# Patient Record
Sex: MALE | Race: BLACK OR AFRICAN AMERICAN | Employment: PART TIME | URBAN - METROPOLITAN AREA
[De-identification: names, ages, dates, MRNs, and addresses within clinical notes are randomized per-mention and may not be internally consistent; named-entity substitution may affect disease eponyms.]

---

## 2017-01-06 ENCOUNTER — HOSPITAL ENCOUNTER (OUTPATIENT)
Dept: LAB | Age: 47
Discharge: HOME | End: 2017-01-06
Attending: INTERNAL MEDICINE
Payer: MEDICARE

## 2017-01-06 ENCOUNTER — HOSPITAL ENCOUNTER (OUTPATIENT)
Dept: ULTRASOUND IMAGING | Age: 47
Discharge: HOME | End: 2017-01-06
Attending: INTERNAL MEDICINE
Payer: MEDICARE

## 2017-01-06 DIAGNOSIS — R18.8 ASCITES: ICD-10-CM

## 2017-01-06 PROCEDURE — 49083 ABD PARACENTESIS W/IMAGING: CPT

## 2017-01-06 NOTE — PROCEDURES
INTERVENTIONAL RADIOLOGY POST PROCEDURE NOTE              Paracentesis procedure note    January 6, 2017       9:45 AM     Preoperative Diagnosis:   Ascites. Postoperative Diagnosis:  Same. Attending Physician: Dr Laila Nolasco    : Rosina Hunter PA-C    Assistant:  None. Type of Anesthesia:  1% Lidocaine, local    Procedure:  Paracentesis    Description:   Using ultrasound guidance the largest pocket of peritoneal fluid was localized and marked at the Left lower quadrant. The patient was prepped and draped in the usual fashion. 1% Lidocaine was infiltrated locally. A 5 Czech over the needle catheter was advanced into the peritoneal cavity and clear colored fluid was aspirated. Once fluid was easily aspirated the needle was removed leaving the catheter in place. The catheter was connected to vacuum containers and 6 liters of ascitic fluid was removed. Findings:   6  Liters of ascites removed. Estimated blood Loss:  Minimal     Transfusions: None. Implants: None. Specimen Removed:   No    Drains: None.     Complications: None    Condition: Stable    Disposition: Return [address] [address], PA-C

## 2017-01-18 DIAGNOSIS — I10 HYPERTENSION, UNCONTROLLED: Chronic | ICD-10-CM

## 2017-01-18 NOTE — TELEPHONE ENCOUNTER
Requested Prescriptions     Pending Prescriptions Disp Refills    lisinopril (PRINIVIL, ZESTRIL) 40 mg tablet 30 Tab 1     Sig: Take 1 Tab by mouth daily.

## 2017-01-19 RX ORDER — ISOSORBIDE MONONITRATE 60 MG/1
TABLET, EXTENDED RELEASE ORAL
Qty: 90 TAB | Refills: 1 | Status: SHIPPED | OUTPATIENT
Start: 2017-01-19 | End: 2017-02-10

## 2017-01-19 RX ORDER — LISINOPRIL 40 MG/1
40 TABLET ORAL DAILY
Qty: 30 TAB | Refills: 1 | Status: SHIPPED | OUTPATIENT
Start: 2017-01-19 | End: 2017-01-19 | Stop reason: SDUPTHER

## 2017-01-19 RX ORDER — LISINOPRIL 40 MG/1
TABLET ORAL
Qty: 90 TAB | Refills: 1 | Status: SHIPPED | OUTPATIENT
Start: 2017-01-19 | End: 2017-04-19 | Stop reason: SDUPTHER

## 2017-01-27 ENCOUNTER — HOSPITAL ENCOUNTER (OUTPATIENT)
Dept: ULTRASOUND IMAGING | Age: 47
Discharge: HOME | End: 2017-01-27
Attending: INTERNAL MEDICINE
Payer: MEDICARE

## 2017-01-27 ENCOUNTER — HOSPITAL ENCOUNTER (OUTPATIENT)
Dept: LAB | Age: 47
Discharge: HOME | End: 2017-01-27
Attending: INTERNAL MEDICINE
Payer: MEDICARE

## 2017-01-27 DIAGNOSIS — R18.8 ASCITIC FLUID: ICD-10-CM

## 2017-01-27 DIAGNOSIS — R18.8 ASCITES: ICD-10-CM

## 2017-01-27 LAB
ANION GAP BLD CALC-SCNC: 18 MMOL/L (ref 3–18)
APTT PPP: 31.9 SEC (ref 23–36.4)
BUN SERPL-MCNC: 66 MG/DL (ref 7–18)
BUN/CREAT SERPL: 9 (ref 12–20)
CALCIUM SERPL-MCNC: 8.6 MG/DL (ref 8.5–10.1)
CHLORIDE SERPL-SCNC: 98 MMOL/L (ref 100–108)
CO2 SERPL-SCNC: 27 MMOL/L (ref 21–32)
CREAT SERPL-MCNC: 7.71 MG/DL (ref 0.6–1.3)
ERYTHROCYTE [DISTWIDTH] IN BLOOD BY AUTOMATED COUNT: 17.9 % (ref 11.6–14.5)
GLUCOSE SERPL-MCNC: 107 MG/DL (ref 74–99)
HCT VFR BLD AUTO: 43.3 % (ref 36–48)
HGB BLD-MCNC: 13.7 G/DL (ref 13–16)
INR PPP: 1.5 (ref 0.8–1.2)
MCH RBC QN AUTO: 28.8 PG (ref 24–34)
MCHC RBC AUTO-ENTMCNC: 31.6 G/DL (ref 31–37)
MCV RBC AUTO: 91.2 FL (ref 74–97)
PLATELET # BLD AUTO: 274 K/UL (ref 135–420)
PMV BLD AUTO: 11.3 FL (ref 9.2–11.8)
POTASSIUM SERPL-SCNC: 5.5 MMOL/L (ref 3.5–5.5)
PROTHROMBIN TIME: 17.6 SEC (ref 11.5–15.2)
RBC # BLD AUTO: 4.75 M/UL (ref 4.7–5.5)
SODIUM SERPL-SCNC: 143 MMOL/L (ref 136–145)
WBC # BLD AUTO: 11.1 K/UL (ref 4.6–13.2)

## 2017-01-27 PROCEDURE — 36415 COLL VENOUS BLD VENIPUNCTURE: CPT | Performed by: INTERNAL MEDICINE

## 2017-01-27 PROCEDURE — 85730 THROMBOPLASTIN TIME PARTIAL: CPT | Performed by: INTERNAL MEDICINE

## 2017-01-27 PROCEDURE — 49083 ABD PARACENTESIS W/IMAGING: CPT

## 2017-01-27 PROCEDURE — 80048 BASIC METABOLIC PNL TOTAL CA: CPT | Performed by: INTERNAL MEDICINE

## 2017-01-27 PROCEDURE — 85027 COMPLETE CBC AUTOMATED: CPT | Performed by: INTERNAL MEDICINE

## 2017-01-27 PROCEDURE — 85610 PROTHROMBIN TIME: CPT | Performed by: INTERNAL MEDICINE

## 2017-01-27 NOTE — PROCEDURES
INTERVENTIONAL RADIOLOGY POST PROCEDURE NOTE     January 27, 2017       2:06 PM     History: Cari Hanks is a 51 y.o. male with history of ascites. Procedure: Paracentesis    Pre-operative diagnosis: Ascites    Post-operative diagnosis: Same    Procedure details: Informed consent obtained. Patient prepared and draped in usual sterile fashion. 1% Lidocaine infiltrated locally. Sheathed needle advanced into peritoneal space under imaging guidance. Sheath advanced over stationary needle. Needle removed. Paracentesis performed. Sheath removed. Sterile dressing applied.     Access: Right lower abdominal quadrant (percutaneous)    Findings: Successful paracentesis; 6000 mL fluid removed    : Sherrie Knowles PA-C    Assistant(s): None    Attending: Dr Martir Mckeon    Medication(s): Local anesthesia    Contrast: None    Specimen: NO    Estimated blood loss: Minimal    Blood administered: None    Drain(s): None    Implant(s): None    Complication(s): None          Condition: Stable    Disposition: Continue previous    Sherrie Knowles PA-C

## 2017-02-01 RX ORDER — ISOSORBIDE MONONITRATE 60 MG/1
60 TABLET, EXTENDED RELEASE ORAL
Qty: 30 TAB | Refills: 1 | Status: CANCELLED | OUTPATIENT
Start: 2017-02-01

## 2017-02-01 NOTE — TELEPHONE ENCOUNTER
Call made to Pt using two identifiers. Pt made aware that the refill request on the Imdur  that he was asking for was done on 1/19/17 for a 90 day supply and 1 refill and that he can call his pharmacy for  and he verbalized understanding.

## 2017-02-01 NOTE — TELEPHONE ENCOUNTER
Requested Prescriptions     Pending Prescriptions Disp Refills    isosorbide mononitrate ER (IMDUR) 60 mg CR tablet 30 Tab 1     Sig: Take 1 Tab by mouth every morning.

## 2017-02-08 ENCOUNTER — HOSPITAL ENCOUNTER (OUTPATIENT)
Dept: ULTRASOUND IMAGING | Age: 47
Discharge: HOME | End: 2017-02-08
Attending: INTERNAL MEDICINE
Payer: MEDICARE

## 2017-02-08 DIAGNOSIS — R18.8 ASCITES: ICD-10-CM

## 2017-02-08 PROCEDURE — 49083 ABD PARACENTESIS W/IMAGING: CPT

## 2017-02-10 ENCOUNTER — APPOINTMENT (OUTPATIENT)
Dept: GENERAL RADIOLOGY | Age: 47
End: 2017-02-10
Attending: INTERNAL MEDICINE
Payer: MEDICARE

## 2017-02-10 ENCOUNTER — APPOINTMENT (OUTPATIENT)
Dept: GENERAL RADIOLOGY | Age: 47
End: 2017-02-10
Attending: EMERGENCY MEDICINE
Payer: MEDICARE

## 2017-02-10 ENCOUNTER — HOSPITAL ENCOUNTER (OUTPATIENT)
Age: 47
Setting detail: OBSERVATION
Discharge: HOME | End: 2017-02-11
Attending: EMERGENCY MEDICINE | Admitting: INTERNAL MEDICINE
Payer: MEDICARE

## 2017-02-10 DIAGNOSIS — E87.5 ACUTE HYPERKALEMIA: Primary | ICD-10-CM

## 2017-02-10 DIAGNOSIS — Z99.2 ESRD NEEDING DIALYSIS (HCC): ICD-10-CM

## 2017-02-10 DIAGNOSIS — N18.6 ESRD NEEDING DIALYSIS (HCC): ICD-10-CM

## 2017-02-10 LAB
ANION GAP BLD CALC-SCNC: 19 MMOL/L (ref 10–20)
ANION GAP BLD CALC-SCNC: 19 MMOL/L (ref 3–18)
ANION GAP BLD CALC-SCNC: 21 MMOL/L (ref 10–20)
ANION GAP BLD CALC-SCNC: 21 MMOL/L (ref 3–18)
ATRIAL RATE: 69 BPM
BASOPHILS # BLD AUTO: 0 K/UL (ref 0–0.06)
BASOPHILS # BLD: 0 % (ref 0–3)
BUN BLD-MCNC: 115 MG/DL (ref 7–18)
BUN BLD-MCNC: 116 MG/DL (ref 7–18)
BUN SERPL-MCNC: 117 MG/DL (ref 7–18)
BUN SERPL-MCNC: 66 MG/DL (ref 7–18)
BUN/CREAT SERPL: 11 (ref 12–20)
BUN/CREAT SERPL: 9 (ref 12–20)
CA-I BLD-MCNC: 0.87 MMOL/L (ref 1.12–1.32)
CA-I BLD-MCNC: 1.15 MMOL/L (ref 1.12–1.32)
CALCIUM SERPL-MCNC: 7.8 MG/DL (ref 8.5–10.1)
CALCIUM SERPL-MCNC: 9.1 MG/DL (ref 8.5–10.1)
CALCULATED P AXIS, ECG09: 30 DEGREES
CALCULATED R AXIS, ECG10: -28 DEGREES
CALCULATED T AXIS, ECG11: 35 DEGREES
CHLORIDE BLD-SCNC: 107 MMOL/L (ref 100–108)
CHLORIDE BLD-SCNC: 108 MMOL/L (ref 100–108)
CHLORIDE SERPL-SCNC: 101 MMOL/L (ref 100–108)
CHLORIDE SERPL-SCNC: 102 MMOL/L (ref 100–108)
CO2 BLD-SCNC: 17 MMOL/L (ref 19–24)
CO2 BLD-SCNC: 21 MMOL/L (ref 19–24)
CO2 SERPL-SCNC: 17 MMOL/L (ref 21–32)
CO2 SERPL-SCNC: 23 MMOL/L (ref 21–32)
CREAT SERPL-MCNC: 11 MG/DL (ref 0.6–1.3)
CREAT SERPL-MCNC: 7.02 MG/DL (ref 0.6–1.3)
CREAT UR-MCNC: 10.1 MG/DL (ref 0.6–1.3)
CREAT UR-MCNC: 10.6 MG/DL (ref 0.6–1.3)
DIAGNOSIS, 93000: NORMAL
DIFFERENTIAL METHOD BLD: ABNORMAL
EOSINOPHIL # BLD: 0 K/UL (ref 0–0.4)
EOSINOPHIL NFR BLD: 0 % (ref 0–5)
ERYTHROCYTE [DISTWIDTH] IN BLOOD BY AUTOMATED COUNT: 17.9 % (ref 11.6–14.5)
GLUCOSE BLD STRIP.AUTO-MCNC: 108 MG/DL (ref 74–106)
GLUCOSE BLD STRIP.AUTO-MCNC: 56 MG/DL (ref 70–110)
GLUCOSE BLD STRIP.AUTO-MCNC: 90 MG/DL (ref 70–110)
GLUCOSE BLD STRIP.AUTO-MCNC: 94 MG/DL (ref 74–106)
GLUCOSE SERPL-MCNC: 107 MG/DL (ref 74–99)
GLUCOSE SERPL-MCNC: 228 MG/DL (ref 74–99)
HCT VFR BLD AUTO: 44.4 % (ref 36–48)
HCT VFR BLD CALC: 39 % (ref 36–49)
HCT VFR BLD CALC: 46 % (ref 36–49)
HGB BLD-MCNC: 13.3 G/DL (ref 12–16)
HGB BLD-MCNC: 14.4 G/DL (ref 13–16)
HGB BLD-MCNC: 15.6 G/DL (ref 12–16)
LYMPHOCYTES # BLD AUTO: 10 % (ref 20–51)
LYMPHOCYTES # BLD: 1.1 K/UL (ref 0.8–3.5)
MAGNESIUM SERPL-MCNC: 2.4 MG/DL (ref 1.8–2.4)
MCH RBC QN AUTO: 28.3 PG (ref 24–34)
MCHC RBC AUTO-ENTMCNC: 32.4 G/DL (ref 31–37)
MCV RBC AUTO: 87.4 FL (ref 74–97)
MONOCYTES # BLD: 0.9 K/UL (ref 0–1)
MONOCYTES NFR BLD AUTO: 8 % (ref 2–9)
NEUTS SEG # BLD: 8.7 K/UL (ref 1.8–8)
NEUTS SEG NFR BLD AUTO: 82 % (ref 42–75)
P-R INTERVAL, ECG05: 192 MS
PHOSPHATE SERPL-MCNC: 15.9 MG/DL (ref 2.5–4.9)
PLATELET # BLD AUTO: 370 K/UL (ref 135–420)
PLATELET COMMENTS,PCOM: ABNORMAL
PMV BLD AUTO: 12.7 FL (ref 9.2–11.8)
POTASSIUM BLD-SCNC: 7.2 MMOL/L (ref 3.5–5.5)
POTASSIUM BLD-SCNC: 7.8 MMOL/L (ref 3.5–5.5)
POTASSIUM SERPL-SCNC: 4.5 MMOL/L (ref 3.5–5.5)
POTASSIUM SERPL-SCNC: 7.6 MMOL/L (ref 3.5–5.5)
Q-T INTERVAL, ECG07: 454 MS
QRS DURATION, ECG06: 106 MS
QTC CALCULATION (BEZET), ECG08: 486 MS
RBC # BLD AUTO: 5.08 M/UL (ref 4.7–5.5)
RBC MORPH BLD: ABNORMAL
SODIUM BLD-SCNC: 137 MMOL/L (ref 136–145)
SODIUM BLD-SCNC: 138 MMOL/L (ref 136–145)
SODIUM SERPL-SCNC: 140 MMOL/L (ref 136–145)
SODIUM SERPL-SCNC: 143 MMOL/L (ref 136–145)
URATE SERPL-MCNC: 8.9 MG/DL (ref 2.6–7.2)
VENTRICULAR RATE, ECG03: 69 BPM
WBC # BLD AUTO: 10.7 K/UL (ref 4.6–13.2)

## 2017-02-10 PROCEDURE — 80047 BASIC METABLC PNL IONIZED CA: CPT

## 2017-02-10 PROCEDURE — 71010 XR CHEST PORT: CPT

## 2017-02-10 PROCEDURE — 74011250637 HC RX REV CODE- 250/637: Performed by: INTERNAL MEDICINE

## 2017-02-10 PROCEDURE — 80048 BASIC METABOLIC PNL TOTAL CA: CPT | Performed by: EMERGENCY MEDICINE

## 2017-02-10 PROCEDURE — 99285 EMERGENCY DEPT VISIT HI MDM: CPT

## 2017-02-10 PROCEDURE — 96374 THER/PROPH/DIAG INJ IV PUSH: CPT

## 2017-02-10 PROCEDURE — 82962 GLUCOSE BLOOD TEST: CPT

## 2017-02-10 PROCEDURE — 74011000250 HC RX REV CODE- 250

## 2017-02-10 PROCEDURE — 80048 BASIC METABOLIC PNL TOTAL CA: CPT | Performed by: INTERNAL MEDICINE

## 2017-02-10 PROCEDURE — 77030013140 HC MSK NEB VYRM -A

## 2017-02-10 PROCEDURE — A9270 NON-COVERED ITEM OR SERVICE: HCPCS | Performed by: EMERGENCY MEDICINE

## 2017-02-10 PROCEDURE — 74011636637 HC RX REV CODE- 636/637: Performed by: EMERGENCY MEDICINE

## 2017-02-10 PROCEDURE — 84550 ASSAY OF BLOOD/URIC ACID: CPT | Performed by: INTERNAL MEDICINE

## 2017-02-10 PROCEDURE — 74011250636 HC RX REV CODE- 250/636

## 2017-02-10 PROCEDURE — 74011250637 HC RX REV CODE- 250/637: Performed by: EMERGENCY MEDICINE

## 2017-02-10 PROCEDURE — 83735 ASSAY OF MAGNESIUM: CPT | Performed by: INTERNAL MEDICINE

## 2017-02-10 PROCEDURE — 84100 ASSAY OF PHOSPHORUS: CPT | Performed by: INTERNAL MEDICINE

## 2017-02-10 PROCEDURE — 73620 X-RAY EXAM OF FOOT: CPT

## 2017-02-10 PROCEDURE — 96375 TX/PRO/DX INJ NEW DRUG ADDON: CPT

## 2017-02-10 PROCEDURE — 93005 ELECTROCARDIOGRAM TRACING: CPT

## 2017-02-10 PROCEDURE — 74011250636 HC RX REV CODE- 250/636: Performed by: EMERGENCY MEDICINE

## 2017-02-10 PROCEDURE — 96376 TX/PRO/DX INJ SAME DRUG ADON: CPT

## 2017-02-10 PROCEDURE — 90935 HEMODIALYSIS ONE EVALUATION: CPT

## 2017-02-10 PROCEDURE — 74011000258 HC RX REV CODE- 258: Performed by: EMERGENCY MEDICINE

## 2017-02-10 PROCEDURE — 85025 COMPLETE CBC W/AUTO DIFF WBC: CPT | Performed by: EMERGENCY MEDICINE

## 2017-02-10 PROCEDURE — 94640 AIRWAY INHALATION TREATMENT: CPT

## 2017-02-10 PROCEDURE — 99218 HC RM OBSERVATION: CPT

## 2017-02-10 PROCEDURE — 74011000250 HC RX REV CODE- 250: Performed by: EMERGENCY MEDICINE

## 2017-02-10 PROCEDURE — 74011250636 HC RX REV CODE- 250/636: Performed by: INTERNAL MEDICINE

## 2017-02-10 RX ORDER — ALBUTEROL SULFATE 0.83 MG/ML
5 SOLUTION RESPIRATORY (INHALATION)
Status: COMPLETED | OUTPATIENT
Start: 2017-02-10 | End: 2017-02-10

## 2017-02-10 RX ORDER — CLONIDINE HYDROCHLORIDE 0.1 MG/1
0.3 TABLET ORAL 3 TIMES DAILY
Status: DISCONTINUED | OUTPATIENT
Start: 2017-02-10 | End: 2017-02-11 | Stop reason: HOSPADM

## 2017-02-10 RX ORDER — MELATONIN
2000 2 TIMES DAILY
Status: DISCONTINUED | OUTPATIENT
Start: 2017-02-10 | End: 2017-02-11 | Stop reason: HOSPADM

## 2017-02-10 RX ORDER — DOXERCALCIFEROL 4 UG/2ML
5 INJECTION INTRAVENOUS
Status: DISCONTINUED | OUTPATIENT
Start: 2017-02-10 | End: 2017-02-11 | Stop reason: HOSPADM

## 2017-02-10 RX ORDER — HYDRALAZINE HYDROCHLORIDE 50 MG/1
100 TABLET, FILM COATED ORAL EVERY 8 HOURS
Status: DISCONTINUED | OUTPATIENT
Start: 2017-02-10 | End: 2017-02-11 | Stop reason: HOSPADM

## 2017-02-10 RX ORDER — DEXTROSE 50 % IN WATER (D50W) INTRAVENOUS SYRINGE
Status: COMPLETED
Start: 2017-02-10 | End: 2017-02-10

## 2017-02-10 RX ORDER — ISOSORBIDE MONONITRATE 30 MG/1
60 TABLET, EXTENDED RELEASE ORAL
Status: DISCONTINUED | OUTPATIENT
Start: 2017-02-11 | End: 2017-02-11 | Stop reason: HOSPADM

## 2017-02-10 RX ORDER — LEVETIRACETAM 500 MG/1
500 TABLET ORAL DAILY
Status: DISCONTINUED | OUTPATIENT
Start: 2017-02-11 | End: 2017-02-11 | Stop reason: HOSPADM

## 2017-02-10 RX ORDER — ACETAMINOPHEN 500 MG
500 TABLET ORAL
Status: DISCONTINUED | OUTPATIENT
Start: 2017-02-10 | End: 2017-02-11 | Stop reason: HOSPADM

## 2017-02-10 RX ORDER — METOPROLOL TARTRATE 50 MG/1
200 TABLET ORAL EVERY 12 HOURS
Status: DISCONTINUED | OUTPATIENT
Start: 2017-02-10 | End: 2017-02-11 | Stop reason: HOSPADM

## 2017-02-10 RX ORDER — ASPIRIN 81 MG/1
81 TABLET ORAL DAILY
Status: DISCONTINUED | OUTPATIENT
Start: 2017-02-11 | End: 2017-02-11 | Stop reason: HOSPADM

## 2017-02-10 RX ORDER — CLONIDINE HYDROCHLORIDE 0.1 MG/1
0.3 TABLET ORAL
Status: COMPLETED | OUTPATIENT
Start: 2017-02-10 | End: 2017-02-10

## 2017-02-10 RX ORDER — SODIUM CHLORIDE 9 MG/ML
100 INJECTION, SOLUTION INTRAVENOUS
Status: DISCONTINUED | OUTPATIENT
Start: 2017-02-10 | End: 2017-02-11 | Stop reason: HOSPADM

## 2017-02-10 RX ORDER — LISINOPRIL 20 MG/1
40 TABLET ORAL DAILY
Status: DISCONTINUED | OUTPATIENT
Start: 2017-02-11 | End: 2017-02-11 | Stop reason: HOSPADM

## 2017-02-10 RX ORDER — PANTOPRAZOLE SODIUM 40 MG/1
40 TABLET, DELAYED RELEASE ORAL
Status: DISCONTINUED | OUTPATIENT
Start: 2017-02-11 | End: 2017-02-11 | Stop reason: HOSPADM

## 2017-02-10 RX ORDER — SODIUM BICARBONATE 1 MEQ/ML
50 SYRINGE (ML) INTRAVENOUS
Status: COMPLETED | OUTPATIENT
Start: 2017-02-10 | End: 2017-02-10

## 2017-02-10 RX ORDER — HEPARIN SODIUM 1000 [USP'U]/ML
1000 INJECTION, SOLUTION INTRAVENOUS; SUBCUTANEOUS
Status: DISCONTINUED | OUTPATIENT
Start: 2017-02-10 | End: 2017-02-11 | Stop reason: HOSPADM

## 2017-02-10 RX ORDER — AMOXICILLIN 250 MG
1 CAPSULE ORAL DAILY
Status: DISCONTINUED | OUTPATIENT
Start: 2017-02-11 | End: 2017-02-11 | Stop reason: HOSPADM

## 2017-02-10 RX ORDER — DEXTROSE 50 % IN WATER (D50W) INTRAVENOUS SYRINGE
25
Status: COMPLETED | OUTPATIENT
Start: 2017-02-10 | End: 2017-02-10

## 2017-02-10 RX ORDER — CALCIUM ACETATE 667 MG/1
1 CAPSULE ORAL
Status: DISCONTINUED | OUTPATIENT
Start: 2017-02-10 | End: 2017-02-11

## 2017-02-10 RX ORDER — LEVETIRACETAM 250 MG/1
250 TABLET ORAL
Status: DISCONTINUED | OUTPATIENT
Start: 2017-02-10 | End: 2017-02-11 | Stop reason: HOSPADM

## 2017-02-10 RX ORDER — OXYCODONE AND ACETAMINOPHEN 5; 325 MG/1; MG/1
1 TABLET ORAL
Status: DISCONTINUED | OUTPATIENT
Start: 2017-02-10 | End: 2017-02-11 | Stop reason: HOSPADM

## 2017-02-10 RX ADMIN — HYDRALAZINE HYDROCHLORIDE 100 MG: 50 TABLET, FILM COATED ORAL at 23:15

## 2017-02-10 RX ADMIN — CLONIDINE HYDROCHLORIDE 0.3 MG: 0.1 TABLET ORAL at 23:15

## 2017-02-10 RX ADMIN — CALCIUM ACETATE 667 MG: 667 CAPSULE ORAL at 16:23

## 2017-02-10 RX ADMIN — LEVETIRACETAM 250 MG: 250 TABLET, FILM COATED ORAL at 16:23

## 2017-02-10 RX ADMIN — VITAMIN D, TAB 1000IU (100/BT) 2000 UNITS: 25 TAB at 23:16

## 2017-02-10 RX ADMIN — DEXTROSE MONOHYDRATE 25 G: 25 INJECTION, SOLUTION INTRAVENOUS at 19:04

## 2017-02-10 RX ADMIN — OXYCODONE HYDROCHLORIDE AND ACETAMINOPHEN 1 TABLET: 5; 325 TABLET ORAL at 16:23

## 2017-02-10 RX ADMIN — HYDRALAZINE HYDROCHLORIDE 100 MG: 50 TABLET, FILM COATED ORAL at 16:23

## 2017-02-10 RX ADMIN — SODIUM BICARBONATE 50 MEQ: 84 INJECTION INTRAVENOUS at 14:35

## 2017-02-10 RX ADMIN — INSULIN HUMAN 10 UNITS: 100 INJECTION, SOLUTION PARENTERAL at 14:35

## 2017-02-10 RX ADMIN — DEXTROSE MONOHYDRATE 25 G: 25 INJECTION, SOLUTION INTRAVENOUS at 14:36

## 2017-02-10 RX ADMIN — ALBUTEROL SULFATE 5 MG: 2.5 SOLUTION RESPIRATORY (INHALATION) at 14:41

## 2017-02-10 RX ADMIN — CLONIDINE HYDROCHLORIDE 0.3 MG: 0.1 TABLET ORAL at 14:35

## 2017-02-10 RX ADMIN — DOXERCALCIFEROL 5 MCG: 2 INJECTION, SOLUTION INTRAVENOUS at 19:22

## 2017-02-10 RX ADMIN — OXYCODONE HYDROCHLORIDE AND ACETAMINOPHEN 1 TABLET: 5; 325 TABLET ORAL at 23:35

## 2017-02-10 RX ADMIN — CALCIUM GLUCONATE 1 G: 94 INJECTION, SOLUTION INTRAVENOUS at 15:03

## 2017-02-10 NOTE — CONSULTS
Urology Consult Note    Patient: Magalis Gutiérrez               Sex: male          DOA: 2/10/2017         YOB: 1970      Age:  51 y.o.        LOS:  LOS: 0 days              Referring physician: Keely Lee  Reason for consult: Penile lesion      Assessment   This is a 51 y.o. 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 Excisional biopsy as an outpatient. I will arrange. HPI:     Magalis Gutiérrez is a 51 y.o. male who has been is currently admitted with  Hyperkalemia. H/o ESRD (13 yrs). Currently on HD via catheter. Previously PD. ESRD 2/2 HTN. Presented after syncopal episode after missing episode of dialysis. 3 month history of ulcer on the penis. Stable. No STIs. Tender. No fevers, chills. Nothing makes it better. Has nodular lesions and ulcerated lesion on joints of his hand and toes. Told it was gout.      Past Medical History   Diagnosis Date    Anemia in chronic kidney disease(285.21) 4/24/2013    Cardiomyopathy due to hypertension ([address] 4/24/2013     EF 20%    Edema of lower extremity 4/24/2013    End stage renal disease ([address] 04/19/2013     HD since 9/2003    Hyperphosphatemia 4/24/2013    Hypertension     Hypertension, uncontrolled 4/24/2013    Personal history of atrial flutter 4/24/2013    Pulmonary hypertension ([address] 4/24/2013    Recommendation refused by patient 4/24/2013     Patient refuses dialysis (hemodialysis or peritoneal dialysis)    Secondary hyperparathyroidism of renal origin ([address] 4/24/2013    Stroke ([address]      x2 1/2015    Vitamin D insufficiency 4/25/2013     Vitamin D 25-Hydroxy 20.7        Past Surgical History   Procedure Laterality Date    Hx heart catheterization  01/2015    Hx csf shunt  09/2016       Family History   Problem Relation Age of Onset    Hypertension Mother     Hypertension Father     Heart Attack Neg Hx     Stroke Neg Hx        Social History Social History    Marital status: SINGLE     Spouse name: N/A    Number of children: N/A    Years of education: N/A     Social History Main Topics    Smoking status: Never Smoker    Smokeless tobacco: Never Used    Alcohol use No    Drug use: No    Sexual activity: No     Other Topics Concern    Not on file     Social History Narrative       Prior to Admission medications    Medication Sig Start Date End Date Taking? Authorizing Provider   b complex-vitamin c-folic acid (NEPHROCAPS) 1 mg capsule Take 1 Cap by mouth daily. [Request refills from PCP (Dr. Diane Priest)] 9/22/16  Yes Nisha Jacinto MD   cloNIDine HCl (CATAPRES) 0.3 mg tablet Take 1 Tab by mouth three (3) times daily. 9/22/16  Yes Nisha Jacinto MD   metoprolol tartrate (LOPRESSOR) 100 mg IR tablet Take 2 Tabs by mouth every twelve (12) hours. 9/22/16  Yes Nisha Jacinto MD   aspirin delayed-release 81 mg tablet Take 1 tablet by mouth daily. 1/22/15  Yes Emily Monge MD   lisinopril (PRINIVIL, ZESTRIL) 40 mg tablet TAKE 1 TABLET BY MOUTH DAILY 1/19/17   Romeo Belle MD   levETIRAcetam (KEPPRA) 250 mg tablet TAKE 1 TABLET BY MOUTH EVERY Monday, Wednesday, Friday AFTER EACH DIALYSIS 12/8/16   Fran Hector MD   levETIRAcetam (KEPPRA) 500 mg tablet TAKE 1 TABLET BY MOUTH DAILY 12/8/16   Fran Hector MD   acetaminophen (TYLENOL) 500 mg tablet Take  by mouth every six (6) hours as needed for Pain. Historical Provider   simethicone (PHAZYME) 180 mg cap Take  by mouth. Historical Provider   hydrALAZINE (APRESOLINE) 100 mg tablet TAKE 1 TABLET BY MOUTH EVERY 8 HOURS 11/11/16   Frandy Poon MD   calcium acetate (PHOSLO) 667 mg cap Take 1 Cap by mouth three (3) times daily (with meals).  9/22/16   Nisha Jacinto MD   isosorbide mononitrate ER (IMDUR) 60 mg CR tablet Take 1 Tab by mouth every morning. 9/22/16   Nisha Jacinto MD   oxyCODONE-acetaminophen (PERCOCET) 5-325 mg per tablet Take 1 Tab by mouth every eight (8) hours as needed. Max Daily Amount: 3 Tabs. 9/22/16   Richelle Clarke MD   senna-docusate (PERICOLACE) 8.6-50 mg per tablet Take 1 Tab by mouth daily. 3/1/16   Chiquita Nicolas MD   pantoprazole (PROTONIX) 40 mg tablet Take 1 Tab by mouth Daily (before breakfast). [Request refills from PCP (Dr. Marlyn Priest)] 5/8/13   Juliette Valdez MD   cholecalciferol (VITAMIN D3) 1,000 unit tablet Take 2 Tabs by mouth two (2) times a day. [Request refills from PCP (Dr. Marlyn Priest)] 5/8/13   Juliette Valdez MD       Allergies   Allergen Reactions    Amlodipine Swelling    Nuts Patricia  Nut] Swelling    Pcn [Penicillins] Unknown (comments)    Phenothiazines Other (comments)     Per father, \"He has a parkinsonian reaction to this medication. \"       Review of Systems  Pertinent items are noted in the History of Present Illness. Physical Exam:      Visit Vitals    BP (!) 161/112    Pulse 63    Temp 98.7 °F (37.1 °C)    Resp 20    SpO2 100%       Physical Exam:   GENERAL: alert, cooperative, no distress, appears stated age  LUNG: unlabored breathing  ABDOMEN: soft, non-tender. Distended. Bowel sounds normal. No masses,  no organomegaly  EXTREMITIES:  Multiple nodules on joints of hands. One on left hand is ulcerated. SKIN: no jaundice  : no CVA tenderness. Circumcised phallus. Ulcerated 2 cm firm lesion at coronol sulcus at 3 oclock position. Freely mobile. No inguinal adenopathy.        Lab/Data Review:  BMP: Lab Results   Component Value Date/Time     02/10/2017 02:07 PM    K 7.6 (HH) 02/10/2017 02:07 PM     02/10/2017 02:07 PM    CO2 17 (L) 02/10/2017 02:07 PM    AGAP 21 (H) 02/10/2017 02:07 PM     (H) 02/10/2017 02:07 PM     (H) 02/10/2017 02:07 PM    CREA 11.00 (H) 02/10/2017 02:07 PM    GFRAA 6 (L) 02/10/2017 02:07 PM    GFRNA 5 (L) 02/10/2017 02:07 PM     CBC: Lab Results   Component Value Date/Time    WBC 10.7 02/10/2017 02:07 PM    HGB 14.4 02/10/2017 02:07 PM    HCT 44.4 02/10/2017 02:07 PM     02/10/2017 02:07 PM     COAGS: No results found for: APTT, PTP, INR       CT Results:    Results from Hospital Encounter encounter on 12/05/16   CT HEAD WO CONT   Narrative CT scan of brain, without intravenous contrast enhancement:    INDICATION:    Seizure. History of end-stage renal disease, cardiomyopathy, hypertension, and anemia. TECHNIQUE:    Contiguous 5 mm thick axial sections of brain are obtained without intravenous  contrast. Coronal and sagittal images are reformatted. All CT scans at this facility are performed using dose optimization technique as  appropriate to a  performed  examination, to include automated exposer control,  adjustment mA and / or  KV according to patient size (including appropriate  matching  for site specific examination), or use  of iterative  reconstruction  technique. COMPARISON STUDY: CT scan of brain dated 04/19/2013. FINDINGS:        No evidence of intracranial hemorrhage. There are findings of mild to moderate cortical atrophy changes in the frontal  lobes and upper parietal lobes bilaterally. Cerebral ventricles are of normal  size, without midline shift. In periventricular and central white matter there  are findings of moderate chronic microvascular ischemic changes, which are more  pronounced as compared to previous study. There is no definable focal hypodensity or focal infarction in cerebral cortex  in either side. There is no definable focal abnormality in basal area structures of both sides,  or in cerebellum. No definable intracranial mass lesion or mass effect. No diagnostic finding in calvarium, base of skull, visualized upper portions of  both orbits or in visualized upper portions of paranasal sinuses. Impression IMPRESSION:    No evidence of intracranial hemorrhages or any other definable acute  intracranial process.           US Results:    Results from [place] encounter on 02/08/17   US GUIDE PARACENTESIS   Narrative Ultrasound-Guided Paracentesis     INDICATION: Ascites. OPERATORS: Dr. Yoselyn Chandler M.D. PROCEDURE: Ultrasound guided paracentesis. SITE: Right lower quadrant. COMPLICATIONS: None. EBL: Minimal.    TECHNIQUE:    Risks, benefits, and alternatives were discussed. Written  and verbal consent  was obtained. Limited ultrasound scanning was performed for localization of the  largest fluid collection. The right lower quadrant was then prepped and draped  in usual sterile fashion. Maximum sterile barrier technique was used. 1%  lidocaine was used to anesthetize the subcutaneous tissues down to the  peritoneum. A 19-gauge Yueh needle was then advanced into the fluid collection  and 5800 mL of serous fluid was obtained  and sent for the requested labs. Patient tolerated the procedure well without immediate complication. Impression IMPRESSION:    1. Status post successful ultrasound-guided paracentesis with removal of 5800mL.         Jerry Roa MD    Pager: [phone]  Office: [phone]

## 2017-02-10 NOTE — ED PROVIDER NOTES
HPI Comments: Patient with a history of end-stage renal disease on hemodialysis Monday Wednesday Friday, last Dialysis on Monday reportedly had a full run, missed Wednesday because he was getting paracentesis presents today with shortness breath, reports has been going on for a few months. He was on his way to dialysis when he felt his legs give out. Denies any pain at this time. Denies any fever or chills. Nephro: Emely Sullivan         Past Medical History:   Diagnosis Date    Anemia in chronic kidney disease(285.21) 4/24/2013    Cardiomyopathy due to hypertension ([address] 4/24/2013     EF 20%    Edema of lower extremity 4/24/2013    End stage renal disease ([address] 04/19/2013     HD since 9/2003    Hyperphosphatemia 4/24/2013    Hypertension     Hypertension, uncontrolled 4/24/2013    Personal history of atrial flutter 4/24/2013    Pulmonary hypertension ([address] 4/24/2013    Recommendation refused by patient 4/24/2013     Patient refuses dialysis (hemodialysis or peritoneal dialysis)    Secondary hyperparathyroidism of renal origin ([address] 4/24/2013    St[facility]      x2 1/2015    Vitamin D insufficiency 4/25/2013     Vitamin D 25-Hydroxy 20.7        Past Surgical History:   Procedure Laterality Date    Hx heart catheterization  01/2015    Hx csf shunt  09/2016         Family History:   Problem Relation Age of Onset    Hypertension Mother     Hypertension Father     Heart Attack Neg Hx     Stroke Neg Hx        Social History     Social History    Marital status: SINGLE     Spouse name: N/A    Number of children: N/A    Years of education: N/A     Occupational History    Not on file. Social History Main Topics    Smoking status: Never Smoker    Smokeless tobacco: Never Used    Alcohol use No    Drug use: No    Sexual activity: No     Other Topics Concern    Not on file     Social History Narrative         ALLERGIES: Amlodipine; Nuts [tree nut];  Pcn [penicillins]; and Phenothiazines    Review of Systems   Constitutional: Negative for fever. HENT: Negative for nosebleeds. Eyes: Negative for visual disturbance. Respiratory: Positive for shortness of breath. Cardiovascular: Negative for chest pain. Gastrointestinal: Negative for abdominal pain. Genitourinary: Negative for dysuria. Musculoskeletal: Negative for myalgias. Skin: Negative for rash. Neurological: Positive for weakness. All other systems reviewed and are negative. Vitals:    02/10/17 1255 02/10/17 1300   BP: (!) 238/174 (!) 216/163            Physical Exam   Constitutional:   General:  Well-developed, well-nourished, no apparent distress. Head:  Normocephalic atraumatic. Eyes:  Pupils midrange extraocular movements intact. No pallor or conjunctival injection. Nose:  No rhinorrhea, inspection grossly normal.    Ears:  Grossly normal to inspection, no discharge. Mouth:  Mucous membranes moist, no appreciable intraoral lesion. Neck:  Trachea midline, no asymmetry. Positive JVD  Chest:  Grossly normal inspection, symmetric chest rise. Pulmonary:  Clear to auscultation bilaterally no wheezes rhonchi or rales. Diminished bibasilar  Cardiovascular:  S1-S2 no murmurs rubs or gallops. Abdomen: Distended, fluid wave, no tenderness on palpation, RIGHT lower paracentesis site dressing clean dry and intact. Extremities:  Grossly normal to inspection, peripheral pulses intact. 1+ symmetric pitting edema bilateral  Neurologic:  Alert and oriented no appreciable focal neurologic deficit. Psychiatric:  Grossly normal mood and affect. Nursing note reviewed, vital signs reviewed. MDM  Number of Diagnoses or Management Options  Diagnosis management comments: ED course:  Patient presents with shortness breath, missed dialysis, will dilate for hyperkalemia. Will require dialysis here. EKG done at 1310 hrs.: Normal sinus rhythm heart rate 69.   There are signs of hyperkalemia with T waves V2 through V5, widened QRS duration 106. Prior EKG done on December 2016 has a QRS duration of 84 and no peaked T waves. 1400 Consult:  Discussed care with Dr. Claritza Barahona. Standard discussion; including history of patients chief complaint, available diagnostic results, and treatment course. Plan for dialysis here      POC chem 8 significant for a potassium of 7.8. Given his EKG changes we'll treat for hyperkalemia with calcium gluconate, albuterol/insulin/dextra/bicarbonate      Patient's presentation, history, physical exam and laboratory evaluations were reviewed. I felt the patient would benefit from inpatient management and treatment. Consult:  Discussed care with Dr. Andrew Payne. Standard discussion; including history of patients chief complaint, available diagnostic results, and treatment course. Patient was accepted to their service. Disposition:    Admitted to medicine service      Portions of this chart were created with Dragon medical speech to text program.   Unrecognized errors may be present.     ED Course       Procedures

## 2017-02-10 NOTE — H&P
History and Physical        Patient: Jonathon Sherman               Sex: male          DOA: 2/10/2017         YOB: 1970      Age:  51 y.o.        LOS:  LOS: 0 days        HPI:     Jonathon Sherman is a 51 y.o. male who has a history of end-stage renal disease on hemodialysis Monday, Wednesday, Friday, last Dialysis on Monday reportedly had a full run, missed Wednesday because he was getting paracentesis presents today with shortness breath, reports has been going on for a few months. He was on his way to dialysis when he felt his legs give out. Denies any pain at this time. Denies any fever or chills. States he only took his clonidine this morning. Also has a penile lesion that has been present for 3 months and a lesion on hand and foot that he has been told are tophi in the past.  No other complaints or concerns.     Past Medical History   Diagnosis Date    Anemia in chronic kidney disease(285.21) 4/24/2013    Cardiomyopathy due to hypertension ([address] 4/24/2013     EF 20%    Edema of lower extremity 4/24/2013    End stage renal disease ([address] 04/19/2013     HD since 9/2003    Hyperphosphatemia 4/24/2013    Hypertension     Hypertension, uncontrolled 4/24/2013    Personal history of atrial flutter 4/24/2013    Pulmonary hypertension ([address] 4/24/2013    Recommendation refused by patient 4/24/2013     Patient refuses dialysis (hemodialysis or peritoneal dialysis)    Secondary hyperparathyroidism of renal origin ([address] 4/24/2013    Stroke [address] [address]      x2 1/2015    Vitamin D insufficiency 4/25/2013     Vitamin D 25-Hydroxy 20.7        Past Surgical History   Procedure Laterality Date    Hx heart catheterization  01/2015    Hx csf shunt  09/2016       Family History   Problem Relation Age of Onset    Hypertension Mother     Hypertension Father     Heart Attack Neg Hx     Stroke Neg Hx        Social History     Social History    Marital status: SINGLE     Spouse name: N/A    Number of children: N/A    Years of education: N/A     Social History Main Topics    Smoking status: Never Smoker    Smokeless tobacco: Never Used    Alcohol use No    Drug use: No    Sexual activity: No     Other Topics Concern    Not on file     Social History Narrative       Prior to Admission medications    Medication Sig Start Date End Date Taking? Authorizing Provider   b complex-vitamin c-folic acid (NEPHROCAPS) 1 mg capsule Take 1 Cap by mouth daily. [Request refills from PCP (Dr. Mary Jo Priest)] 9/22/16  Yes Noy Uribe MD   cloNIDine HCl (CATAPRES) 0.3 mg tablet Take 1 Tab by mouth three (3) times daily. 9/22/16  Yes Noy Uribe MD   metoprolol tartrate (LOPRESSOR) 100 mg IR tablet Take 2 Tabs by mouth every twelve (12) hours. 9/22/16  Yes Noy Uribe MD   aspirin delayed-release 81 mg tablet Take 1 tablet by mouth daily. 1/22/15  Yes Ruth Huang MD   lisinopril (PRINIVIL, ZESTRIL) 40 mg tablet TAKE 1 TABLET BY MOUTH DAILY 1/19/17   Madison Islas MD   levETIRAcetam (KEPPRA) 250 mg tablet TAKE 1 TABLET BY MOUTH EVERY Monday, Wednesday, Friday AFTER EACH DIALYSIS 12/8/16   Lala Martinez MD   levETIRAcetam (KEPPRA) 500 mg tablet TAKE 1 TABLET BY MOUTH DAILY 12/8/16   Lala Martinez MD   acetaminophen (TYLENOL) 500 mg tablet Take  by mouth every six (6) hours as needed for Pain. Historical Provider   simethicone (PHAZYME) 180 mg cap Take  by mouth. Historical Provider   hydrALAZINE (APRESOLINE) 100 mg tablet TAKE 1 TABLET BY MOUTH EVERY 8 HOURS 11/11/16   Adelaida Harper MD   calcium acetate (PHOSLO) 667 mg cap Take 1 Cap by mouth three (3) times daily (with meals). 9/22/16   Noy Uribe MD   isosorbide mononitrate ER (IMDUR) 60 mg CR tablet Take 1 Tab by mouth every morning. 9/22/16   Noy Uribe MD   oxyCODONE-acetaminophen (PERCOCET) 5-325 mg per tablet Take 1 Tab by mouth every eight (8) hours as needed. Max Daily Amount: 3 Tabs.  9/22/16   Noy Uribe MD   senna-docusate (PERICOLACE) 8.6-50 mg per tablet Take 1 Tab by mouth daily. 3/1/16   Bossman Sutherland MD   pantoprazole (PROTONIX) 40 mg tablet Take 1 Tab by mouth Daily (before breakfast). [Request refills from PCP (Dr. No Priest)] 5/8/13   Kirt Mojica MD   cholecalciferol (VITAMIN D3) 1,000 unit tablet Take 2 Tabs by mouth two (2) times a day. [Request refills from PCP (Dr. No Priest)] 5/8/13   Kirt Mojica MD       Allergies   Allergen Reactions    Amlodipine Swelling    Nuts Camryn Ng Nut] Swelling    Pcn [Penicillins] Unknown (comments)    Phenothiazines Other (comments)     Per father, \"He has a parkinsonian reaction to this medication. \"       Review of Systems  CONSTITUTIONAL: No Fever, No chills, No weight loss, No Night sweats  HEENT: No nasal discharge, No PN drainage, No epistaxis, No diff in swallowing  CVS: No chest pain, No palpitations, No syncope, No peripheral edema, No PND, No orthopnea  RS:  No cough, No hemoptysis, No pleuritic chest pain  GI: No abd pain, No vomitting, No diarrhea, No hematemesis, No rectal bleeding, No acid reflux or heartburn  NEURO: No focal weakness, No headaches, No seizures  PSYCH: No anxiety, No depression  MUSCULOSKLETAL: No joint pain or swelling  : No hematuria or dysuria  SKIN: No rash        Physical Exam:      Visit Vitals    BP (!) 216/163     No intake or output data in the 24 hours ending 02/10/17 1556    Physical Exam:  General: comfortable  Neck: No adenopathy or thyroid swelling  CVS: No significant JVD (Jugular Venous Distention) observed, PMI (Point of Maximum Impulse) not displaced, regular rate and rhythm, S1S2 normal,  no murmurs  RS: Symmetrical chest rise, clear to auscultation bilaterally  Abd: No distention observed, bowel sounds normal, palpation is soft, non  tender, no hepatosplenomegaly, no distension or guarding or rigidity  Neuro: non focal, awake, alert  Extrm: no leg edema   Skin: no rash, punctate lesion on L index finger of DIP and on Right Great Toe over PIP. Ulcerative lesion on dorsum of penis. Labs Reviewed:    Chemistry Recent Labs      02/10/17   1407   GLU  107*   NA  140   K  7.6*   CL  102   CO2  17*   BUN  117*   CREA  11.00*   CA  7.8*   AGAP  21*   BUCR  11*        Lactic Acid Lactic acid   Date Value Ref Range Status   06/27/2015 1.6 0.4 - 2.0 MMOL/L Final     No results for input(s): LAC in the last 72 hours. Liver Enzymes Protein, total   Date Value Ref Range Status   12/23/2016 7.3 6.4 - 8.2 g/dL Final     Albumin   Date Value Ref Range Status   12/23/2016 3.2 (L) 3.4 - 5.0 g/dL Final     Globulin   Date Value Ref Range Status   12/23/2016 4.1 (H) 2.0 - 4.0 g/dL Final     A-G Ratio   Date Value Ref Range Status   12/23/2016 0.8 0.8 - 1.7   Final     AST (SGOT)   Date Value Ref Range Status   12/23/2016 17 15 - 37 U/L Final     Alk. phosphatase   Date Value Ref Range Status   12/23/2016 106 45 - 117 U/L Final     No results for input(s): TP, ALB, GLOB, AGRAT, SGOT, GPT, AP, TBIL in the last 72 hours. No lab exists for component: DBIL     CBC w/Diff Recent Labs      02/10/17   1407   WBC  10.7   RBC  5.08   HGB  14.4   HCT  44.4   PLT  370   GRANS  82*   LYMPH  10*   EOS  0        Cardiac Enzymes No results found for: CPK, CKMMB, CKMB, RCK3, CKMBT, CKNDX, CKND1, SANDRA, TROPT, TROIQ, ISIDORO, TROPT, TNIPOC, BNP, BNPP     BNP No results found for: BNP, BNPP, XBNPT     Coagulation No results for input(s): PTP, INR, APTT in the last 72 hours. No lab exists for component: INREXT      Thyroid  Lab Results   Component Value Date/Time    TSH 1.71 12/05/2016 04:48 PM            ABG No results for input(s): PHI, PHI, PCO2I, PO2, PO2I, HCO3, HCO3I, FIO2, FIO2I in the last 72 hours.     No lab exists for component: POC2     Urinalysis Lab Results   Component Value Date/Time    Color YELLOW 01/18/2015 11:15 PM    Appearance CLEAR 01/18/2015 11:15 PM    Specific gravity 1.025 01/18/2015 11:15 PM    pH (UA) 5.5 01/18/2015 11:15 PM Protein 30 01/18/2015 11:15 PM    Glucose NEGATIVE  01/18/2015 11:15 PM    Ketone NEGATIVE  01/18/2015 11:15 PM    Bilirubin NEGATIVE  01/18/2015 11:15 PM    Urobilinogen 0.2 01/18/2015 11:15 PM    Nitrites NEGATIVE  01/18/2015 11:15 PM    Leukocyte Esterase NEGATIVE  01/18/2015 11:15 PM    Epithelial cells FEW 01/18/2015 11:15 PM    Bacteria NEGATIVE  01/18/2015 11:15 PM    WBC 4 to 10 01/18/2015 11:15 PM    RBC 0 to 3 01/18/2015 11:15 PM        Micro  No results for input(s): SDES, CULT in the last 72 hours. No results for input(s): CULT in the last 72 hours. XR (Most Recent). CXR reviewed by me and compared with previous CXR   Results from Hospital Encounter encounter on 02/10/17   XR CHEST PORT   Narrative CHEST PORTABLE    CPT CODE: 72968    COMPARISON: December 23, 2016    INDICATIONS: Bilateral lower extremity swelling. Shortness of breath. Recent  collapse. FINDINGS: There is a left-sided dialysis catheter with its tip in the right  atrium. The heart is enlarged. Lung volumes are low. There is increased density  in the lung bases bilaterally left side greater than right likely related to  atelectasis and pleural fluid. There is an infiltrative process in the left  midlung field. Mild pulmonary vascular congestion may be present. Impression Impression:    Dialysis catheter in good position. Cardiomegaly. Increased density lung bases  left side greater than right likely related atelectasis and pleural fluid. There  is a more focal infiltrate in the left midlung field which could represent  pneumonia or atypical pulmonary edema. There is evidence of mild pulmonary  vascular congestion. CT (Most Recent)   Results from Hospital Encounter encounter on 12/05/16   CT HEAD WO CONT   Narrative CT scan of brain, without intravenous contrast enhancement:    INDICATION:    Seizure. History of end-stage renal disease, cardiomyopathy, hypertension, and anemia.         TECHNIQUE:    Contiguous 5 mm thick axial sections of brain are obtained without intravenous  contrast. Coronal and sagittal images are reformatted. All CT scans at this facility are performed using dose optimization technique as  appropriate to a  performed  examination, to include automated exposer control,  adjustment mA and / or  KV according to patient size (including appropriate  matching  for site specific examination), or use  of iterative  reconstruction  technique. COMPARISON STUDY: CT scan of brain dated 04/19/2013. FINDINGS:        No evidence of intracranial hemorrhage. There are findings of mild to moderate cortical atrophy changes in the frontal  lobes and upper parietal lobes bilaterally. Cerebral ventricles are of normal  size, without midline shift. In periventricular and central white matter there  are findings of moderate chronic microvascular ischemic changes, which are more  pronounced as compared to previous study. There is no definable focal hypodensity or focal infarction in cerebral cortex  in either side. There is no definable focal abnormality in basal area structures of both sides,  or in cerebellum. No definable intracranial mass lesion or mass effect. No diagnostic finding in calvarium, base of skull, visualized upper portions of  both orbits or in visualized upper portions of paranasal sinuses. Impression IMPRESSION:    No evidence of intracranial hemorrhages or any other definable acute  intracranial process. EKG No results found for this or any previous visit. ECHO No results found for this or any previous visit. PFT No flowsheet data found. Assessment/Plan     1)  Hyperkalemia: treated by ED, repeat K lab ordered. Dr. Jose Sanders, nephrology, will have patient receive HD in ED. Continue home medications and renal diet. Admit to stepdown on 23hour obs. Continue telemetry. 2) ESRD on HD: as above.     3) noncompliance with medications, HTN, elevated BP: patient encouraged to take medications as prescribed. Home BP meds restarted. 4) Penile lesion: discussed with Dr. Liane Rojas, urology who will either see patient today or have him follow up in his clinic early next week. 5) ?tophi lesions: appear to be tophi to me. Will check xray of foot as patient states that has never been x-rayed. Uric acid ordered. Treatment to be determined by nephrology    6) Code status:  Full code per patient. 7) DVT prophylaxis: SCD's as patient is 23 hour obs only and ambulatory.

## 2017-02-10 NOTE — ED NOTES
Patient to ER by EMS with c/o BLE swelling and collapse. Patient is a MWF dialysis patient, due for dialysis today, but was brought to ER d/t collapse. Patient VSS. C/o SOB, placed on 4L NC which improved SOB.

## 2017-02-10 NOTE — CONSULTS
Consult Note  Consult requested by: Dr Chanel Apodaca is a 51 y.o. male [address] who is being seen on consult for ESRD/ Hyperkalemia/HTN uncontrolled  No chief complaint on file. Admission diagnosis: <principal problem not specified>     HPI: 52 yo AA male admitted for weakness SOB and missing HD. Initial evaluation showing EKG changes suggestive of Hyperkalemia and serum K at 7.6. Patient currently denies any CP. Breathing ok. Father at bedside. Claims he had HD on Monday with some problems using his HD cath (TPA done). AVF not working well. Had paracentesis done Wed (6 liters removed) so he missed HD. Denies missing his meds but BP quite high,  Denies any cp, fever,sob. Had some diarrhea yesterday, no vomiting. No SZ  Past Medical History   Diagnosis Date    Anemia in chronic kidney disease(285.21) 4/24/2013    Cardiomyopathy due to hypertension ([address] 4/24/2013     EF 20%    Edema of lower extremity 4/24/2013    End stage renal disease ([address] 04/19/2013     HD since 9/2003    Hyperphosphatemia 4/24/2013    Hypertension     Hypertension, uncontrolled 4/24/2013    Personal history of atrial flutter 4/24/2013    Pulmonary hypertension ([address] 4/24/2013    Recommendation refused by patient 4/24/2013     Patient refuses dialysis (hemodialysis or peritoneal dialysis)    Secondary hyperparathyroidism of renal origin ([address] 4/24/2013    Stroke ([facility] [address]      x2 1/2015    Vitamin D insufficiency 4/25/2013     Vitamin D 25-Hydroxy 20.7       Past Surgical History   Procedure Laterality Date    Hx heart catheterization  01/2015    Hx csf shunt  09/2016       Social History     Social History    Marital status: SINGLE     Spouse name: N/A    Number of children: N/A    Years of education: N/A     Occupational History    Not on file.      Social History Main Topics    Smoking status: Never Smoker    Smokeless tobacco: Never Used    Alcohol use No    Drug use: No    Sexual activity: No     Other Topics Concern    Not on file     Social History Narrative       Family History   Problem Relation Age of Onset    Hypertension Mother     Hypertension Father     Heart Attack Neg Hx     Stroke Neg Hx      Allergies   Allergen Reactions    Amlodipine Swelling    Nuts [Tree Nut] Swelling    Pcn [Penicillins] Unknown (comments)    Phenothiazines Other (comments)     Per father, \"He has a parkinsonian reaction to this medication. \"        Home Medications:     Prior to Admission Medications   Prescriptions Last Dose Informant Patient Reported? Taking?   acetaminophen (TYLENOL) 500 mg tablet Unknown at Unknown time  Yes No   Sig: Take  by mouth every six (6) hours as needed for Pain. aspirin delayed-release 81 mg tablet 2/10/2017 at Unknown time  No Yes   Sig: Take 1 tablet by mouth daily. b complex-vitamin c-folic acid (NEPHROCAPS) 1 mg capsule 2/10/2017 at Unknown time  No Yes   Sig: Take 1 Cap by mouth daily. [Request refills from PCP (Dr. Don Priest)]   calcium acetate (PHOSLO) 667 mg cap Unknown at Unknown time  No No   Sig: Take 1 Cap by mouth three (3) times daily (with meals). cholecalciferol (VITAMIN D3) 1,000 unit tablet Unknown at Unknown time  No No   Sig: Take 2 Tabs by mouth two (2) times a day. [Request refills from PCP (Dr. Don Priest)]   cloNIDine HCl (CATAPRES) 0.3 mg tablet 2/10/2017 at Unknown time  No Yes   Sig: Take 1 Tab by mouth three (3) times daily. hydrALAZINE (APRESOLINE) 100 mg tablet Unknown at Unknown time  No No   Sig: TAKE 1 TABLET BY MOUTH EVERY 8 HOURS   isosorbide mononitrate ER (IMDUR) 60 mg CR tablet Unknown at Unknown time  No No   Sig: Take 1 Tab by mouth every morning.    levETIRAcetam (KEPPRA) 250 mg tablet Unknown at Unknown time  No No   Sig: TAKE 1 TABLET BY MOUTH EVERY Monday, Wednesday, Friday AFTER EACH DIALYSIS   levETIRAcetam (KEPPRA) 500 mg tablet Unknown at Unknown time  No No   Sig: TAKE 1 TABLET BY MOUTH DAILY lisinopril (PRINIVIL, ZESTRIL) 40 mg tablet Unknown at Unknown time  No No   Sig: TAKE 1 TABLET BY MOUTH DAILY   metoprolol tartrate (LOPRESSOR) 100 mg IR tablet 2/10/2017 at Unknown time  No Yes   Sig: Take 2 Tabs by mouth every twelve (12) hours. oxyCODONE-acetaminophen (PERCOCET) 5-325 mg per tablet Unknown at Unknown time  No No   Sig: Take 1 Tab by mouth every eight (8) hours as needed. Max Daily Amount: 3 Tabs. pantoprazole (PROTONIX) 40 mg tablet Unknown at Unknown time  No No   Sig: Take 1 Tab by mouth Daily (before breakfast). [Request refills from PCP (Dr. Marcelo Priest)]   senna-docusate (PERICOLACE) 8.6-50 mg per tablet Unknown at Unknown time  No No   Sig: Take 1 Tab by mouth daily. simethicone (PHAZYME) 180 mg cap Unknown at Unknown time  Yes No   Sig: Take  by mouth. Facility-Administered Medications: None       Current Facility-Administered Medications   Medication Dose Route Frequency    calcium gluconate 1 g in 0.9% sodium chloride 50 mL  1 g IntraVENous ONCE    0.9% sodium chloride infusion  100 mL/hr IntraVENous DIALYSIS PRN    doxercalciferol (HECTOROL) 4 mcg/2 mL injection 5 mcg  5 mcg IntraVENous Q MON, WED & FRI    alteplase (CATHFLO) 2 mg in sterile water (preservative free) 2 mL injection  2 mg InterCATHeter ONCE PRN    heparin (porcine) 1,000 unit/mL injection 1,000 Units  1,000 Units InterCATHeter DIALYSIS PRN     Current Outpatient Prescriptions   Medication Sig    b complex-vitamin c-folic acid (NEPHROCAPS) 1 mg capsule Take 1 Cap by mouth daily. [Request refills from PCP (Dr. Marcelo Priest)]    cloNIDine HCl (CATAPRES) 0.3 mg tablet Take 1 Tab by mouth three (3) times daily.  metoprolol tartrate (LOPRESSOR) 100 mg IR tablet Take 2 Tabs by mouth every twelve (12) hours.  aspirin delayed-release 81 mg tablet Take 1 tablet by mouth daily.     lisinopril (PRINIVIL, ZESTRIL) 40 mg tablet TAKE 1 TABLET BY MOUTH DAILY    levETIRAcetam (KEPPRA) 250 mg tablet TAKE 1 TABLET BY MOUTH EVERY Monday, Wednesday, Friday AFTER EACH DIALYSIS    levETIRAcetam (KEPPRA) 500 mg tablet TAKE 1 TABLET BY MOUTH DAILY    acetaminophen (TYLENOL) 500 mg tablet Take  by mouth every six (6) hours as needed for Pain.  simethicone (PHAZYME) 180 mg cap Take  by mouth.  hydrALAZINE (APRESOLINE) 100 mg tablet TAKE 1 TABLET BY MOUTH EVERY 8 HOURS    calcium acetate (PHOSLO) 667 mg cap Take 1 Cap by mouth three (3) times daily (with meals).  isosorbide mononitrate ER (IMDUR) 60 mg CR tablet Take 1 Tab by mouth every morning.  oxyCODONE-acetaminophen (PERCOCET) 5-325 mg per tablet Take 1 Tab by mouth every eight (8) hours as needed. Max Daily Amount: 3 Tabs.  senna-docusate (PERICOLACE) 8.6-50 mg per tablet Take 1 Tab by mouth daily.  pantoprazole (PROTONIX) 40 mg tablet Take 1 Tab by mouth Daily (before breakfast). [Request refills from PCP (Dr. Vinita Priest)]    cholecalciferol (VITAMIN D3) 1,000 unit tablet Take 2 Tabs by mouth two (2) times a day. [Request refills from PCP (Dr. Vinita Priest)]       Review of Systems:   Pertinent items are noted in HPI. Data Review:    Labs: Results:       Chemistry Recent Labs      02/10/17   1407   GLU  107*   NA  140   K  7.6*   CL  102   CO2  17*   BUN  117*   CREA  11.00*   CA  7.8*   AGAP  21*   BUCR  11*      CBC w/Diff Recent Labs      02/10/17   1407   WBC  10.7   RBC  5.08   HGB  14.4   HCT  44.4   PLT  370   GRANS  82*   LYMPH  10*   EOS  0      Coagulation No results for input(s): PTP, INR, APTT in the last 72 hours. No lab exists for component: INREXT    Iron/Ferritin No results for input(s): IRON in the last 72 hours. No lab exists for component: TIBCCALC   BNP No results for input(s): BNPP in the last 72 hours. Cardiac Enzymes No results for input(s): CPK, CKND1, SANDRA in the last 72 hours.     No lab exists for component: CKRMB, TROIP   Liver Enzymes No results for input(s): TP, ALB, TBIL, AP, SGOT, GPT in the last 72 hours. No lab exists for component: DBIL   Thyroid Studies Lab Results   Component Value Date/Time    TSH 1.71 12/05/2016 04:48 PM           IMAGES: CXR mild pulmo congestion, bilateral pleural effusion left >right  EKG SR Peaked, T waves, prolonged QT      Physical Assessment:     Visit Vitals    BP (!) 216/163     There were no vitals filed for this visit. No intake or output data in the 24 hours ending 02/10/17 1529    Physial Exam:  General appearance: alert, cooperative, no distress, appears stated age  Skin: dry no rash  HEENT:non icteric, pinkish conj  Neck: Left IJ TDC dressing in place  Lungs: clear to auscultation bilaterally  Heart: regular rate and rhythm, S1, S2 normal, no murmur, click, rub or gallop  Abdomen: soft, non-tender. Bowel sounds normal. No masses,  no organomegaly  Extremities: trace LE edema, left arm  leo AVF + bruit    IMPRESSION AND PLAN:   ESRD with uremia, hyperkalemia, metabolic acidosis from missed HD treatments. Discussed with pt and father. Will schedule emergent HD to correct the elevated K as this is a high risk for cardiac arrhythmia/arrest. Discuss with Dr Jackie Sims, will start with meds ( IV calcium, Insulin/glucose, albuterol, kayexalate) pending start of HD. Will most likely benefit from HD again tomorrow. HTN uncontrolled resume home meds   2nd HPTH, check Phos and cont with binders, hectorol and sensipar       Thank you will follow with you.     David Howard MD  February 10, 2017

## 2017-02-11 VITALS
SYSTOLIC BLOOD PRESSURE: 145 MMHG | OXYGEN SATURATION: 96 % | TEMPERATURE: 96 F | RESPIRATION RATE: 16 BRPM | HEART RATE: 72 BPM | DIASTOLIC BLOOD PRESSURE: 95 MMHG

## 2017-02-11 PROCEDURE — 99218 HC RM OBSERVATION: CPT

## 2017-02-11 PROCEDURE — 90935 HEMODIALYSIS ONE EVALUATION: CPT

## 2017-02-11 PROCEDURE — 74011250636 HC RX REV CODE- 250/636: Performed by: HOSPITALIST

## 2017-02-11 PROCEDURE — 74011250637 HC RX REV CODE- 250/637: Performed by: INTERNAL MEDICINE

## 2017-02-11 RX ORDER — HEPARIN SODIUM 1000 [USP'U]/ML
INJECTION, SOLUTION INTRAVENOUS; SUBCUTANEOUS
Status: DISCONTINUED
Start: 2017-02-11 | End: 2017-02-11 | Stop reason: HOSPADM

## 2017-02-11 RX ORDER — CALCIUM ACETATE 667 MG/1
4 CAPSULE ORAL
Status: DISCONTINUED | OUTPATIENT
Start: 2017-02-11 | End: 2017-02-11 | Stop reason: HOSPADM

## 2017-02-11 RX ORDER — LABETALOL HCL 20 MG/4 ML
20 SYRINGE (ML) INTRAVENOUS ONCE
Status: COMPLETED | OUTPATIENT
Start: 2017-02-11 | End: 2017-02-11

## 2017-02-11 RX ADMIN — ASCORBIC ACID, THIAMINE MONONITRATE,RIBOFLAVIN, NIACINAMIDE, PYRIDOXINE HYDROCHLORIDE, FOLIC ACID, CYANOCOBALAMIN, BIOTIN, CALCIUM PANTOTHENATE, 1 CAPSULE: 100; 1.5; 1.7; 20; 10; 1; 6000; 150000; 5 CAPSULE, LIQUID FILLED ORAL at 09:37

## 2017-02-11 RX ADMIN — VITAMIN D, TAB 1000IU (100/BT) 2000 UNITS: 25 TAB at 09:37

## 2017-02-11 RX ADMIN — STANDARDIZED SENNA CONCENTRATE AND DOCUSATE SODIUM 1 TABLET: 8.6; 5 TABLET, FILM COATED ORAL at 09:36

## 2017-02-11 RX ADMIN — CALCIUM ACETATE 2668 MG: 667 CAPSULE ORAL at 18:22

## 2017-02-11 RX ADMIN — LABETALOL HYDROCHLORIDE 20 MG: 5 INJECTION, SOLUTION INTRAVENOUS at 04:30

## 2017-02-11 RX ADMIN — METOPROLOL TARTRATE 200 MG: 50 TABLET ORAL at 01:14

## 2017-02-11 RX ADMIN — LISINOPRIL 40 MG: 20 TABLET ORAL at 09:37

## 2017-02-11 RX ADMIN — ASPIRIN 81 MG: 81 TABLET, COATED ORAL at 09:37

## 2017-02-11 RX ADMIN — HYDRALAZINE HYDROCHLORIDE 100 MG: 50 TABLET, FILM COATED ORAL at 09:36

## 2017-02-11 RX ADMIN — METOPROLOL TARTRATE 200 MG: 50 TABLET ORAL at 09:37

## 2017-02-11 RX ADMIN — LEVETIRACETAM 500 MG: 500 TABLET ORAL at 09:37

## 2017-02-11 RX ADMIN — CLONIDINE HYDROCHLORIDE 0.3 MG: 0.1 TABLET ORAL at 09:36

## 2017-02-11 RX ADMIN — PANTOPRAZOLE SODIUM 40 MG: 40 TABLET, DELAYED RELEASE ORAL at 09:37

## 2017-02-11 RX ADMIN — OXYCODONE HYDROCHLORIDE AND ACETAMINOPHEN 1 TABLET: 5; 325 TABLET ORAL at 12:37

## 2017-02-11 RX ADMIN — ISOSORBIDE MONONITRATE 60 MG: 30 TABLET, EXTENDED RELEASE ORAL at 09:36

## 2017-02-11 NOTE — ED NOTES
Hourly rounding performed. Pt resting comfortably in bed, NAD, AxOx4. Pt denies need for toileting, call bell in reach.   Pt given jello, per request.

## 2017-02-11 NOTE — PROGRESS NOTES
Hemodialysis Rounding Note      Patient: Nancy Lauren               Sex: male          DOA: 2/10/2017 12:53 PM        YOB: 1970      Age:  51 y.o.        LOS:  LOS: 0 days     Subjective:     Nancy Lauren is a 51 y.o.  who presents with acute hyperkalemia, missed dialysis. The patient is dialyzing utilizing the following method:Intermittent Hemodialysis        Complaints none.      Current Facility-Administered Medications   Medication Dose Route Frequency    0.9% sodium chloride infusion  100 mL/hr IntraVENous DIALYSIS PRN    doxercalciferol (HECTOROL) 4 mcg/2 mL injection 5 mcg  5 mcg IntraVENous Q MON, WED & FRI    alteplase (CATHFLO) 2 mg in sterile water (preservative free) 2 mL injection  2 mg InterCATHeter ONCE PRN    heparin (porcine) 1,000 unit/mL injection 1,000 Units  1,000 Units InterCATHeter DIALYSIS PRN    acetaminophen (TYLENOL) tablet 500 mg  500 mg Oral Q6H PRN    [START ON 2/11/2017] B complex-vitaminC-folic acid (NEPHROCAP) cap  1 Cap Oral DAILY    [START ON 2/11/2017] aspirin delayed-release tablet 81 mg  81 mg Oral DAILY    calcium acetate (PHOSLO) capsule 667 mg  1 Cap Oral TID WITH MEALS    cholecalciferol (VITAMIN D3) tablet 2,000 Units  2,000 Units Oral BID    cloNIDine HCl (CATAPRES) tablet 0.3 mg  0.3 mg Oral TID    hydrALAZINE (APRESOLINE) tablet 100 mg  100 mg Oral Q8H    [START ON 2/11/2017] isosorbide mononitrate ER (IMDUR) tablet 60 mg  60 mg Oral 7am    levETIRAcetam (KEPPRA) tablet 250 mg  250 mg Oral Q MON, WED & FRI    [START ON 2/11/2017] levETIRAcetam (KEPPRA) tablet 500 mg  500 mg Oral DAILY    [START ON 2/11/2017] lisinopril (PRINIVIL, ZESTRIL) tablet 40 mg  40 mg Oral DAILY    metoprolol tartrate (LOPRESSOR) tablet 200 mg  200 mg Oral Q12H    oxyCODONE-acetaminophen (PERCOCET) 5-325 mg per tablet 1 Tab  1 Tab Oral Q4H PRN    [START ON 2/11/2017] pantoprazole (PROTONIX) tablet 40 mg  40 mg Oral ACB    [START ON 2017] senna-docusate (PERICOLACE) 8.6-50 mg per tablet 1 Tab  1 Tab Oral DAILY     Current Outpatient Prescriptions   Medication Sig    b complex-vitamin c-folic acid (NEPHROCAPS) 1 mg capsule Take 1 Cap by mouth daily. [Request refills from PCP (Dr. Jessica Priest)]    cloNIDine HCl (CATAPRES) 0.3 mg tablet Take 1 Tab by mouth three (3) times daily.  metoprolol tartrate (LOPRESSOR) 100 mg IR tablet Take 2 Tabs by mouth every twelve (12) hours.  aspirin delayed-release 81 mg tablet Take 1 tablet by mouth daily.  lisinopril (PRINIVIL, ZESTRIL) 40 mg tablet TAKE 1 TABLET BY MOUTH DAILY    levETIRAcetam (KEPPRA) 250 mg tablet TAKE 1 TABLET BY MOUTH EVERY Monday, Wednesday, Friday AFTER EACH DIALYSIS    levETIRAcetam (KEPPRA) 500 mg tablet TAKE 1 TABLET BY MOUTH DAILY    acetaminophen (TYLENOL) 500 mg tablet Take  by mouth every six (6) hours as needed for Pain.  simethicone (PHAZYME) 180 mg cap Take  by mouth.  hydrALAZINE (APRESOLINE) 100 mg tablet TAKE 1 TABLET BY MOUTH EVERY 8 HOURS    calcium acetate (PHOSLO) 667 mg cap Take 1 Cap by mouth three (3) times daily (with meals).  isosorbide mononitrate ER (IMDUR) 60 mg CR tablet Take 1 Tab by mouth every morning.  oxyCODONE-acetaminophen (PERCOCET) 5-325 mg per tablet Take 1 Tab by mouth every eight (8) hours as needed. Max Daily Amount: 3 Tabs.  senna-docusate (PERICOLACE) 8.6-50 mg per tablet Take 1 Tab by mouth daily.  pantoprazole (PROTONIX) 40 mg tablet Take 1 Tab by mouth Daily (before breakfast). [Request refills from PCP (Dr. Jessica Priest)]    cholecalciferol (VITAMIN D3) 1,000 unit tablet Take 2 Tabs by mouth two (2) times a day. [Request refills from PCP (Dr. Jessica Priest)]                 Objective:     Blood pressure (!) 161/112, pulse 63, temperature 98.7 °F (37.1 °C), resp. rate 20, SpO2 100 %.   Temp (24hrs), Av.7 °F (37.1 °C), Min:98.7 °F (37.1 °C), Max:98.7 °F (37.1 °C)      Blood Pressure: BP: (!) 161/112 (per dr Ena Pemberton pt to go to dialysis on [facility] )  Pulse: Pulse (Heart Rate): 63  Temp:  Temp: 98.7 °F (37.1 °C)    Artificial Kidney     hours     Heparin Bolus    Blood flow rate     Dialysate rate     Arterial Access Pressure    Venous Return Pressure    Ultrafiltration Rate    Fluid Removal    Net Fluid Removal        PHYSICAL EXAM:   HEENT:  Non icteric  NECK:  Right IJ TDC  CHEST AND LUNGS:  Clear ant/lat  CVS:  regular  EXT:  Left arm avf+ bruit    DATA REVIEW:    Labs: Results:       Chemistry Recent Labs      02/10/17   1407   GLU  107*   NA  140   K  7.6*   CL  102   CO2  17*   BUN  117*   CREA  11.00*   CA  7.8*   AGAP  21*   BUCR  11*      CBC w/Diff Recent Labs      02/10/17   1407   WBC  10.7   RBC  5.08   HGB  14.4   HCT  44.4   PLT  370   GRANS  82*   LYMPH  10*   EOS  0      Coagulation No results for input(s): PTP, INR, APTT in the last 72 hours. No lab exists for component: INREXT    Iron/Ferritin No results for input(s): IRON in the last 72 hours. No lab exists for component: TIBCCALC   BNP No results for input(s): BNPP in the last 72 hours. Cardiac Enzymes No results for input(s): CPK, CKND1, SANDRA in the last 72 hours. No lab exists for component: CKRMB, TROIP   Liver Enzymes No results for input(s): TP, ALB, TBIL, AP, SGOT, GPT in the last 72 hours. No lab exists for component: DBIL   Thyroid Studies Lab Results   Component Value Date/Time    TSH 1.71 12/05/2016 04:48 PM          Images:  XR Results (maximum last 3): Results from [place] encounter on 02/10/17   XR FOOT RT AP/LAT   Narrative Procedure:  Right foot series. Indication:  Tophi on the great toe. Comparison:  None. Findings:  Frontal and lateral views of the right foot. No convincing evidence of acute fracture or subluxation is identified.    Extensive quite dense calcific debris or periarticular calcifications are  identified around the 1st interphalangeal joint, with associated soft tissue  prominence. Because of the quite pronounced calcific densities, the underlying  osseous structures are difficult to evaluate confidently. There appears to be a transversely oriented irregular lucency through the 1st  proximal phalangeal head region. Whether this is an artifact from the presence  of extensive periarticular calcifications or this indicates the presence of  proximal phalangeal fracture is unclear. The 1st proximal phalangeal base reveals a focus of cortical erosion along the  tibial aspect. No definite overhanging margin is demonstrated however. Subtle  adjacent calcifications are observed. There are additional scattered  well-circumscribed calcific debris osseous debris around the 1st  metatarsophalangeal joint. The 1st metatarsophalangeal joint demonstrate joint  space narrowing. The ankle joint demonstrate markedly prominent well-circumscribed calcific or  osseous bodies around the joint. These calcific densities are atypical for  tophi. Extensive vascular calcifications are identified. Please note that the  technologist did not remove the nonslip foot cover which contributed to  difficulty in evaluating the calcifications. Impression Impression:    1. Atypical extensive calcifications or calcific bodies around the joints,  particularly the tibiotalar joint and at the 1st interphalangeal joint. Atypical pattern and location for tophi development. These does appear to be a  focal erosion at the 1st proximal phalangeal base. Gouty arthritis cannot be  excluded. 2.  1st proximal phalangeal head fracture vs. artifact from presence of multiple  dense calcific bodies around the 1st interphalangeal joint. Please correlate  with physical exam findings. 3.  Diabetes mellitus. XR CHEST PORT   Narrative CHEST PORTABLE    CPT CODE: 19214    COMPARISON: December 23, 2016    INDICATIONS: Bilateral lower extremity swelling. Shortness of breath. Recent  collapse. FINDINGS: There is a left-sided dialysis catheter with its tip in the right  atrium. The heart is enlarged. Lung volumes are low. There is increased density  in the lung bases bilaterally left side greater than right likely related to  atelectasis and pleural fluid. There is an infiltrative process in the left  midlung field. Mild pulmonary vascular congestion may be present. Impression Impression:    Dialysis catheter in good position. Cardiomegaly. Increased density lung bases  left side greater than right likely related atelectasis and pleural fluid. There  is a more focal infiltrate in the left midlung field which could represent  pneumonia or atypical pulmonary edema. There is evidence of mild pulmonary  vascular congestion. Results from [place] encounter on 12/23/16   XR CHEST PORT   Narrative AP portable chest.    CPT code: 46937. Indications: Chest pain, dyspnea, end-stage renal disease on dialysis. Symptom  onset yesterday. Recently had to skip a dialysis session. Single frontal view at 1713 hours has most recently 12/7/2016 comparison. Findings: Moderate hypoinflation. Cardiomegaly. Vascular engorgement and indistinctness. There is some sulcal blunting. Partial silhouetting right hemidiaphragm more  than left. Left-sided dialysis catheter with tip at right atrium. Impression IMPRESSION[de-identified]    1.  Findings are consistent with fluid overload with vascular engorgement and  likely small pleural effusions. Does look progressed. Some accompanying basilar  atelectasis partly compounded by hypoinflation. CT Results (maximum last 3): Results from [place] encounter on 12/05/16   CT HEAD WO CONT   Narrative CT scan of brain, without intravenous contrast enhancement:    INDICATION:    Seizure. History of end-stage renal disease, cardiomyopathy, hypertension, and anemia.         TECHNIQUE:    Contiguous 5 mm thick axial sections of brain are obtained without intravenous  contrast. Coronal and sagittal images are reformatted. All CT scans at this facility are performed using dose optimization technique as  appropriate to a  performed  examination, to include automated exposer control,  adjustment mA and / or  KV according to patient size (including appropriate  matching  for site specific examination), or use  of iterative  reconstruction  technique. COMPARISON STUDY: CT scan of brain dated 04/19/2013. FINDINGS:        No evidence of intracranial hemorrhage. There are findings of mild to moderate cortical atrophy changes in the frontal  lobes and upper parietal lobes bilaterally. Cerebral ventricles are of normal  size, without midline shift. In periventricular and central white matter there  are findings of moderate chronic microvascular ischemic changes, which are more  pronounced as compared to previous study. There is no definable focal hypodensity or focal infarction in cerebral cortex  in either side. There is no definable focal abnormality in basal area structures of both sides,  or in cerebellum. No definable intracranial mass lesion or mass effect. No diagnostic finding in calvarium, base of skull, visualized upper portions of  both orbits or in visualized upper portions of paranasal sinuses. Impression IMPRESSION:    No evidence of intracranial hemorrhages or any other definable acute  intracranial process. Results from [place] encounter on 09/10/16   CT ABD PELV W WO CONT   Narrative CT ABDOMEN AND PELVIS WITHOUT AND WITH INTRAVENOUS CONTRAST    COMPARISON: September 16. INDICATIONS: Renal mass. TECHNIQUE: Noncontrast CT was performed of the abdomen and pelvis    . Contrast enhanced CT was then performed following the uneventful administration  of 80 cc of Isovue-300.  Volumetric data acquisition was performed of the abdomen  and pelvis on a multislice scanner and reconstructed in axial coronal and  sagittal planes,    CT ABDOMEN WITHOUT:    FINDINGS:    Non Contrast Images Demonstrate:     Pathologic Calcifications: Vascular calcifications are noted. .  Renal Calculi : Not present. Ureteral Calculi: Not present. CT PELVIS WITHOUT:     Ureteral Calculi: Not present. Bladder Calculi: Not present. Following Contrast Administration:    CT ABDOMEN FINDINGS:    Lung Bases: Small pleural effusions are present bilaterally. There is bibasilar  atelectasis. Liver: The liver is homogeneous in density. The biliary tree is not dilated. Denys Pickerel Spleen: There is a small accessory splenule. Otherwise negative. Left Kidney: Atrophic. A 2 cm cyst is seen in the upper pole. There is mild  thickening of the wall at 2 mm. Right Kidney: 2.5 and 1.5 cm simple appearing cysts are seen in the lower pole. Several subcentimeter hypodensities are noted which cannot be characterized. No  convincing solid or enhancing lesion is evident. Pancreas: Unremarkable. Adrenal Glands: Normal.  Stomach, Small Bowel And Colon: The stomach is unremarkable. Small bowel is not  distended. There is a large volume of stool throughout the colon. There is  extensive diverticulosis. The abdominal aorta and IVC are unremarkable. A few mildly prominent lymph nodes are evident . Peritoneal Spaces: There is a large volume of intraperitoneal fluid. There is an  enhancing rind posteriorly most prominent on the right. There are some areas of  intermediate attenuation apparently representing hemorrhage. The deep dependent  increased density evident on the prior CT is much less prominent on the current  study. There is some nodularity in the omentum and in the left upper quadrant. CT PELVIS FINDINGS:     Bladder: Unremarkable. Pelvic Small Bowel And Colon: There is diverticulosis without evidence of  diverticulitis. Lymph Nodes: There is no convincing lymphadenopathy evident. Free Fluid:  There is moderate volume free fluid within the pelvis. There is also  a region of complex fluid or encapsulated fluid in the deep pelvis measuring 7.5  x 3.5 cm with low attenuation surrounded by a 1 cm rind. Osseous Structures Of Abdomen And Pelvis: Erosions are noted in the sacroiliac  joints suggesting hyperparathyroidism. Impression IMPRESSION:     Renal cysts are noted; definite solid or enhancing lesion is not identified. Arch volume ascites is evident. There appears to be at least 6 component of  hemoperitoneum. There is also nodularity of the omentum and peritoneal surface  thickening suspicious for carcinomatosis. .          All CT scans at this facility are performed using dose optimization technique as  appropriate to the performed exam, to include automated exposure control,  adjustment of the mA and/or kV according to patient's size (Including  appropriate matching for site-specific examinations), or use of iterative  reconstruction technique. CT CHEST ABD PELV W CONT   Narrative CT CHEST, ABDOMEN AND PELVIS WITH ENHANCEMENT    INDICATION: Undiagnosed malignancy, lymphoma. Dialysis. TECHNIQUE: Axial images obtained of the chest, abdomen and pelvis following the  uneventful administration of 100 cc Isovue-300      nonionic intravenous  contrast.  Coronal and sagittal reformatted images obtained. All CT scans are  performed using dose optimization techniques as appropriate to the performed  exam including the following: Automated exposure control, adjustment of mA  and/or kV according to patient size, and use of iterative reconstructive  technique. COMPARISON: None. CHEST FINDINGS:  Lung: Small calcified granulomas in the right lung. Mild dependent bilateral  lower lobe atelectasis adjacent to pleural effusions. Subtle increased  groundglass opacity in the perihilar right lung. Pleura: Small bilateral pleural effusions. Heart: No effusion. Vessels: Unremarkable.   Lymph Nodes: Unremarkable. Thyroid: Unremarkable. ABDOMEN FINDINGS:   Liver: Unremarkable. Biliary: Unremarkable. Spleen: Unremarkable. Pancreas: Unremarkable. Adrenal Glands: Unremarkable. Kidneys: Atrophic kidneys with several low-density cysts. Some hypodense lesions  are too small to characterize. 12 mm nodule posterior right lower pole is the  most indeterminate. No hydronephrosis. Peritoneum/ Retroperitoneum: Large volume ascites. Thickening of the peritoneum  with hazy adjacent intermediate density versus enhancing material in the right  paracolic gutter (image 97)  Lymph Nodes: Borderline enlarged 1 cm aortocaval lymph node on image 95. No  other adenopathy. Small inguinal lymph nodes and small axillary lymph nodes. Vessels: Calcific plaque along small vessels. No aortic aneurysm. PELVIS FINDINGS:   Bowel: Diverticulosis. Moderate-large volume stool throughout the right colon,  mild on the left. No small bowel distention. Bladder/ Pelvic Organs: The bladder is collapsed and limited. Bones/Soft tissues: No suspicious bone lesions. Impression IMPRESSION:    1. Large volume ascites. Peritoneal thickening and indistinct material within  the fluid along the right paracolic gutter could be secondary to peritonitis,  malignancy with enhancing tissue, or inflammation with adjacent complex  fluid/clot. 2. Mildly enlarged 1 cm aortocaval lymph node. No other adenopathy. 3. Small bilateral pleural effusions with adjacent atelectasis. Question of  subtle edema in the right lung, versus artifact. 4. Atrophic native kidneys with cysts. At least one small lesion at the right  kidney is indeterminate for solid mass versus complex cyst, not fully evaluated. Renal mass protocol CT or MRI imaging could be utilized. 5. Diverticulosis. 6. Right greater than left stool burden. No definitive contributing colonic mass  but evaluation is limited by ascites and stool. CULTURE:   )No results for input(s): SDES, CULT in the last 72 hours. No results for input(s): CULT in the last 72 hours. Assessment/Plan:     End Stage Renal Disease:  Patient is tolerating dialysis treatment well. .  Additionally the patient has experienced normal dialysis treatment during dialysis. Dry weight   same. Hyperkalemia repeat K in am, HD again tomorrow    At 7:15 PM on 2/10/2017, I saw and examined patient during hemodialysis treatment. The patient was receiving hemodialysis for treatment of end stage renal disease. I have also reviewed vital signs, intake and output, lab results and recent events, and agreed with today's dialysis order. Anemia:  Hold off QUAN     Renal Metabolic Bone Disease:  Cont binders                      Hypertension: uncontrolled - medication changes/orders    Access:  Tunnel cuff catheter and Fistula adequate monitoring/no changes         Franca Rojas MD  2/10/2017

## 2017-02-11 NOTE — DIALYSIS
ACUTE HEMODIALYSIS FLOW SHEET    HEMODIALYSIS ORDERS: Physician: pedro      Dialyzer: jackson         Duration: 3 hr  BFR: 400   DFR: 800   Dialysate:  Temp *37 K+   2    Ca+  3 Na 140 Bicarb 30   Weight:   kg    Bed Scale []     Unable to Obtain [x]      Dry weight/UF Goal: 3000 Access CVL  Needle Gauge     Heparin []  Bolus      Units    [] Hourly       Units    [x]None     Catheter locking solution heparin   Pre BP:   169/121    Pulse:     63     Temperature:   96.1  Respirations: 18  Tx: NS       ml/Bolus  Other        [x] N/A   Labs: Pre        Post:        [x] N/A   Additional Orders(medications, blood products, hypotension management):       [x] N/A     [x] Time Out/Safety Check  [x] [facility] Consent Verified     CATHETER ACCESS: []N/A   []Right   [x]Left   [x]IJ     []Fem   [] First use X-ray verified     [x]Tunnel                [] Non Tunneled   [x]No S/S infection  []Redness  []Drainage []Cultured []Swelling []Pain   [x]Medical Aseptic Prep Utilized   []Dressing Changed  [] Biopatch  Date:       []Clotted   [x]Patent   Flows: [x]Good  []Poor  []Reversed   If access problem,  notified: []Yes    [x]N/A  Date:           GRAFT/FISTULA ACCESS:  []N/A     []Right     [x]Left     [x]UE     []LE   []AVG   [x]AVF        []Buttonhole    []Medical Aseptic Prep Utilized   [x]No S/S infection  []Redness  []Drainage []Cultured []Swelling []Pain    Bruit:   [x] Strong    [] Weak       Thrill :   [x] Strong    [] Weak       Needle Gauge:    Length:     If access problem,  notified: []Yes     [x]N/A  Date:        Please describe access if present and not used:not fully mature       GENERAL ASSESSMENT:    LUNGS:  Rate  SaO2%        [x] N/A    [x] Clear  [] Coarse  [] Crackles  [] Wheezing        [] Diminished     Location : []RLL   []LLL    []RUL  []RADHA   Cough: []Productive  []Dry  [x]N/A   Respirations:  [x]Easy  []Labored   Therapy:  [x]RA  []NC  l/min    Mask: []NRB []Venti       O2% []Ventilator  []Intubated  [] Trach  [] BiPaP   CARDIAC: [x]Regular      [] Irregular   [] Pericardial Rub  [] JVD        []  Monitored  [] Bedside  [] Remotely monitored [] N/A  Rhythm:    EDEMA: [] None  [x]Generalized  [] Pitting [] 1    [] 2    [] 3    [] 4                 [] Facial  [] Pedal  []  UE  [] LE   SKIN:   [x] Warm  [] Hot     [] Cold   [x] Dry     [] Pale   [] Diaphoretic                  [] Flushed  [] Jaundiced  [] Cyanotic  [] Rash  [] Weeping   LOC:    [x] Alert      [x]Oriented:    [x] Person     [x] Place  [x]Time               [] Confused  [] Lethargic  [] Medicated  [] Non-responsive     GI / ABDOMEN:  [] Flat    [] Distended    [x] Soft    [] Firm   []  Obese                             [] Diarrhea  [x] Bowel Sounds  [] Nausea  [] Vomiting       / URINE ASSESSMENT:[] Voiding   [x] Oliguria  [] Anuria   []  Bailey     [] Incontinent    []  Incontinent Brief      []  Bathroom Privileges     PAIN: [x] 0 []1  []2   []3   []4   []5   []6   []7   []8   []9   []10            Scale 0-10  Action/Follow Up:    MOBILITY:  [] Amb    [] Amb/Assist    [x] Bed    [] Wheelchair  [] Stretcher      All Vitals and Treatment Details on Attached [address]: [facility]          Room # 3     [] 1st Time Acute  [] Stat  [x] Routine  [] Urgent     [x] Acute Room  []  Bedside  [] ICU/CCU  [] ER   Isolation Precautions:  [x] Dialysis   [] Airborne   [] Contact    [] Reverse   Special Considerations:         [] Blood Consent Verified [x]N/A     ALLERGIES:   [] NKA          Reaction Severity Reaction Type Noted Valid Until Updated            Allergies   Amlodipine Swelling   3/19/2015  Past Updates. .. Nuts [Tree Nut] Swelling   4/19/2013  Past Updates. .. Pcn [Penicillins] Unknown (comments)   5/29/2013  Past Updates. ..    Phenothiazines              Code Status:  [x] Full Code  [] DNR  [] Other           HBsAg ONLY: Date Drawn 09/11/16         [x]Negative []Positive []Unknown   HBsAb: Date 09/11/16    [] Susceptible   [x] [id number] []Not Drawn  [] Drawn     Current Labs:    Date of Labs: Today [x]        Cut and paste current labs here.                                                                                                                                   DIET:  [x] Renal    [] Other     [] NPO     []  Diabetic      PRIMARY NURSE REPORT: First initial/Last name/Title      Pre Dialysis: PENG Schafer RN   Time: 6106      EDUCATION:    [x] Patient [] Other         Knowledge Basis: []None [x]Minimal [] Substantial   Barriers to learning  [x]N/A   [] Access Care     [] S&S of infection     [] Fluid Management     []K+     [x]Procedural    []Albumin     [] Medications     [] Tx Options     [] Transplant     [] Diet     [] Other   Teaching Tools:  [x] Explain  [] Demo  [] Handouts [] Video  Patient response:   [x] Verbalized understanding  [] Teach back  [] Return demonstration [] Requires follow up   Inappropriate due to            [address] Before each treatment:     Machine Number:                   [address] [facility]                                   [x] Unit Machine # 4 with centralized RO                                  [] Portable Machine #1/RO serial # [id number]                                  [] Portable Machine #2/RO serial # [id number]                                  [] Portable Machine #3/RO serial # [id number]                                                                                                       [facility] [place]                                  [] Portable Machine #11/RO serial # [id number]                                   [] Portable Machine #12/RO serial # [id number]                                  [] Portable Machine #13/RO serial #  [id number]      Alarm Test:  Pass time 5974         Other:         [x] RO/Machine Log Complete      Temp    37            [x]Extracorporeal Circuit Tested for integrity   Dialysate: pH  7.4 Conductivity: Meter   14     HD Machine   14                  TCD: 14  Dialyzer Lot # [id number]         Blood Tubing Lot # [id number]       Saline Lot #  [id number]     CHLORINE TESTING-Before each treatment and every 4 hours    Total Chlorine: [x] less than 0.1 ppm  Time: 1700 4 Hr/2nd Check Time: 2000   (if greater than 0.1 ppm from Primary then every 30 minutes from Secondary)     TREATMENT INITIATION  with Dialysis Precautions:   [x] All Connections Secured                 [x] Saline Line Double Clamped   [x] Venous Parameters Set                  [x] Arterial Parameters Set    [x] Prime Given  250 ml               [x]Air Foam Detector Engaged      Treatment Initiation Note: pt arrived in stable condition via bed CVL accessed and treatment initiated without difficulty. Dr Sridevi Moreno at bedside; VO to place on 3 Ca     Medication Dose Volume Route Initials Dialyzer Cleared: [] Good [x] Fair  [] Poor    Blood processed:  63.2 L  UF Removed  3500 Ml    Post Wt:     kg  POst BP:   232/148       Pulse: 77      Respirations: 18  Temperature: 96.4                                   Post Tx Vascular Access: AVF/AVG: Bleeding stopped Art  min. Gurmeet. Min   N/A                                   Catheter: Locking solution: Heparin 1:1000 Art. 2  Gurmeet. 2                                   Post Assessment:                                    Skin:  [x] Warm  [x] Dry [] Diaphoretic    [] Flushed  [] Pale [] Cyanotic   [facility] Signatures Title Initials  Time Lungs: [x] Clear    [] Course  [] Crackles  [] Wheezing [] Diminished   Lucía Ebbs, RN    Cardiac: [x] Regular   [] Irregular   [] Monitor  [] N/A  Rhythm:           Edema:  [x] None    [] General     [] Facial   [] Pedal    [] UE    [] LE       Pain: [x]0  []1  []2   []3  []4   []5   []6   []7   []8   []9   []10         Post Treatment Note: HD well tolerated. 3.5L UF removed. No acute distress noted during or post HD treatment.      POST TREATMENT PRIMARY NURSE HANDOFF REPORT:     First initial/Last name/Title Post Dialysis: *** Time:  2115     Abbreviations: AVG-arterial venous graft, AVF-arterial venous fistula, IJ-Internal Jugular, Subcl-Subclavian, Fem-Femoral, Tx-treatment, AP/HR-apical heart rate, DFR-dialysate flow rate, BFR-blood flow rate, AP-arterial pressure, -venous pressure, UF-ultrafiltrate, TMP-transmembrane pressure, Gurmeet-Venous, Art-Arterial, RO-Reverse Osmosis

## 2017-02-11 NOTE — ED NOTES
Assumed care of pt from francy clark , pt stable connected to cardiac monitor no s/s of cardiac or respiratory distress, pt stable no complaints at this time, continue to monitor pt

## 2017-02-11 NOTE — DIALYSIS
ACUTE HEMODIALYSIS FLOW SHEET    HEMODIALYSIS ORDERS: Physician:   Dr. Anders Mejiaters:  Virgil         Duration: 3        BFR:   350     DFR:  800       Dialysate:  K+   2.0           Ca+  3.0      Weight: Unable to obtain   kg    Bed Scale []     Unable to Obtain []      UF Goal:   3000        Heparin []  Bolus      Units    [] Hourly       Units    [x] None   Pre BP:  144/105      Pulse:   71               Temperature: 96.0        Tx: NS           ml/Bolus  Other        [] N/A   Labs: Pre        Post:        [x] N/A   Additional Orders:           [x] Time Out/Safety Check  [x] [facility] Consent Verified     CATHETER ACCESS: []N/A   []Right   [x]Left   []IJ     []Fem   [] First use X-ray verified     [x]Tunnel                [] Non Tunneled   [x]No S/S infection  []Redness  []Drainage []Cultured []Swelling []Pain   [x]Medical Aseptic Prep Utilized   []Dressing Changed  [] Biopatch  Date:       []Clotted   []Patent   Flows: [x]Good  []Poor  []Reversed   If access problem,  notified: []Yes    [x]N/A  Date:           GRAFT/FISTULA ACCESS:  [x]N/A     []Right     []Left     []UE     []LE   []AVG   []AVF        []Buttonhole    []Medical Aseptic Prep Utilized   []No S/S infection  []Redness  []Drainage []Cultured []Swelling []Pain  Bruit:   [] Strong    [] Weak       Thrill :   [] Strong    [] Weak     Needle Gauge:           Length:         If access problem,  notified: []Yes     []N/A  Date:             GENERAL ASSESSMENT:   LUNGS:  Sat%           [x] Clear  [] Coarse  [] Crackles  [] Wheezing        [] Diminished     Location : []RRL   []LLL    []RUL  []RADHA   Cough: []Productive  []Dry  [x]N/A   Respirations:  [x]Easy  []Labored   Therapy:  [x]RA  []NC         l/min    Mask: []NRB []Venti       O2%                  []Ventilator  []Intubated  []Trach   CARDIAC: [x]Regular      [] Irregular   [] Pericardial Rub  [] JVD        []  Monitored  [] N/A  Rhythm:         EDEMA: [x] None  []Generalized [] Pitting [] 1    [] 2    [] 3    [] 4                 [] Facial  [] Pedal  []  UE  [] LE   SKIN:   [x] Warm  [] Hot     [] Cold   [x] Dry     [] Pale   [] Diaphoretic                  [] Flushed  [] Jaundiced  [] Cyanotic  [] Rash  [] Weeping   LOC:    [x] Alert      [x]Oriented:    [x] Person     [x] Place  [x]Time               [] Confused  [] Lethargic  [] Medicated  [] Non-responsive     GI / ABDOMEN:  [] Flat    [] Distended    [x] Soft    [] Firm   []  Obese                             [] Diarrhea  [x] Bowel Sounds  [] Nausea  [] Vomiting       / URINE ASSESSMENT:  [x] Voiding   [] Oliguria  [] Anuria   []  Bailey     [] Incontinent    []  Incontinent Brief      []  Bathroom Privileges     PAIN: [x] 0=None []1  []2   []3   []4   []5   []6   []7   []8   []9   []10            Scale 0-10  Action/Follow Up:         MOBILITY:  [] Amb    [x] Amb/Assist    [] Bed    [] Wheelchair  [] Stretcher      All Vitals and Treatment Details on Attached [address]: [facility]             Room #  ER       [] 1st Time Acute  [] Stat  [x] Routine  [] Urgent     [x] Acute Room  []  Bedside  [] ICU/CCU  [] ER   Isolation Precautions:  [] Dialysis   [] Airborne   [] Contact    [] Reverse   Special Considerations:         [] Blood Consent Verified []N/A     ALLERGIES:   [] NKA       Amidodipine, Nuts, PCN Phenothiazines    Code Status:  [] Full Code  [] DNR  [] Other           HBsAg ONLY: Date Drawn       11/9/2016         [x]Negative []Positive []Unknown   HBsAb: Date  11/9/2016          [] Susceptible   [x] [id number] []Not Drawn      Current Labs:    Date of Labs:                 Today [x]     (Cut and Past Labs Here)                                                                                                                              DIET:  [x] Renal    [] Other     [] NPO     []  Diabetic      PRIMARY NURSE REPORT: First initial/Last name/Title      Pre Dialysis:  Butch   Time:           EDUCATION:   [x] Patient [] Other         Knowledge Basis: []None [x]Minimal []Substantial   [] Access Care     [] S&S of infection     [] Fluid Management     []K+     [x]Procedural    []Albumin     [] Medications     [] Tx Options     [] Transplant     [] Diet     [] Other   Teaching Tools:  [x] Explain  [] Demo  [] Handouts [] Video   Inappropriate due to            [address] Before each treatment:     Machine Number: 7 [] Machine/RO #             Alarm Test: [x]Pass           Other:         [x] RO/Machine Log Complete   Dialysate: pH  7.4      Temp    36.5     [x]Extracorporeal Circuit Tested for integrity   Conductivity: Meter   14.0    HD Machine 14.2          CHLORINE TESTING-Before each treatment and every 4 hours    Total Chlorine: [x] less than 0.1 ppm  Time:     4 Hr Check Time:         (if greater than 0.1 ppm from Primary then every 30 minutes from Secondary)     TREATMENT INITIATION  with Dialysis Precautions:   [x] All Connections Secured                 [x] Saline Line Double Clamped   [x] Venous Parameters Set                  [x] Arterial Parameters Set    [x] Prime Given  ml   250              [x]Air Foam Detector Engaged      Treatment Initiation Note:   Tx initiated using right subclavian TDC. Functioning well. No distrses noted. Medication Dose Volume Route Initials Dialyzer Cleared: [x] Good [] Fair  [] Poor    Blood processed:   57.9   L  UF Removed  3000    Ml    Post Wt:    POst BP:    165/124      Pulse:  72      Temperature:                                   Post Tx Vascular Access: AVF/AVG: Bleeding stopped Art      min. Gurmeet. Min                                   Catheter: Locking solution: Heparin 1:1000 Art.   2.0    Gurmeet.    2.0                                    Post Assessment:                                    Skin:    [x] Warm  [x] Dry [] Diaphoretic     [] Flushed  [] Pale [] Cyanotic   [facility] Signatures Title Initials  Time Lungs: [x] Clear    [] Course  [] Crackles  [] Wheezing       Cardiac: [x] Regular   [] Irregular   [] Monitor  [] N/A  Rhythm:           Edema:  [x] None    [] General     [] Facial   [] Pedal    [] UE    [] LE       Pain: [x]0=None  []1  []2   []3  []4   []5   []6   []7   []8   []9   []10     Did Physician make rounds during treatment:    [x] Yes     []  No    Post Treatment Note:   Tolerated HD tx well. NO distress noted post tx. 3000mAL fluid removed net. TDC wrapped in gauze and secuired with tape            POST TREATMENT PRIMARY NURSE HANDOFF REPORT:   First initial/Last name/Title  Post Dialysis:  YARELIS Everett RN        Time:            Abbreviations: AVG-arterial venous graft, AVF-arterial venous fistula, IJ-Internal Jugular, Subcl-Subclavian, Fem-Femoral, Tx-treatment, AP/HR-apical heart rate, DFR-dialysate flow rate, BFR-blood flow rate, AP-arterial pressure, -venous pressure, UF-ultrafiltrate, TMP-transmembrane pressure, Gurmeet-Venous, Art-Arterial, RO-Reverse Osmosis

## 2017-02-11 NOTE — ED NOTES
Bedside and Verbal shift change report given to Mercy Mcintosh (oncoming nurse) by Roxanne Clark RN (offgoing nurse). Report included the following information SBAR, ED Summary, MAR and Recent Results.

## 2017-02-11 NOTE — ED NOTES
Bedside and Verbal report given to Jorge Winkler RN by Davide Chicas RN. Report included the following information SBAR and ED Summary. Pt is AxOx4, NAD, sitting comfortably in bed.

## 2017-02-11 NOTE — PROGRESS NOTES
Hemodialysis Rounding Note      Patient: Lorrayne Harada               Sex: male          DOA: 2/10/2017 12:53 PM        YOB: 1970      Age:  51 y.o.        LOS:  LOS: 0 days     Subjective:     Lorrayne Harada is a 51 y.o.  who presents with acute hyperkalemia, missed dialysis.   The patient is dialyzing utilizing the following method:Intermittent Hemodialysis        Complaints: feeling better    Current Facility-Administered Medications   Medication Dose Route Frequency    calcium acetate (PHOSLO) capsule 2,668 mg  4 Cap Oral TID WITH MEALS    0.9% sodium chloride infusion  100 mL/hr IntraVENous DIALYSIS PRN    doxercalciferol (HECTOROL) 4 mcg/2 mL injection 5 mcg  5 mcg IntraVENous Q MON, WED & FRI    alteplase (CATHFLO) 2 mg in sterile water (preservative free) 2 mL injection  2 mg InterCATHeter ONCE PRN    heparin (porcine) 1,000 unit/mL injection 1,000 Units  1,000 Units InterCATHeter DIALYSIS PRN    acetaminophen (TYLENOL) tablet 500 mg  500 mg Oral Q6H PRN    B complex-vitaminC-folic acid (NEPHROCAP) cap  1 Cap Oral DAILY    aspirin delayed-release tablet 81 mg  81 mg Oral DAILY    cholecalciferol (VITAMIN D3) tablet 2,000 Units  2,000 Units Oral BID    cloNIDine HCl (CATAPRES) tablet 0.3 mg  0.3 mg Oral TID    hydrALAZINE (APRESOLINE) tablet 100 mg  100 mg Oral Q8H    isosorbide mononitrate ER (IMDUR) tablet 60 mg  60 mg Oral 7am    levETIRAcetam (KEPPRA) tablet 250 mg  250 mg Oral Q MON, WED & FRI    levETIRAcetam (KEPPRA) tablet 500 mg  500 mg Oral DAILY    lisinopril (PRINIVIL, ZESTRIL) tablet 40 mg  40 mg Oral DAILY    metoprolol tartrate (LOPRESSOR) tablet 200 mg  200 mg Oral Q12H    oxyCODONE-acetaminophen (PERCOCET) 5-325 mg per tablet 1 Tab  1 Tab Oral Q4H PRN    pantoprazole (PROTONIX) tablet 40 mg  40 mg Oral ACB    senna-docusate (PERICOLACE) 8.6-50 mg per tablet 1 Tab  1 Tab Oral DAILY     Current Outpatient Prescriptions   Medication Sig    b complex-vitamin c-folic acid (NEPHROCAPS) 1 mg capsule Take 1 Cap by mouth daily. [Request refills from PCP (Dr. Lizzie Priest)]    cloNIDine HCl (CATAPRES) 0.3 mg tablet Take 1 Tab by mouth three (3) times daily.  metoprolol tartrate (LOPRESSOR) 100 mg IR tablet Take 2 Tabs by mouth every twelve (12) hours.  aspirin delayed-release 81 mg tablet Take 1 tablet by mouth daily.  lisinopril (PRINIVIL, ZESTRIL) 40 mg tablet TAKE 1 TABLET BY MOUTH DAILY    levETIRAcetam (KEPPRA) 250 mg tablet TAKE 1 TABLET BY MOUTH EVERY Monday, Wednesday, Friday AFTER EACH DIALYSIS    levETIRAcetam (KEPPRA) 500 mg tablet TAKE 1 TABLET BY MOUTH DAILY    acetaminophen (TYLENOL) 500 mg tablet Take  by mouth every six (6) hours as needed for Pain.  simethicone (PHAZYME) 180 mg cap Take  by mouth.  hydrALAZINE (APRESOLINE) 100 mg tablet TAKE 1 TABLET BY MOUTH EVERY 8 HOURS    calcium acetate (PHOSLO) 667 mg cap Take 1 Cap by mouth three (3) times daily (with meals).  isosorbide mononitrate ER (IMDUR) 60 mg CR tablet Take 1 Tab by mouth every morning.  oxyCODONE-acetaminophen (PERCOCET) 5-325 mg per tablet Take 1 Tab by mouth every eight (8) hours as needed. Max Daily Amount: 3 Tabs.  senna-docusate (PERICOLACE) 8.6-50 mg per tablet Take 1 Tab by mouth daily.  pantoprazole (PROTONIX) 40 mg tablet Take 1 Tab by mouth Daily (before breakfast). [Request refills from PCP (Dr. Lizzie Priest)]    cholecalciferol (VITAMIN D3) 1,000 unit tablet Take 2 Tabs by mouth two (2) times a day. [Request refills from PCP (Dr. Lizzie Priest)]           1901 -  0700  In: -   Out: 3500       Objective:     Blood pressure (!) 149/105, pulse 73, temperature 96.4 °F (35.8 °C), resp. rate 21, SpO2 96 %.   Temp (24hrs), Av.9 °F (36.1 °C), Min:96.1 °F (35.6 °C), Max:98.7 °F (37.1 °C)      Blood Pressure: BP: (!) 149/105  Pulse: Pulse (Heart Rate): 73  Temp:  Temp: 96.4 °F (35.8 °C)    Artificial Kidney Dialyzer/Set Up Inspection: Revaclear   hours Duration of Treatment (hours): 3 hours   Heparin Bolus    Blood flow rate Blood Flow Rate (ml/min): 200 ml/min   Dialysate rate     Arterial Access Pressure Arterial Access Pressure (mmHg): -100  Venous Return Pressure Venous Return Pressure (mmHg): 100  Ultrafiltration Rate Goal/Amount of Fluid to Remove (mL): 3000 mL  Fluid Removal Fluid Removed (mL): 4000  Net Fluid Removal NET Fluid Removed (mL): 3500 ml      PHYSICAL EXAM: alert. Oriented x 3  HEENT:  Non icteric  NECK:  Right IJ TDC  CHEST AND LUNGS:  Clear ant/lat  CVS:  regular  EXT:  Left arm avf+ bruit    DATA REVIEW:    Labs: Results:       Chemistry Recent Labs      02/10/17   2255  02/10/17   1407   GLU  228*  107*   NA  143  140   K  4.5  7.6*   CL  101  102   CO2  23  17*   BUN  66*  117*   CREA  7.02*  11.00*   CA  9.1  7.8*   AGAP  19*  21*   BUCR  9*  11*      CBC w/Diff Recent Labs      02/10/17   1407   WBC  10.7   RBC  5.08   HGB  14.4   HCT  44.4   PLT  370   GRANS  82*   LYMPH  10*   EOS  0      Coagulation No results for input(s): PTP, INR, APTT in the last 72 hours. No lab exists for component: INREXT, INREXT    Iron/Ferritin No results for input(s): IRON in the last 72 hours. No lab exists for component: TIBCCALC   BNP No results for input(s): BNPP in the last 72 hours. Cardiac Enzymes No results for input(s): CPK, CKND1, SANDRA in the last 72 hours. No lab exists for component: CKRMB, TROIP   Liver Enzymes No results for input(s): TP, ALB, TBIL, AP, SGOT, GPT in the last 72 hours. No lab exists for component: DBIL   Thyroid Studies Lab Results   Component Value Date/Time    TSH 1.71 12/05/2016 04:48 PM          Images:  XR Results (maximum last 3): Results from [place] encounter on 02/10/17   XR FOOT RT AP/LAT   Narrative Procedure:  Right foot series. Indication:  Tophi on the great toe. Comparison:  None.     Findings:  Frontal and lateral views of the right foot.    No convincing evidence of acute fracture or subluxation is identified. Extensive quite dense calcific debris or periarticular calcifications are  identified around the 1st interphalangeal joint, with associated soft tissue  prominence. Because of the quite pronounced calcific densities, the underlying  osseous structures are difficult to evaluate confidently. There appears to be a transversely oriented irregular lucency through the 1st  proximal phalangeal head region. Whether this is an artifact from the presence  of extensive periarticular calcifications or this indicates the presence of  proximal phalangeal fracture is unclear. The 1st proximal phalangeal base reveals a focus of cortical erosion along the  tibial aspect. No definite overhanging margin is demonstrated however. Subtle  adjacent calcifications are observed. There are additional scattered  well-circumscribed calcific debris osseous debris around the 1st  metatarsophalangeal joint. The 1st metatarsophalangeal joint demonstrate joint  space narrowing. The ankle joint demonstrate markedly prominent well-circumscribed calcific or  osseous bodies around the joint. These calcific densities are atypical for  tophi. Extensive vascular calcifications are identified. Please note that the  technologist did not remove the nonslip foot cover which contributed to  difficulty in evaluating the calcifications. Impression Impression:    1. Atypical extensive calcifications or calcific bodies around the joints,  particularly the tibiotalar joint and at the 1st interphalangeal joint. Atypical pattern and location for tophi development. These does appear to be a  focal erosion at the 1st proximal phalangeal base. Gouty arthritis cannot be  excluded. 2.  1st proximal phalangeal head fracture vs. artifact from presence of multiple  dense calcific bodies around the 1st interphalangeal joint.   Please correlate  with physical exam findings. 3.  Diabetes mellitus. XR CHEST PORT   Narrative CHEST PORTABLE    CPT CODE: 86003    COMPARISON: December 23, 2016    INDICATIONS: Bilateral lower extremity swelling. Shortness of breath. Recent  collapse. FINDINGS: There is a left-sided dialysis catheter with its tip in the right  atrium. The heart is enlarged. Lung volumes are low. There is increased density  in the lung bases bilaterally left side greater than right likely related to  atelectasis and pleural fluid. There is an infiltrative process in the left  midlung field. Mild pulmonary vascular congestion may be present. Impression Impression:    Dialysis catheter in good position. Cardiomegaly. Increased density lung bases  left side greater than right likely related atelectasis and pleural fluid. There  is a more focal infiltrate in the left midlung field which could represent  pneumonia or atypical pulmonary edema. There is evidence of mild pulmonary  vascular congestion. Results from [place] encounter on 12/23/16   XR CHEST PORT   Narrative AP portable chest.    CPT code: 64058. Indications: Chest pain, dyspnea, end-stage renal disease on dialysis. Symptom  onset yesterday. Recently had to skip a dialysis session. Single frontal view at 1713 hours has most recently 12/7/2016 comparison. Findings: Moderate hypoinflation. Cardiomegaly. Vascular engorgement and indistinctness. There is some sulcal blunting. Partial silhouetting right hemidiaphragm more  than left. Left-sided dialysis catheter with tip at right atrium. Impression IMPRESSION[de-identified]    1.  Findings are consistent with fluid overload with vascular engorgement and  likely small pleural effusions. Does look progressed. Some accompanying basilar  atelectasis partly compounded by hypoinflation. CT Results (maximum last 3):     Results from [place] encounter on 12/05/16   CT HEAD WO CONT   Narrative CT scan of brain, without intravenous contrast enhancement:    INDICATION:    Seizure. History of end-stage renal disease, cardiomyopathy, hypertension, and anemia. TECHNIQUE:    Contiguous 5 mm thick axial sections of brain are obtained without intravenous  contrast. Coronal and sagittal images are reformatted. All CT scans at this facility are performed using dose optimization technique as  appropriate to a  performed  examination, to include automated exposer control,  adjustment mA and / or  KV according to patient size (including appropriate  matching  for site specific examination), or use  of iterative  reconstruction  technique. COMPARISON STUDY: CT scan of brain dated 04/19/2013. FINDINGS:        No evidence of intracranial hemorrhage. There are findings of mild to moderate cortical atrophy changes in the frontal  lobes and upper parietal lobes bilaterally. Cerebral ventricles are of normal  size, without midline shift. In periventricular and central white matter there  are findings of moderate chronic microvascular ischemic changes, which are more  pronounced as compared to previous study. There is no definable focal hypodensity or focal infarction in cerebral cortex  in either side. There is no definable focal abnormality in basal area structures of both sides,  or in cerebellum. No definable intracranial mass lesion or mass effect. No diagnostic finding in calvarium, base of skull, visualized upper portions of  both orbits or in visualized upper portions of paranasal sinuses. Impression IMPRESSION:    No evidence of intracranial hemorrhages or any other definable acute  intracranial process. Results from [place] encounter on 09/10/16   CT ABD PELV W WO CONT   Narrative CT ABDOMEN AND PELVIS WITHOUT AND WITH INTRAVENOUS CONTRAST    COMPARISON: September 16. INDICATIONS: Renal mass.     TECHNIQUE: Noncontrast CT was performed of the abdomen and pelvis .     Contrast enhanced CT was then performed following the uneventful administration  of 80 cc of Isovue-300. Volumetric data acquisition was performed of the abdomen  and pelvis on a multislice scanner and reconstructed in axial coronal and  sagittal planes,    CT ABDOMEN WITHOUT:    FINDINGS:    Non Contrast Images Demonstrate:     Pathologic Calcifications: Vascular calcifications are noted. .  Renal Calculi : Not present. Ureteral Calculi: Not present. CT PELVIS WITHOUT:     Ureteral Calculi: Not present. Bladder Calculi: Not present. Following Contrast Administration:    CT ABDOMEN FINDINGS:    Lung Bases: Small pleural effusions are present bilaterally. There is bibasilar  atelectasis. Liver: The liver is homogeneous in density. The biliary tree is not dilated. Sudie Mar Spleen: There is a small accessory splenule. Otherwise negative. Left Kidney: Atrophic. A 2 cm cyst is seen in the upper pole. There is mild  thickening of the wall at 2 mm. Right Kidney: 2.5 and 1.5 cm simple appearing cysts are seen in the lower pole. Several subcentimeter hypodensities are noted which cannot be characterized. No  convincing solid or enhancing lesion is evident. Pancreas: Unremarkable. Adrenal Glands: Normal.  Stomach, Small Bowel And Colon: The stomach is unremarkable. Small bowel is not  distended. There is a large volume of stool throughout the colon. There is  extensive diverticulosis. The abdominal aorta and IVC are unremarkable. A few mildly prominent lymph nodes are evident . Peritoneal Spaces: There is a large volume of intraperitoneal fluid. There is an  enhancing rind posteriorly most prominent on the right. There are some areas of  intermediate attenuation apparently representing hemorrhage. The deep dependent  increased density evident on the prior CT is much less prominent on the current  study. There is some nodularity in the omentum and in the left upper quadrant.         CT PELVIS FINDINGS: Bladder: Unremarkable. Pelvic Small Bowel And Colon: There is diverticulosis without evidence of  diverticulitis. Lymph Nodes: There is no convincing lymphadenopathy evident. Free Fluid: There is moderate volume free fluid within the pelvis. There is also  a region of complex fluid or encapsulated fluid in the deep pelvis measuring 7.5  x 3.5 cm with low attenuation surrounded by a 1 cm rind. Osseous Structures Of Abdomen And Pelvis: Erosions are noted in the sacroiliac  joints suggesting hyperparathyroidism. Impression IMPRESSION:     Renal cysts are noted; definite solid or enhancing lesion is not identified. Arch volume ascites is evident. There appears to be at least 6 component of  hemoperitoneum. There is also nodularity of the omentum and peritoneal surface  thickening suspicious for carcinomatosis. .          All CT scans at this facility are performed using dose optimization technique as  appropriate to the performed exam, to include automated exposure control,  adjustment of the mA and/or kV according to patient's size (Including  appropriate matching for site-specific examinations), or use of iterative  reconstruction technique. CT CHEST ABD PELV W CONT   Narrative CT CHEST, ABDOMEN AND PELVIS WITH ENHANCEMENT    INDICATION: Undiagnosed malignancy, lymphoma. Dialysis. TECHNIQUE: Axial images obtained of the chest, abdomen and pelvis following the  uneventful administration of 100 cc Isovue-300      nonionic intravenous  contrast.  Coronal and sagittal reformatted images obtained. All CT scans are  performed using dose optimization techniques as appropriate to the performed  exam including the following: Automated exposure control, adjustment of mA  and/or kV according to patient size, and use of iterative reconstructive  technique. COMPARISON: None. CHEST FINDINGS:  Lung: Small calcified granulomas in the right lung.  Mild dependent bilateral  lower lobe atelectasis adjacent to pleural effusions. Subtle increased  groundglass opacity in the perihilar right lung. Pleura: Small bilateral pleural effusions. Heart: No effusion. Vessels: Unremarkable. Lymph Nodes: Unremarkable. Thyroid: Unremarkable. ABDOMEN FINDINGS:   Liver: Unremarkable. Biliary: Unremarkable. Spleen: Unremarkable. Pancreas: Unremarkable. Adrenal Glands: Unremarkable. Kidneys: Atrophic kidneys with several low-density cysts. Some hypodense lesions  are too small to characterize. 12 mm nodule posterior right lower pole is the  most indeterminate. No hydronephrosis. Peritoneum/ Retroperitoneum: Large volume ascites. Thickening of the peritoneum  with hazy adjacent intermediate density versus enhancing material in the right  paracolic gutter (image 97)  Lymph Nodes: Borderline enlarged 1 cm aortocaval lymph node on image 95. No  other adenopathy. Small inguinal lymph nodes and small axillary lymph nodes. Vessels: Calcific plaque along small vessels. No aortic aneurysm. PELVIS FINDINGS:   Bowel: Diverticulosis. Moderate-large volume stool throughout the right colon,  mild on the left. No small bowel distention. Bladder/ Pelvic Organs: The bladder is collapsed and limited. Bones/Soft tissues: No suspicious bone lesions. Impression IMPRESSION:    1. Large volume ascites. Peritoneal thickening and indistinct material within  the fluid along the right paracolic gutter could be secondary to peritonitis,  malignancy with enhancing tissue, or inflammation with adjacent complex  fluid/clot. 2. Mildly enlarged 1 cm aortocaval lymph node. No other adenopathy. 3. Small bilateral pleural effusions with adjacent atelectasis. Question of  subtle edema in the right lung, versus artifact. 4. Atrophic native kidneys with cysts. At least one small lesion at the right  kidney is indeterminate for solid mass versus complex cyst, not fully evaluated.   Renal mass protocol CT or MRI imaging could be utilized. 5. Diverticulosis. 6. Right greater than left stool burden. No definitive contributing colonic mass  but evaluation is limited by ascites and stool. CULTURE:   )No results for input(s): SDES, CULT in the last 72 hours. No results for input(s): CULT in the last 72 hours. Assessment/Plan:     End Stage Renal Disease:  Patient is tolerating dialysis treatment well. .  Additionally the patient has experienced normal dialysis treatment during dialysis. Dry weight   same. Ok to D/C from renal standpoint      At  12:55 PM on 2/11/2017, I saw and examined patient during hemodialysis treatment. The patient was receiving hemodialysis for treatment of end stage renal disease. I have also reviewed vital signs, intake and output, lab results and recent events, and agreed with today's dialysis order. Anemia:  Hold off QUAN     Renal Metabolic Bone Disease:  Cont binders inc dose                      Hypertension: better    Access:  Tunnel cuff catheter and Fistula adequate monitoring/no changes advance AVF use as tolerated         Jet Smith MD  2/11/2017

## 2017-02-12 NOTE — ED NOTES
Discharge medications reviewed with patient and appropriate educational materials and side effects teaching were provided. Discharge medications reviewed with patient and appropriate educational materials and side effects teaching were provided. Patient armband removed and shredded

## 2017-02-14 ENCOUNTER — OFFICE VISIT (OUTPATIENT)
Dept: FAMILY MEDICINE CLINIC | Age: 47
End: 2017-02-14

## 2017-02-14 VITALS
SYSTOLIC BLOOD PRESSURE: 236 MMHG | OXYGEN SATURATION: 100 % | RESPIRATION RATE: 18 BRPM | BODY MASS INDEX: 19.88 KG/M2 | HEIGHT: 71 IN | TEMPERATURE: 96.7 F | WEIGHT: 142 LBS | DIASTOLIC BLOOD PRESSURE: 114 MMHG | HEART RATE: 74 BPM

## 2017-02-14 DIAGNOSIS — N18.6 END STAGE RENAL DISEASE (HCC): Primary | Chronic | ICD-10-CM

## 2017-02-14 DIAGNOSIS — I10 UNCONTROLLED HYPERTENSION: ICD-10-CM

## 2017-02-14 DIAGNOSIS — Z91.14 NON COMPLIANCE W MEDICATION REGIMEN: ICD-10-CM

## 2017-02-14 DIAGNOSIS — M79.605 PAIN IN BOTH LOWER EXTREMITIES: ICD-10-CM

## 2017-02-14 DIAGNOSIS — M79.604 PAIN IN BOTH LOWER EXTREMITIES: ICD-10-CM

## 2017-02-14 RX ORDER — OXYCODONE AND ACETAMINOPHEN 5; 325 MG/1; MG/1
1 TABLET ORAL
Qty: 20 TAB | Refills: 0 | Status: SHIPPED | OUTPATIENT
Start: 2017-02-14 | End: 2017-02-19

## 2017-02-14 NOTE — MR AVS SNAPSHOT
Visit Information Date & Time Provider Department Dept. Phone Encounter #  
 2/14/2017  9:30 AM Yaz Tolentino 80 [phone] [id number] Your Appointments 3/24/2017  9:30 AM  
Office Visit with MD Yaz Tolentino 80 [facility]) Appt Note: [address] [place] [id number]  
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 \" Current Immunizations  Reviewed on 6/28/2015 Name Date Influenza Vaccine (Quad) PF 9/20/2016  6:50 PM  
  
 Not reviewed this visit You Were Diagnosed With   
  
 Codes Comments End stage renal disease (RUSTca 75.)    -  Primary ICD-10-CM: N18.6 ICD-9-CM: 585.6 Uncontrolled hypertension     ICD-10-CM: I10 
ICD-9-CM: 401.9 Pain in both lower extremities     ICD-10-CM: M79.604, M79.605 ICD-9-CM: 729.5 Vitals BP Pulse Temp Resp Height(growth percentile) Weight(growth percentile) (!) 236/114 74 96.7 °F (35.9 °C) (Oral) 18 5' 11\" (1.803 m) 142 lb (64.4 kg) SpO2 BMI Smoking Status 100% 19.8 kg/m2 Never Smoker Vitals History BMI and BSA Data Body Mass Index Body Surface Area  
 19.8 kg/m 2 1.8 m 2 Preferred Pharmacy Pharmacy Name Phone [facility] [address] [facility] [phone] Your Updated Medication List  
  
   
This list is accurate as of: 2/14/17 10:18 AM.  Always use your most recent med list.  
  
  
  
  
 acetaminophen 500 mg tablet Commonly known as:  TYLENOL Take  by mouth every six (6) hours as needed for Pain. aspirin delayed-release 81 mg tablet Take 1 tablet by mouth daily. b complex-vitamin c-folic acid 1 mg capsule Commonly known as:  Audrey Flavors Take 1 Cap by mouth daily. [Request refills from PCP (Dr. Massimo Priest)]  
  
 calcium acetate 667 mg Cap Commonly known as:  PHOSLO Take 1 Cap by mouth three (3) times daily (with meals). cholecalciferol 1,000 unit tablet Commonly known as:  VITAMIN D3 Take 2 Tabs by mouth two (2) times a day. [Request refills from PCP (Dr. Massimo Priest)]  
  
 cloNIDine HCl 0.3 mg tablet Commonly known as:  CATAPRES Take 1 Tab by mouth three (3) times daily. hydrALAZINE 100 mg tablet Commonly known as:  APRESOLINE  
TAKE 1 TABLET BY MOUTH EVERY 8 HOURS  
  
 isosorbide mononitrate ER 60 mg CR tablet Commonly known as:  IMDUR Take 1 Tab by mouth every morning. * levETIRAcetam 250 mg tablet Commonly known as:  KEPPRA TAKE 1 TABLET BY MOUTH EVERY Monday, Wednesday, Friday AFTER EACH DIALYSIS  
  
 * levETIRAcetam 500 mg tablet Commonly known as:  KEPPRA TAKE 1 TABLET BY MOUTH DAILY  
  
 lisinopril 40 mg tablet Commonly known as:  PRINIVIL, ZESTRIL  
TAKE 1 TABLET BY MOUTH DAILY  
  
 metoprolol tartrate 100 mg IR tablet Commonly known as:  LOPRESSOR Take 2 Tabs by mouth every twelve (12) hours. * oxyCODONE-acetaminophen 5-325 mg per tablet Commonly known as:  PERCOCET Take 1 Tab by mouth every eight (8) hours as needed. Max Daily Amount: 3 Tabs. * oxyCODONE-acetaminophen 5-325 mg per tablet Commonly known as:  PERCOCET Take 1 Tab by mouth every four (4) hours as needed for Pain for up to 5 days. Max Daily Amount: 6 Tabs. pantoprazole 40 mg tablet Commonly known as:  PROTONIX Take 1 Tab by mouth Daily (before breakfast). [Request refills from PCP (Dr. Anitra Priest)] PHAZYME 180 mg Cap Generic drug:  simethicone Take  by mouth. senna-docusate 8.6-50 mg per tablet Commonly known as:  Villeda Resides Take 1 Tab by mouth daily. * Notice: This list has 4 medication(s) that are the same as other medications prescribed for you. Read the directions carefully, and ask your doctor or other care provider to review them with you. Prescriptions Printed Refills  
 oxyCODONE-acetaminophen (PERCOCET) 5-325 mg per tablet 0 Sig: Take 1 Tab by mouth every four (4) hours as needed for Pain for up to 5 days. Max Daily Amount: 6 Tabs. Class: Print Route: Oral  
  
 Please provide this summary of care documentation to your next provider. Your primary care clinician is listed as Harrison Haas. If you have any questions after today's visit, please call [phone].

## 2017-02-14 NOTE — PROGRESS NOTES
Chronic Illness Visit    Today's Date:  2017   Patient's Name: Nancy Lauren   Patient's :  1970     History:     Chief Complaint   Patient presents with    Follow Up Chronic Condition     HTN     Leg Pain     pt presents for leg pain in left and right leg pt states \" that it was a struggle for pt to get here today and that pt was pulled really hard yesterday at Dialysis  and need something for pain \"    Toe Pain     Ulcer on great toe right foot       Nancy Lauren is a 51 y.o. male presenting for a follow up of Chronic Illness. Since last visit patient continues to decline. He missed HD due to feeling faint went to ED where he had HD and lots of fluid was removed. He has muscle aches/pains in the legs.        Hypertension/ESRD on HD    BP today is not at goal and less tapk988/90 grossly elevated patient has not taken his BP meds today once again. He reports he does not take his BP meds before doctor appts. Patients risk factors include: ESRD on HD is compliant w/ treatment  Patient is no checking home BP. Patient has not taken any meds today  Daily exercise and diet are as follows: reports healthy diet denies staying active  Weight gain of 6 lbs since the last visit   Takes the below meds regularly with no side effects.   Last LDL: 515 (2015)        Past Medical History   Diagnosis Date    Anemia in chronic kidney disease(285.21) 2013    Cardiomyopathy due to hypertension ([address] 2013     EF 20%    Edema of lower extremity 2013    End stage renal disease ([address] 2013     HD since 2003    Hyperphosphatemia 2013    Hypertension     Hypertension, uncontrolled 2013    Personal history of atrial flutter 2013    Pulmonary hypertension ([address] 2013    Recommendation refused by patient 2013     Patient refuses dialysis (hemodialysis or peritoneal dialysis)    Secondary hyperparathyroidism of renal origin ([address] 2013    Stroke ([address] x2 1/2015    Vitamin D insufficiency 4/25/2013     Vitamin D 25-Hydroxy 20.7      Past Surgical History   Procedure Laterality Date    Hx heart catheterization  01/2015    Hx csf shunt  09/2016     Social History     Social History    Marital status: SINGLE     Spouse name: N/A    Number of children: N/A    Years of education: N/A     Social History Main Topics    Smoking status: Never Smoker    Smokeless tobacco: Never Used    Alcohol use No    Drug use: No    Sexual activity: No     Other Topics Concern    None     Social History Narrative     Family History   Problem Relation Age of Onset    Hypertension Mother     Hypertension Father     Heart Attack Neg Hx     Stroke Neg Hx      Allergies   Allergen Reactions    Amlodipine Swelling    Nuts [Tree Nut] Swelling    Pcn [Penicillins] Unknown (comments)    Phenothiazines Other (comments)     Per father, \"He has a parkinsonian reaction to this medication. \"       Problem List:      Patient Active Problem List   Diagnosis Code    End stage renal disease ([facility] N18.6    New onset seizure ([facility] R56.9    Hypertension, uncontrolled I10    Impaired mobility and ADLs Z74.09    Personal history of atrial flutter Z86.79    Pulmonary hypertension ([facility] I27.2    Cardiomyopathy due to hypertension (HCC) I11.9, I42.9    Edema of lower extremity R60.0    Anemia in chronic kidney disease N18.9, D63.1    Secondary hyperparathyroidism of renal origin ([facility] N25.81    Vitamin D insufficiency E55.9    Generalized weakness R53.1    ARF (acute renal failure) ([facility]) A78.2    Metabolic acidosis M73.0    Uncontrolled hypertension I10    Accelerated hypertension I10    Hypertensive emergency I16.1    Advance care planning Z71.89    Renal failure N19    Anemia D64.9    Acute renal failure (HCC) N17.9    Syncope R55    Seizure (HCC) R56.9    Non compliance w medication regimen Z91.14       Medications:     Current Outpatient Prescriptions Medication Sig    oxyCODONE-acetaminophen (PERCOCET) 5-325 mg per tablet Take 1 Tab by mouth every four (4) hours as needed for Pain for up to 5 days. Max Daily Amount: 6 Tabs.  lisinopril (PRINIVIL, ZESTRIL) 40 mg tablet TAKE 1 TABLET BY MOUTH DAILY    levETIRAcetam (KEPPRA) 250 mg tablet TAKE 1 TABLET BY MOUTH EVERY Monday, Wednesday, Friday AFTER EACH DIALYSIS    levETIRAcetam (KEPPRA) 500 mg tablet TAKE 1 TABLET BY MOUTH DAILY    acetaminophen (TYLENOL) 500 mg tablet Take  by mouth every six (6) hours as needed for Pain.  simethicone (PHAZYME) 180 mg cap Take  by mouth.  hydrALAZINE (APRESOLINE) 100 mg tablet TAKE 1 TABLET BY MOUTH EVERY 8 HOURS    b complex-vitamin c-folic acid (NEPHROCAPS) 1 mg capsule Take 1 Cap by mouth daily. [Request refills from PCP (Dr. Javi Priest)]    calcium acetate (PHOSLO) 667 mg cap Take 1 Cap by mouth three (3) times daily (with meals).  cloNIDine HCl (CATAPRES) 0.3 mg tablet Take 1 Tab by mouth three (3) times daily.  isosorbide mononitrate ER (IMDUR) 60 mg CR tablet Take 1 Tab by mouth every morning.  metoprolol tartrate (LOPRESSOR) 100 mg IR tablet Take 2 Tabs by mouth every twelve (12) hours.  senna-docusate (PERICOLACE) 8.6-50 mg per tablet Take 1 Tab by mouth daily.  aspirin delayed-release 81 mg tablet Take 1 tablet by mouth daily.  pantoprazole (PROTONIX) 40 mg tablet Take 1 Tab by mouth Daily (before breakfast). [Request refills from PCP (Dr. Javi Priest)]    cholecalciferol (VITAMIN D3) 1,000 unit tablet Take 2 Tabs by mouth two (2) times a day. [Request refills from PCP (Dr. Javi Priest)]    oxyCODONE-acetaminophen (PERCOCET) 5-325 mg per tablet Take 1 Tab by mouth every eight (8) hours as needed. Max Daily Amount: 3 Tabs. No current facility-administered medications for this visit.           Constitutional: negative for fevers, chills, sweats and fatigue  Respiratory: negative for cough, sputum or wheezing  Cardiovascular: negative for chest pain, chest pressure/discomfort, dyspnea  Gastrointestinal: positive for nausea, vomiting, diarrhea negative for constipation and abdominal pain  Musculoskeletal: positive for myalgias and arthralgias  Neurological: negative for headaches and dizziness  Behavioral/Psych: negative for anxiety and depression    Physical Assessment:   VS:    Vitals:    02/14/17 0950   BP: (!) 236/114   Pulse: 74   Resp: 18   Temp: 96.7 °F (35.9 °C)   TempSrc: Oral   SpO2: 100%   Weight: 142 lb (64.4 kg)   Height: 5' 11\" (1.803 m)           Lab Results   Component Value Date/Time    Sodium 143 02/10/2017 10:55 PM    Potassium 4.5 02/10/2017 10:55 PM    Chloride 101 02/10/2017 10:55 PM    CO2 23 02/10/2017 10:55 PM    Anion gap 19 02/10/2017 10:55 PM    Glucose 228 02/10/2017 10:55 PM    BUN 66 02/10/2017 10:55 PM    Creatinine 7.02 02/10/2017 10:55 PM    BUN/Creatinine ratio 9 02/10/2017 10:55 PM    GFR est AA 10 02/10/2017 10:55 PM    GFR est non-AA 8 02/10/2017 10:55 PM    Calcium 9.1 02/10/2017 10:55 PM        General:   Well-groomed, well-nourished, in no distress, pleasant, alert, appropriate and conversant. Mouth:  Good dentition, oropharynx WNL without membranes, exudates, petechiae or ulcers  Cardiovasc:   RRR, no MRG. Pulses 2+ and symmetric at distal extremities. Pulmonary:   Lungs clear bilaterally. Normal respiratory effort. Abdomen:   Abdomen distended, soft, NT,NAB. Protruding belly button. Extremities:   No edema, no TTP bilateral calves. LEs warm and well-perfused. Neuro:   Alert and oriented, no focal deficits. No facial asymmetry noted. Skin:    No rashes or jaundice  MSK:   Normal ROM, 5/5 muscle strength  Psych:  No pressured speech or abnormal thought content        Assessment/Plan & Orders:       1. End stage renal disease ([facility]    2. Uncontrolled hypertension    3. Pain in both lower extremities    4.  Non compliance w medication regimen        Orders Placed This Encounter    oxyCODONE-acetaminophen (PERCOCET) 5-325 mg per tablet       Hypertension/ESRD BP is grossly uncontrolled. Will continue w/ current meds. BP grossly elevated patient has not taken meds today. Recommend he take meds and recheck BP if BP still elevated recommend he go to ED patient agreed w/ the plan.    Myalgia in the legs will give percocet 20 tabs for limited course    Follow up in 2-3 months    Mukesh Samaniego MD  Family Medicine  2/14/2017  10:04 AM

## 2017-02-23 ENCOUNTER — OFFICE VISIT (OUTPATIENT)
Dept: VASCULAR SURGERY | Age: 47
End: 2017-02-23

## 2017-02-23 VITALS
BODY MASS INDEX: 19.88 KG/M2 | DIASTOLIC BLOOD PRESSURE: 84 MMHG | WEIGHT: 142 LBS | SYSTOLIC BLOOD PRESSURE: 130 MMHG | HEIGHT: 71 IN | HEART RATE: 72 BPM | RESPIRATION RATE: 22 BRPM

## 2017-02-23 DIAGNOSIS — Z99.2 ESRD (END STAGE RENAL DISEASE) ON DIALYSIS (HCC): Primary | ICD-10-CM

## 2017-02-23 DIAGNOSIS — N18.6 ESRD (END STAGE RENAL DISEASE) ON DIALYSIS (HCC): Primary | ICD-10-CM

## 2017-02-23 NOTE — MR AVS SNAPSHOT
Visit Information Date & Time Provider Department Dept. Phone Encounter #  
 2/23/2017  1:15 PM Edmund Lidia, [address] [facility] [phone] [id number] Your Appointments 3/9/2017 10:00 AM  
PROCEDURE with [facility]  
[facility] (ROZINA SCHEDULING) Appt Note: LUE dialysis access. 2wks. T.K.  
 [address] [address]  
[phone] [address] [facility] [address]  
  
    
 3/14/2017  9:45 AM  
FOLLOW UP EXAM with Shaneka Devine MD  
[address]) Appt Note: 1 month f/u  
 [address] [facility] [address]  
[facility] [address]  
  
    
 3/24/2017  9:30 AM  
Office Visit with Shaneka Devine MD  
[address]) Appt Note: [address] [facility] [id number]  
[facility] [address]  
  
    
  
 3/9/2017  3:30 PM  
Any with Sarah Mendoza MD  
[facility] ([address] [place] [address]) Appt Note:  hospital FU penile lesion; LMOM advising pt of appt [address] [facility] [address]  
[address] [facility] [address] [facility] [address] [facility] [address] Upcoming Health Maintenance Date Due Pneumococcal 19-64 Highest Risk (1 of 3 - PCV13) 10/1/1988 DTaP/Tdap/Td series (1 - Tdap) 10/1/1990 Allergies as of 2/23/2017  Review Complete On: 2/23/2017 By: Raffi Diaz LPN Severity Noted Reaction Type Reactions Amlodipine  03/19/2015    Swelling Nuts [Tree Nut]  04/19/2013    Swelling Pcn [Penicillins]  05/29/2013    Unknown (comments) Phenothiazines  04/19/2013    Other (comments) Per father, Gustavo Dey has a parkinsonian reaction to this medication. \" Current Immunizations  Reviewed on 6/28/2015 Name Date  Influenza Vaccine (Quad) PF 9/20/2016  6:50 PM  
  
 Not reviewed this visit You Were Diagnosed With   
  
 Codes Comments ESRD (end stage renal disease) on dialysis ([facility]    -  Primary ICD-10-CM: N18.6, Z99.2 ICD-9-CM: 585.6, V45.11 Vitals BP  
  
  
  
  
  
 130/84 (BP 1 Location: Right arm, BP Patient Position: Sitting) Vitals History BMI and BSA Data Body Mass Index Body Surface Area  
 19.8 kg/m 2 1.8 m 2 Preferred Pharmacy Pharmacy Name Phone [facility] Samra Stanford [address] [place] [address] [phone] Your Updated Medication List  
  
   
This list is accurate as of: 2/23/17  2:25 PM.  Always use your most recent med list.  
  
  
  
  
 acetaminophen 500 mg tablet Commonly known as:  TYLENOL Take  by mouth every six (6) hours as needed for Pain. aspirin delayed-release 81 mg tablet Take 1 tablet by mouth daily. b complex-vitamin c-folic acid 1 mg capsule Commonly known as:  Elfrieda Theodore Take 1 Cap by mouth daily. [Request refills from PCP (Dr. Yuliana Priest)]  
  
 calcium acetate 667 mg Cap Commonly known as:  PHOSLO Take 1 Cap by mouth three (3) times daily (with meals). cholecalciferol 1,000 unit tablet Commonly known as:  VITAMIN D3 Take 2 Tabs by mouth two (2) times a day. [Request refills from PCP (Dr. Yuliana Priest)]  
  
 cloNIDine HCl 0.3 mg tablet Commonly known as:  CATAPRES Take 1 Tab by mouth three (3) times daily. hydrALAZINE 100 mg tablet Commonly known as:  APRESOLINE  
TAKE 1 TABLET BY MOUTH EVERY 8 HOURS  
  
 isosorbide mononitrate ER 60 mg CR tablet Commonly known as:  IMDUR Take 1 Tab by mouth every morning. * levETIRAcetam 250 mg tablet Commonly known as:  KEPPRA TAKE 1 TABLET BY MOUTH EVERY Monday, Wednesday, Friday AFTER EACH DIALYSIS  
  
 * levETIRAcetam 500 mg tablet Commonly known as:  KEPPRA TAKE 1 TABLET BY MOUTH DAILY  
  
 lisinopril 40 mg tablet Commonly known as:  PRINIVIL, ZESTRIL  
TAKE 1 TABLET BY MOUTH DAILY  
  
 metoprolol tartrate 100 mg IR tablet Commonly known as:  LOPRESSOR Take 2 Tabs by mouth every twelve (12) hours. oxyCODONE-acetaminophen 5-325 mg per tablet Commonly known as:  PERCOCET Take 1 Tab by mouth every eight (8) hours as needed. Max Daily Amount: 3 Tabs. pantoprazole 40 mg tablet Commonly known as:  PROTONIX Take 1 Tab by mouth Daily (before breakfast). [Request refills from PCP (Dr. Haroldo Priest)] PHAZYME 180 mg Cap Generic drug:  simethicone Take  by mouth. senna-docusate 8.6-50 mg per tablet Commonly known as:  Isaiah Mariana Take 1 Tab by mouth daily. * Notice: This list has 2 medication(s) that are the same as other medications prescribed for you. Read the directions carefully, and ask your doctor or other care provider to review them with you. To-Do List   
 03/09/2017 Imaging:  DUPLEX HEMODIALYSIS ACCESS LEFT AMB Please provide this summary of care documentation to your next provider. Your primary care clinician is listed as Lali Lee. If you have any questions after today's visit, please call [phone].

## 2017-02-23 NOTE — PROGRESS NOTES
Pt is here for what was scheduled to be a catheter removal   However, he had a recent infiltration of his new left arm avf and has had to resort back to using his catheter  Though there is ecchymosis still present, he said it had been more severe along with swelling, also with some resolution  Fistula has an excellent thrill close to the arterial anastamosis, but diminished where the bruising remains  Encouraged warm compresses, and get a repeat duplex in another 1-2 weeks after this settles down before reconsideration for use  I will call with those results    I did inquire about PD with his nephrologist at his request to reconsider PD  Unfortunately, due to the fact that he requires frequent pericentesis for ascites, he cannot do PD for risk of peritonitis  He will have to remain on hemo

## 2017-03-05 ENCOUNTER — HOSPITAL ENCOUNTER (EMERGENCY)
Age: 47
Discharge: HOME | End: 2017-03-05
Attending: EMERGENCY MEDICINE
Payer: MEDICARE

## 2017-03-05 VITALS
WEIGHT: 145 LBS | RESPIRATION RATE: 27 BRPM | SYSTOLIC BLOOD PRESSURE: 169 MMHG | DIASTOLIC BLOOD PRESSURE: 106 MMHG | BODY MASS INDEX: 20.22 KG/M2 | HEART RATE: 82 BPM | OXYGEN SATURATION: 96 % | TEMPERATURE: 97.4 F

## 2017-03-05 DIAGNOSIS — Z99.2 ESRD ON HEMODIALYSIS (HCC): ICD-10-CM

## 2017-03-05 DIAGNOSIS — R04.0 EPISTAXIS: Primary | ICD-10-CM

## 2017-03-05 DIAGNOSIS — N18.6 ESRD ON HEMODIALYSIS (HCC): ICD-10-CM

## 2017-03-05 DIAGNOSIS — I10 ESSENTIAL HYPERTENSION: ICD-10-CM

## 2017-03-05 LAB
ALBUMIN SERPL BCP-MCNC: 2.5 G/DL (ref 3.4–5)
ALBUMIN/GLOB SERPL: 0.6 {RATIO} (ref 0.8–1.7)
ALP SERPL-CCNC: 122 U/L (ref 45–117)
ALT SERPL-CCNC: 12 U/L (ref 16–61)
ANION GAP BLD CALC-SCNC: 11 MMOL/L (ref 3–18)
APTT PPP: 27.6 SEC (ref 23–36.4)
AST SERPL W P-5'-P-CCNC: 11 U/L (ref 15–37)
BASOPHILS # BLD AUTO: 0.1 K/UL (ref 0–0.06)
BASOPHILS # BLD: 1 % (ref 0–3)
BILIRUB SERPL-MCNC: 0.4 MG/DL (ref 0.2–1)
BUN SERPL-MCNC: 46 MG/DL (ref 7–18)
BUN/CREAT SERPL: 8 (ref 12–20)
CALCIUM SERPL-MCNC: 9.7 MG/DL (ref 8.5–10.1)
CHLORIDE SERPL-SCNC: 101 MMOL/L (ref 100–108)
CO2 SERPL-SCNC: 27 MMOL/L (ref 21–32)
CREAT SERPL-MCNC: 5.86 MG/DL (ref 0.6–1.3)
DIFFERENTIAL METHOD BLD: ABNORMAL
EOSINOPHIL # BLD: 0.6 K/UL (ref 0–0.4)
EOSINOPHIL NFR BLD: 6 % (ref 0–5)
ERYTHROCYTE [DISTWIDTH] IN BLOOD BY AUTOMATED COUNT: 20.2 % (ref 11.6–14.5)
GLOBULIN SER CALC-MCNC: 4.5 G/DL (ref 2–4)
GLUCOSE SERPL-MCNC: 83 MG/DL (ref 74–99)
HCT VFR BLD AUTO: 31.9 % (ref 36–48)
HGB BLD-MCNC: 9.8 G/DL (ref 13–16)
INR PPP: 1.3 (ref 0.8–1.2)
LYMPHOCYTES # BLD AUTO: 10 % (ref 20–51)
LYMPHOCYTES # BLD: 1 K/UL (ref 0.8–3.5)
MAGNESIUM SERPL-MCNC: 2.4 MG/DL (ref 1.8–2.4)
MCH RBC QN AUTO: 28.2 PG (ref 24–34)
MCHC RBC AUTO-ENTMCNC: 30.7 G/DL (ref 31–37)
MCV RBC AUTO: 91.9 FL (ref 74–97)
MONOCYTES # BLD: 1.9 K/UL (ref 0–1)
MONOCYTES NFR BLD AUTO: 20 % (ref 2–9)
NEUTS SEG # BLD: 6 K/UL (ref 1.8–8)
NEUTS SEG NFR BLD AUTO: 63 % (ref 42–75)
PLATELET # BLD AUTO: 182 K/UL (ref 135–420)
PLATELET COMMENTS,PCOM: ABNORMAL
POTASSIUM SERPL-SCNC: 4.2 MMOL/L (ref 3.5–5.5)
PROT SERPL-MCNC: 7 G/DL (ref 6.4–8.2)
PROTHROMBIN TIME: 15.7 SEC (ref 11.5–15.2)
RBC # BLD AUTO: 3.47 M/UL (ref 4.7–5.5)
RBC MORPH BLD: ABNORMAL
SODIUM SERPL-SCNC: 139 MMOL/L (ref 136–145)
WBC # BLD AUTO: 9.6 K/UL (ref 4.6–13.2)

## 2017-03-05 PROCEDURE — 99285 EMERGENCY DEPT VISIT HI MDM: CPT

## 2017-03-05 PROCEDURE — 85730 THROMBOPLASTIN TIME PARTIAL: CPT | Performed by: EMERGENCY MEDICINE

## 2017-03-05 PROCEDURE — 80053 COMPREHEN METABOLIC PANEL: CPT | Performed by: EMERGENCY MEDICINE

## 2017-03-05 PROCEDURE — 83735 ASSAY OF MAGNESIUM: CPT | Performed by: EMERGENCY MEDICINE

## 2017-03-05 PROCEDURE — 85610 PROTHROMBIN TIME: CPT | Performed by: EMERGENCY MEDICINE

## 2017-03-05 PROCEDURE — 74011250636 HC RX REV CODE- 250/636: Performed by: EMERGENCY MEDICINE

## 2017-03-05 PROCEDURE — 85025 COMPLETE CBC W/AUTO DIFF WBC: CPT | Performed by: EMERGENCY MEDICINE

## 2017-03-05 PROCEDURE — 96374 THER/PROPH/DIAG INJ IV PUSH: CPT

## 2017-03-05 PROCEDURE — 74011250637 HC RX REV CODE- 250/637: Performed by: EMERGENCY MEDICINE

## 2017-03-05 RX ORDER — CLONIDINE HYDROCHLORIDE 0.1 MG/1
0.3 TABLET ORAL
Status: COMPLETED | OUTPATIENT
Start: 2017-03-05 | End: 2017-03-05

## 2017-03-05 RX ORDER — HYDRALAZINE HYDROCHLORIDE 20 MG/ML
20 INJECTION INTRAMUSCULAR; INTRAVENOUS ONCE
Status: COMPLETED | OUTPATIENT
Start: 2017-03-05 | End: 2017-03-05

## 2017-03-05 RX ADMIN — CLONIDINE HYDROCHLORIDE 0.3 MG: 0.1 TABLET ORAL at 05:03

## 2017-03-05 RX ADMIN — HYDRALAZINE HYDROCHLORIDE 20 MG: 20 INJECTION INTRAMUSCULAR; INTRAVENOUS at 05:03

## 2017-03-05 NOTE — ED NOTES
Pt resting on stretcher with eyes open at this time NAD noted, no new complaints voiced. Will continue to monitor.

## 2017-03-05 NOTE — ED PROVIDER NOTES
HPI Comments: 2:42 AM Aden Bustillos is a 51 y.o. male who presents to the ED c/o L sided epistaxis that onset at 7 PM yesterday. Patient is a MWF dialysis patient, and notes that he had a full run on Friday, 2 days ago, with no complications. Reports that he does receive Heparin while at dialysis and has been admitted at [facility] for fluid overload until yesterday where he was receiving subQ heparin for DVT PPX. Denies trauma to the nose, lightheadedness or syncope. Denies chest pain, SOB, and N/V. Relatives state that the patient is currently in subacute rehab since he was discharged from [facility]. Admits to the use of 81 mg ASA, no other oral anticoagulants. No further complaints at this time. The history is provided by the patient and a relative. Past Medical History:   Diagnosis Date    Anemia in chronic kidney disease(285.21) 4/24/2013    Cardiomyopathy due to hypertension ([address] 4/24/2013    EF 20%    Edema of lower extremity 4/24/2013    End stage renal disease ([address] 04/19/2013    HD since 9/2003    Hyperphosphatemia 4/24/2013    Hypertension     Hypertension, uncontrolled 4/24/2013    Personal history of atrial flutter 4/24/2013    Pulmonary hypertension ([address] 4/24/2013    Recommendation refused by patient 4/24/2013    Patient refuses dialysis (hemodialysis or peritoneal dialysis)    Secondary hyperparathyroidism of renal origin ([address] 4/24/2013    Stroke ([address]     x2 1/2015    Vitamin D insufficiency 4/25/2013    Vitamin D 25-Hydroxy 20.7        Past Surgical History:   Procedure Laterality Date    HX CSF SHUNT  09/2016    HX HEART CATHETERIZATION  01/2015         Family History:   Problem Relation Age of Onset    Hypertension Mother     Hypertension Father     Heart Attack Neg Hx     Stroke Neg Hx        Social History     Social History    Marital status: SINGLE     Spouse name: N/A    Number of children: N/A    Years of education: N/A     Occupational History    Not on file. Social History Main Topics    Smoking status: Never Smoker    Smokeless tobacco: Never Used    Alcohol use No    Drug use: No    Sexual activity: No     Other Topics Concern    Not on file     Social History Narrative         ALLERGIES: Amlodipine; Nuts [tree nut]; Pcn [penicillins]; and Phenothiazines    Review of Systems   Constitutional: Negative. Negative for chills and fever. HENT: Positive for nosebleeds. Negative for congestion. Eyes: Negative. Negative for visual disturbance. Respiratory: Negative. Negative for chest tightness and shortness of breath. Cardiovascular: Negative. Negative for chest pain and leg swelling. Gastrointestinal: Negative. Negative for abdominal pain, diarrhea, nausea and vomiting. Genitourinary: Negative. Negative for difficulty urinating and dysuria. Musculoskeletal: Negative. Negative for back pain and myalgias. Skin: Negative. Negative for rash and wound. Neurological: Negative. Negative for dizziness, speech difficulty, weakness and light-headedness. Psychiatric/Behavioral: Negative. Negative for self-injury. All other systems reviewed and are negative. Vitals:    03/05/17 0216 03/05/17 0400   BP: (!) 222/120 (!) 185/101   Pulse: 70 80   Resp: 17 22   Temp: 97.4 °F (36.3 °C)    SpO2: 96%    Weight: 65.8 kg (145 lb)             Physical Exam   Constitutional: He is oriented to person, place, and time. He appears well-developed and well-nourished. No distress. HENT:   Head: Normocephalic and atraumatic. Mouth/Throat: Oropharynx is clear and moist.   Dark red blood coming from the L nare. No pulsatile bleeding   Eyes: Conjunctivae and EOM are normal. Pupils are equal, round, and reactive to light. No scleral icterus. Neck: Normal range of motion. Neck supple. No JVD present. No thyromegaly present. Cardiovascular: Normal rate, regular rhythm, S1 normal and S2 normal.  Exam reveals no gallop and no friction rub.     No murmur heard.  Pulmonary/Chest: Effort normal and breath sounds normal. No accessory muscle usage. No respiratory distress. Tunnel catheter in the L upper chest    Abdominal: Soft. Normal appearance. He exhibits distension (mild to moderate). There is no rigidity, no rebound and no guarding. No TTP   Musculoskeletal: Normal range of motion. He exhibits no edema or tenderness. AV fistula in the L upper extremity  1+ pitting edema in the lower extremities bilaterally        Neurological: He is alert and oriented to person, place, and time. He has normal strength. No cranial nerve deficit or sensory deficit. Coordination normal.   Skin: Skin is warm and intact. No rash noted. Psychiatric: He has a normal mood and affect. His speech is normal and behavior is normal.   Vitals reviewed. MDM  Number of Diagnoses or Management Options  Epistaxis:   ESRD on hemodialysis [facility]):   Essential hypertension:   Diagnosis management comments: Aden Bustillos is a 51 y.o. Male coming in with a nosebleed from the left nare. BP elevated, will given nighttime BP meds. Epistaxis resolved with direct pressure and INR 1.3 with a normal PTT. Hbg stable. No hemodynamic signs of significant blood loss. Patient counseled on not blowing the nose or disrupting clot formation and will be discharged back to subacute rehab to follow up as an outpatient. ED Course       Procedures    Vitals:  Patient Vitals for the past 12 hrs:   Temp Pulse Resp BP SpO2   03/05/17 0400 - 80 22 (!) 185/101 -   03/05/17 0216 97.4 °F (36.3 °C) 70 17 (!) 222/120 96 %     96% on RA, indicating adequate oxygenation.     Medications ordered:   Medications   hydrALAZINE (APRESOLINE) 20 mg/mL injection 20 mg (20 mg IntraVENous Given 3/5/17 0503)   cloNIDine HCl (CATAPRES) tablet 0.3 mg (0.3 mg Oral Given 3/5/17 0503)         Lab findings:  Recent Results (from the past 12 hour(s))   PROTHROMBIN TIME + INR    Collection Time: 03/05/17  3:49 AM   Result Value Ref Range    Prothrombin time 15.7 (H) 11.5 - 15.2 sec    INR 1.3 (H) 0.8 - 1.2     PTT    Collection Time: 03/05/17  3:49 AM   Result Value Ref Range    aPTT 27.6 23.0 - 36.4 SEC   CBC WITH AUTOMATED DIFF    Collection Time: 03/05/17  4:11 AM   Result Value Ref Range    WBC 9.6 4.6 - 13.2 K/uL    RBC 3.47 (L) 4.70 - 5.50 M/uL    HGB 9.8 (L) 13.0 - 16.0 g/dL    HCT 31.9 (L) 36.0 - 48.0 %    MCV 91.9 74.0 - 97.0 FL    MCH 28.2 24.0 - 34.0 PG    MCHC 30.7 (L) 31.0 - 37.0 g/dL    RDW 20.2 (H) 11.6 - 14.5 %    PLATELET 661 905 - 838 K/uL    NEUTROPHILS PENDING %    LYMPHOCYTES PENDING %    MONOCYTES PENDING %    EOSINOPHILS PENDING %    BASOPHILS PENDING %    ABS. NEUTROPHILS PENDING K/UL    ABS. LYMPHOCYTES PENDING K/UL    ABS. MONOCYTES PENDING K/UL    ABS. EOSINOPHILS PENDING K/UL    ABS. BASOPHILS PENDING K/UL    DF PENDING    METABOLIC PANEL, COMPREHENSIVE    Collection Time: 03/05/17  4:11 AM   Result Value Ref Range    Sodium 139 136 - 145 mmol/L    Potassium 4.2 3.5 - 5.5 mmol/L    Chloride 101 100 - 108 mmol/L    CO2 27 21 - 32 mmol/L    Anion gap 11 3.0 - 18 mmol/L    Glucose 83 74 - 99 mg/dL    BUN 46 (H) 7.0 - 18 MG/DL    Creatinine 5.86 (H) 0.6 - 1.3 MG/DL    BUN/Creatinine ratio 8 (L) 12 - 20      GFR est AA 13 (L) >60 ml/min/1.73m2    GFR est non-AA 10 (L) >60 ml/min/1.73m2    Calcium 9.7 8.5 - 10.1 MG/DL    Bilirubin, total 0.4 0.2 - 1.0 MG/DL    ALT (SGPT) 12 (L) 16 - 61 U/L    AST (SGOT) 11 (L) 15 - 37 U/L    Alk. phosphatase 122 (H) 45 - 117 U/L    Protein, total 7.0 6.4 - 8.2 g/dL    Albumin 2.5 (L) 3.4 - 5.0 g/dL    Globulin 4.5 (H) 2.0 - 4.0 g/dL    A-G Ratio 0.6 (L) 0.8 - 1.7     MAGNESIUM    Collection Time: 03/05/17  4:11 AM   Result Value Ref Range    Magnesium 2.4 1.8 - 2.4 mg/dL     Reevaluation of patient:   4:59 AM  I have reassessed the patient. Patient was discharged in stable condition. Patient is to return to emergency department if any new or worsening condition. Disposition:  Diagnosis:   1. Epistaxis    2. Essential hypertension    3. ESRD on hemodialysis [facility])        Disposition: Discharge    Follow-up Information     Follow up With Details Karri Mora MD Call in 1 day For ED visit follow up, For further evaluation [address] [facility] [place] [address] [id number]  [phone]      [facility] EMERGENCY DEPT  As needed, If symptoms worsen [address] [place] [address]  [phone]           Patient's Medications   Start Taking    No medications on file   Continue Taking    ACETAMINOPHEN (TYLENOL) 500 MG TABLET    Take  by mouth every six (6) hours as needed for Pain. ASPIRIN DELAYED-RELEASE 81 MG TABLET    Take 1 tablet by mouth daily. B COMPLEX-VITAMIN C-FOLIC ACID (NEPHROCAPS) 1 MG CAPSULE    Take 1 Cap by mouth daily. [Request refills from PCP (Dr. Dipika Priest)]    CALCIUM ACETATE (PHOSLO) 667 MG CAP    Take 1 Cap by mouth three (3) times daily (with meals). CHOLECALCIFEROL (VITAMIN D3) 1,000 UNIT TABLET    Take 2 Tabs by mouth two (2) times a day. [Request refills from PCP (Dr. Dipika Priest)]    CLONIDINE HCL (CATAPRES) 0.3 MG TABLET    Take 1 Tab by mouth three (3) times daily. HYDRALAZINE (APRESOLINE) 100 MG TABLET    TAKE 1 TABLET BY MOUTH EVERY 8 HOURS    ISOSORBIDE MONONITRATE ER (IMDUR) 60 MG CR TABLET    Take 1 Tab by mouth every morning. LEVETIRACETAM (KEPPRA) 250 MG TABLET    TAKE 1 TABLET BY MOUTH EVERY Monday, Wednesday, Friday AFTER EACH DIALYSIS    LEVETIRACETAM (KEPPRA) 500 MG TABLET    TAKE 1 TABLET BY MOUTH DAILY    LISINOPRIL (PRINIVIL, ZESTRIL) 40 MG TABLET    TAKE 1 TABLET BY MOUTH DAILY    METOPROLOL TARTRATE (LOPRESSOR) 100 MG IR TABLET    Take 2 Tabs by mouth every twelve (12) hours. OXYCODONE-ACETAMINOPHEN (PERCOCET) 5-325 MG PER TABLET    Take 1 Tab by mouth every eight (8) hours as needed. Max Daily Amount: 3 Tabs.     PANTOPRAZOLE (PROTONIX) 40 MG TABLET Take 1 Tab by mouth Daily (before breakfast). [Request refills from PCP (Dr. Mary Priest)]    SENNA-DOCUSATE (PERICOLACE) 8.6-50 MG PER TABLET    Take 1 Tab by mouth daily. SIMETHICONE (PHAZYME) 180 MG CAP    Take  by mouth. These Medications have changed    No medications on file   Stop Taking    No medications on file       SCRIBE ATTESTATION STATEMENT  Documented by: Lena aikenibing for, and in the presence of, Jayce Sargent MD 2:49 AM     Signed by: Wayne Gomez, 3/5/2017, 2:49 AM    PROVIDER ATTESTATION STATEMENT  I personally performed the services described in the documentation, reviewed the documentation, as recorded by the scribe in my presence, and it accurately and completely records my words and actions.   Jayce Sargent MD

## 2017-03-05 NOTE — ED TRIAGE NOTES
Pt brought in via medic from [address] [place] [address]. Pt has had a nosebleed all day. No bleeding at this time. Pt instructed not to sneeze sniffle snort or wipe his nose. Pt voiced understanding.

## 2017-03-05 NOTE — DISCHARGE INSTRUCTIONS
DASH Diet: Care Instructions  Your Care Instructions  The DASH diet is an eating plan that can help lower your blood pressure. DASH stands for Dietary Approaches to Stop Hypertension. Hypertension is high blood pressure. The DASH diet focuses on eating foods that are high in calcium, potassium, and magnesium. These nutrients can lower blood pressure. The foods that are highest in these nutrients are fruits, vegetables, low-fat dairy products, nuts, seeds, and legumes. But taking calcium, potassium, and magnesium supplements instead of eating foods that are high in those nutrients does not have the same effect. The DASH diet also includes whole grains, fish, and poultry. The DASH diet is one of several lifestyle changes your doctor may recommend to lower your high blood pressure. Your doctor may also want you to decrease the amount of sodium in your diet. Lowering sodium while following the DASH diet can lower blood pressure even further than just the DASH diet alone. Follow-up care is a key part of your treatment and safety. Be sure to make and go to all appointments, and call your doctor if you are having problems. It's also a good idea to know your test results and keep a list of the medicines you take. How can you care for yourself at home? Following the DASH diet  · Eat 4 to 5 servings of fruit each day. A serving is 1 medium-sized piece of fruit, ½ cup chopped or canned fruit, 1/4 cup dried fruit, or 4 ounces (½ cup) of fruit juice. Choose fruit more often than fruit juice. · Eat 4 to 5 servings of vegetables each day. A serving is 1 cup of lettuce or raw leafy vegetables, ½ cup of chopped or cooked vegetables, or 4 ounces (½ cup) of vegetable juice. Choose vegetables more often than vegetable juice. · Get 2 to 3 servings of low-fat and fat-free dairy each day. A serving is 8 ounces of milk, 1 cup of yogurt, or 1 ½ ounces of cheese. · Eat 6 to 8 servings of grains each day.  A serving is 1 slice of bread, 1 ounce of dry cereal, or ½ cup of cooked rice, pasta, or cooked cereal. Try to choose whole-grain products as much as possible. · Limit lean meat, poultry, and fish to 2 servings each day. A serving is 3 ounces, about the size of a deck of cards. · Eat 4 to 5 servings of nuts, seeds, and legumes (cooked dried beans, lentils, and split peas) each week. A serving is 1/3 cup of nuts, 2 tablespoons of seeds, or ½ cup of cooked beans or peas. · Limit fats and oils to 2 to 3 servings each day. A serving is 1 teaspoon of vegetable oil or 2 tablespoons of salad dressing. · Limit sweets and added sugars to 5 servings or less a week. A serving is 1 tablespoon jelly or jam, ½ cup sorbet, or 1 cup of lemonade. · Eat less than 2,300 milligrams (mg) of sodium a day. If you limit your sodium to 1,500 mg a day, you can lower your blood pressure even more. Tips for success  · Start small. Do not try to make dramatic changes to your diet all at once. You might feel that you are missing out on your favorite foods and then be more likely to not follow the plan. Make small changes, and stick with them. Once those changes become habit, add a few more changes. · Try some of the following:  ¨ Make it a goal to eat a fruit or vegetable at every meal and at snacks. This will make it easy to get the recommended amount of fruits and vegetables each day. ¨ Try yogurt topped with fruit and nuts for a snack or healthy dessert. ¨ Add lettuce, tomato, cucumber, and onion to sandwiches. ¨ Combine a ready-made pizza crust with low-fat mozzarella cheese and lots of vegetable toppings. Try using tomatoes, squash, spinach, broccoli, carrots, cauliflower, and onions. ¨ Have a variety of cut-up vegetables with a low-fat dip as an appetizer instead of chips and dip. ¨ Sprinkle sunflower seeds or chopped almonds over salads. Or try adding chopped walnuts or almonds to cooked vegetables. ¨ Try some vegetarian meals using beans and peas. Add garbanzo or kidney beans to salads. Make burritos and tacos with mashed sales beans or black beans. Where can you learn more? Go to http://randa-pavel.info/. Enter G209 in the search box to learn more about \"DASH Diet: Care Instructions. \"  Current as of: March 23, 2016  Content Version: 11.1  © 1328-6176 [employer]. Care instructions adapted under license by [employer] (which disclaims liability or warranty for this information). If you have questions about a medical condition or this instruction, always ask your healthcare professional. [place] [address] any warranty or liability for your use of this information. High Blood Pressure: Care Instructions  Your Care Instructions  If your blood pressure is usually above 140/90, you have high blood pressure, or hypertension. That means the top number is 140 or higher or the bottom number is 90 or higher, or both. Despite what a lot of people think, high blood pressure usually doesn't cause headaches or make you feel dizzy or lightheaded. It usually has no symptoms. But it does increase your risk for heart attack, stroke, and kidney or eye damage. The higher your blood pressure, the more your risk increases. Your doctor will give you a goal for your blood pressure. Your goal will be based on your health and your age. An example of a goal is to keep your blood pressure below 140/90. Lifestyle changes, such as eating healthy and being active, are always important to help lower blood pressure. You might also take medicine to reach your blood pressure goal.  Follow-up care is a key part of your treatment and safety. Be sure to make and go to all appointments, and call your doctor if you are having problems. It's also a good idea to know your test results and keep a list of the medicines you take. How can you care for yourself at home?   Medical treatment  · If you stop taking your medicine, your blood pressure will go back up. You may take one or more types of medicine to lower your blood pressure. Be safe with medicines. Take your medicine exactly as prescribed. Call your doctor if you think you are having a problem with your medicine. · Talk to your doctor before you start taking aspirin every day. Aspirin can help certain people lower their risk of a heart attack or stroke. But taking aspirin isn't right for everyone, because it can cause serious bleeding. · See your doctor regularly. You may need to see the doctor more often at first or until your blood pressure comes down. · If you are taking blood pressure medicine, talk to your doctor before you take decongestants or anti-inflammatory medicine, such as ibuprofen. Some of these medicines can raise blood pressure. · Learn how to check your blood pressure at home. Lifestyle changes  · Stay at a healthy weight. This is especially important if you put on weight around the waist. Losing even 10 pounds can help you lower your blood pressure. · If your doctor recommends it, get more exercise. Walking is a good choice. Bit by bit, increase the amount you walk every day. Try for at least 30 minutes on most days of the week. You also may want to swim, bike, or do other activities. · Avoid or limit alcohol. Talk to your doctor about whether you can drink any alcohol. · Try to limit how much sodium you eat to less than 2,300 milligrams (mg) a day. Your doctor may ask you to try to eat less than 1,500 mg a day. · Eat plenty of fruits (such as bananas and oranges), vegetables, legumes, whole grains, and low-fat dairy products. · Lower the amount of saturated fat in your diet. Saturated fat is found in animal products such as milk, cheese, and meat. Limiting these foods may help you lose weight and also lower your risk for heart disease. · Do not smoke. Smoking increases your risk for heart attack and stroke.  If you need help quitting, talk to your doctor about stop-smoking programs and medicines. These can increase your chances of quitting for good. When should you call for help? Call 911 anytime you think you may need emergency care. This may mean having symptoms that suggest that your blood pressure is causing a serious heart or blood vessel problem. Your blood pressure may be over 180/110. For example, call 911 if:  · You have symptoms of a heart attack. These may include:  ¨ Chest pain or pressure, or a strange feeling in the chest.  ¨ Sweating. ¨ Shortness of breath. ¨ Nausea or vomiting. ¨ Pain, pressure, or a strange feeling in the back, neck, jaw, or upper belly or in one or both shoulders or arms. ¨ Lightheadedness or sudden weakness. ¨ A fast or irregular heartbeat. · You have symptoms of a stroke. These may include:  ¨ Sudden numbness, tingling, weakness, or loss of movement in your face, arm, or leg, especially on only one side of your body. ¨ Sudden vision changes. ¨ Sudden trouble speaking. ¨ Sudden confusion or trouble understanding simple statements. ¨ Sudden problems with walking or balance. ¨ A sudden, severe headache that is different from past headaches. · You have severe back or belly pain. Do not wait until your blood pressure comes down on its own. Get help right away. Call your doctor now or seek immediate care if:  · Your blood pressure is much higher than normal (such as 180/110 or higher), but you don't have symptoms. · You think high blood pressure is causing symptoms, such as:  ¨ Severe headache. ¨ Blurry vision. Watch closely for changes in your health, and be sure to contact your doctor if:  · Your blood pressure measures 140/90 or higher at least 2 times. That means the top number is 140 or higher or the bottom number is 90 or higher, or both. · You think you may be having side effects from your blood pressure medicine. · Your blood pressure is usually normal, but it goes above normal at least 2 times.   Where can you learn more? Go to http://randa-pavel.info/. Enter D731 in the search box to learn more about \"High Blood Pressure: Care Instructions. \"  Current as of: August 8, 2016  Content Version: 11.1  © 5780-3974 [employer]. Care instructions adapted under license by [employer] (which disclaims liability or warranty for this information). If you have questions about a medical condition or this instruction, always ask your healthcare professional. [place] [address] any warranty or liability for your use of this information.

## 2017-03-31 ENCOUNTER — OFFICE VISIT (OUTPATIENT)
Dept: FAMILY MEDICINE CLINIC | Age: 47
End: 2017-03-31

## 2017-03-31 VITALS
HEART RATE: 74 BPM | HEIGHT: 71 IN | WEIGHT: 155 LBS | DIASTOLIC BLOOD PRESSURE: 100 MMHG | TEMPERATURE: 97.8 F | RESPIRATION RATE: 20 BRPM | BODY MASS INDEX: 21.7 KG/M2 | OXYGEN SATURATION: 100 % | SYSTOLIC BLOOD PRESSURE: 170 MMHG

## 2017-03-31 DIAGNOSIS — M1A.9XX0 CHRONIC GOUT, UNSPECIFIED CAUSE, UNSPECIFIED SITE: ICD-10-CM

## 2017-03-31 DIAGNOSIS — I11.0 CARDIOMYOPATHY DUE TO HYPERTENSION, WITH HEART FAILURE (HCC): Primary | Chronic | ICD-10-CM

## 2017-03-31 DIAGNOSIS — I43 CARDIOMYOPATHY DUE TO HYPERTENSION, WITH HEART FAILURE (HCC): Primary | Chronic | ICD-10-CM

## 2017-03-31 DIAGNOSIS — I10 HYPERTENSION, UNCONTROLLED: Chronic | ICD-10-CM

## 2017-03-31 RX ORDER — CARVEDILOL 25 MG/1
25 TABLET ORAL 2 TIMES DAILY WITH MEALS
Qty: 60 TAB | Refills: 3 | Status: SHIPPED | OUTPATIENT
Start: 2017-03-31 | End: 2017-03-31 | Stop reason: SDUPTHER

## 2017-03-31 RX ORDER — CARVEDILOL 25 MG/1
25 TABLET ORAL 2 TIMES DAILY WITH MEALS
COMMUNITY
End: 2017-03-31 | Stop reason: SDUPTHER

## 2017-03-31 RX ORDER — ALLOPURINOL 100 MG/1
TABLET ORAL
Qty: 180 TAB | Refills: 3 | Status: SHIPPED | OUTPATIENT
Start: 2017-03-31 | End: 2018-03-13 | Stop reason: SDUPTHER

## 2017-03-31 RX ORDER — NIFEDIPINE 30 MG/1
TABLET, EXTENDED RELEASE ORAL DAILY
COMMUNITY
End: 2017-03-31 | Stop reason: SDUPTHER

## 2017-03-31 RX ORDER — NIFEDIPINE 30 MG/1
30 TABLET, EXTENDED RELEASE ORAL DAILY
Qty: 30 TAB | Refills: 3 | Status: SHIPPED | OUTPATIENT
Start: 2017-03-31 | End: 2017-03-31 | Stop reason: SDUPTHER

## 2017-03-31 RX ORDER — NIFEDIPINE 30 MG/1
TABLET, EXTENDED RELEASE ORAL
Qty: 90 TAB | Refills: 3 | Status: SHIPPED | OUTPATIENT
Start: 2017-03-31 | End: 2017-04-10 | Stop reason: SDUPTHER

## 2017-03-31 RX ORDER — ALLOPURINOL 100 MG/1
100 TABLET ORAL 2 TIMES DAILY
Qty: 60 TAB | Refills: 3 | Status: SHIPPED | OUTPATIENT
Start: 2017-03-31 | End: 2017-03-31 | Stop reason: SDUPTHER

## 2017-03-31 RX ORDER — SEVELAMER CARBONATE 800 MG/1
TABLET, FILM COATED ORAL 3 TIMES DAILY
COMMUNITY
End: 2019-02-14

## 2017-03-31 RX ORDER — CARVEDILOL 25 MG/1
TABLET ORAL
Qty: 180 TAB | Refills: 3 | Status: SHIPPED | OUTPATIENT
Start: 2017-03-31 | End: 2019-01-01 | Stop reason: SDUPTHER

## 2017-03-31 RX ORDER — ALLOPURINOL 100 MG/1
TABLET ORAL 2 TIMES DAILY
COMMUNITY
End: 2017-03-31 | Stop reason: SDUPTHER

## 2017-03-31 NOTE — MR AVS SNAPSHOT
Visit Information Date & Time Provider Department Dept. Phone Encounter #  
 3/31/2017  9:00 AM Cinthya Feng MD [facility] [phone] [id number] Follow-up Instructions Return in about 1 year (around 3/31/2018). Your Appointments 4/10/2017  9:45 AM  
Office Visit with King Shanique MD  
[facility]) Appt Note: [facility] htn [address] [facility], [address] [facility] [address]  
[address], [address] [facility]  
  
    
 4/28/2017  9:30 AM  
FOLLOW UP EXAM with Cinthya Feng MD  
[facility]) Appt Note: 1 month f/u  
 [address] [facility] [address]  
[facility] [id number] 5/11/2017 11:30 AM  
Office Visit with Cinthya Feng MD  
[facility]) Appt Note: [address] [facility] [id number]  
[facility] [id number] Upcoming Health Maintenance Date Due Pneumococcal 19-64 Highest Risk (1 of 3 - PCV13) 10/1/1988 DTaP/Tdap/Td series (1 - Tdap) 10/1/1990 Allergies as of 3/31/2017  Review Complete On: 3/31/2017 By: Cinthya Feng MD  
  
 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 \" Current Immunizations  Reviewed on 6/28/2015 Name Date Influenza Vaccine (Quad) PF 9/20/2016  6:50 PM  
  
 Not reviewed this visit You Were Diagnosed With   
  
 Codes Comments Cardiomyopathy due to hypertension, with heart failure ([facility] [address]    -  Primary ICD-10-CM: I11.0 ICD-9-CM: 402.91  Hypertension, uncontrolled     ICD-10-CM: I10 
 ICD-9-CM: 401.9 Chronic gout, unspecified cause, unspecified site     ICD-10-CM: M1A. 9XX0 
ICD-9-CM: 274.02 Vitals BP Pulse Temp Resp Height(growth percentile) Weight(growth percentile) (!) 170/100 74 97.8 °F (36.6 °C) (Oral) 20 5' 11\" (1.803 m) 155 lb (70.3 kg) SpO2 BMI Smoking Status 100% 21.62 kg/m2 Never Smoker Vitals History BMI and BSA Data Body Mass Index Body Surface Area  
 21.62 kg/m 2 1.88 m 2 Preferred Pharmacy Pharmacy Name Phone [facility] [address] [place] [address] [phone] Your Updated Medication List  
  
   
This list is accurate as of: 3/31/17 10:42 AM.  Always use your most recent med list.  
  
  
  
  
 acetaminophen 500 mg tablet Commonly known as:  TYLENOL Take  by mouth every six (6) hours as needed for Pain. allopurinol 100 mg tablet Commonly known as:  Catia Gurmeet Take 1 Tab by mouth two (2) times a day. aspirin delayed-release 81 mg tablet Take 1 tablet by mouth daily. b complex-vitamin c-folic acid 1 mg capsule Commonly known as:  Tyson Dumont Take 1 Cap by mouth daily. [Request refills from PCP (Dr. Vidal Priest)] BACTROBAN NASAL 2 % nasal ointment Generic drug:  mupirocin calcium  
by Both Nostrils route two (2) times a day. calcium acetate 667 mg Cap Commonly known as:  PHOSLO Take 1 Cap by mouth three (3) times daily (with meals). carvedilol 25 mg tablet Commonly known as:  Darletta Maiers Take 1 Tab by mouth two (2) times daily (with meals). cholecalciferol 1,000 unit tablet Commonly known as:  VITAMIN D3 Take 2 Tabs by mouth two (2) times a day. [Request refills from PCP (Dr. Vidal Priest)]  
  
 cloNIDine HCl 0.3 mg tablet Commonly known as:  CATAPRES Take 1 Tab by mouth three (3) times daily. hydrALAZINE 100 mg tablet Commonly known as:  APRESOLINE  
 TAKE 1 TABLET BY MOUTH EVERY 8 HOURS  
  
 isosorbide mononitrate ER 60 mg CR tablet Commonly known as:  IMDUR Take 1 Tab by mouth every morning. * levETIRAcetam 250 mg tablet Commonly known as:  KEPPRA TAKE 1 TABLET BY MOUTH EVERY Monday, Wednesday, Friday AFTER EACH DIALYSIS  
  
 * levETIRAcetam 500 mg tablet Commonly known as:  KEPPRA TAKE 1 TABLET BY MOUTH DAILY  
  
 lisinopril 40 mg tablet Commonly known as:  PRINIVIL, ZESTRIL  
TAKE 1 TABLET BY MOUTH DAILY  
  
 metoprolol tartrate 100 mg IR tablet Commonly known as:  LOPRESSOR Take 2 Tabs by mouth every twelve (12) hours. NIFEdipine ER 30 mg ER tablet Commonly known as:  PROCARDIA XL Take 1 Tab by mouth daily. oxyCODONE-acetaminophen 5-325 mg per tablet Commonly known as:  PERCOCET Take 1 Tab by mouth every eight (8) hours as needed. Max Daily Amount: 3 Tabs. pantoprazole 40 mg tablet Commonly known as:  PROTONIX Take 1 Tab by mouth Daily (before breakfast). [Request refills from PCP (Dr. Suman Priest)] PHAZYME 180 mg Cap Generic drug:  simethicone Take  by mouth. PRO-STAT 64 PO Take  by mouth. RENVELA 800 mg Tab tab Generic drug:  sevelamer carbonate Take  by mouth three (3) times daily. senna-docusate 8.6-50 mg per tablet Commonly known as:  Hershell Goodness Take 1 Tab by mouth daily. * Notice: This list has 2 medication(s) that are the same as other medications prescribed for you. Read the directions carefully, and ask your doctor or other care provider to review them with you. Prescriptions Sent to Pharmacy Refills  
 allopurinol (ZYLOPRIM) 100 mg tablet 3 Sig: Take 1 Tab by mouth two (2) times a day. Class: Normal  
 Pharmacy: [facility] [address] [place] [address] Samra [address] [place] [address] Ph #: [phone]  Route: Oral  
 carvedilol (COREG) 25 mg tablet 3  
 Sig: Take 1 Tab by mouth two (2) times daily (with meals). Class: Normal  
 [facility] [address] [facility] [address] [place] [address] Ph #: [phone] Route: Oral  
 b complex-vitamin c-folic acid (NEPHROCAPS) 1 mg capsule 1 Sig: Take 1 Cap by mouth daily. [Request refills from PCP (Dr. Fidel Priest)] Class: Normal  
 [address] [place] [address] Ph #: [phone] Route: Oral  
 NIFEdipine ER (PROCARDIA XL) 30 mg ER tablet 3 Sig: Take 1 Tab by mouth daily. Class: Normal  
 [facility] [place] [address] Ph #: [phone] Route: Oral  
  
Follow-up Instructions Return in about 1 year (around 3/31/2018). Patient Instructions Low Sodium Diet (2,000 Milligram): Care Instructions Your Care Instructions Too much sodium causes your body to hold on to extra water. This can raise your blood pressure and force your heart and kidneys to work harder. In very serious cases, this could cause you to be put in the hospital. It might even be life-threatening. By limiting sodium, you will feel better and lower your risk of serious problems. The most common source of sodium is salt. People get most of the salt in their diet from canned, prepared, and packaged foods. Fast food and restaurant meals also are very high in sodium. Your doctor will probably limit your sodium to less than 2,000 milligrams (mg) a day. This limit counts all the sodium in prepared and packaged foods and any salt you add to your food. Follow-up care is a key part of your treatment and safety. Be sure to make and go to all appointments, and call your doctor if you are having problems. It's also a good idea to know your test results and keep a list of the medicines you take. How can you care for yourself at home? Read food labels · Read labels on cans and food packages. The labels tell you how much sodium is in each serving. Make sure that you look at the serving size. If you eat more than the serving size, you have eaten more sodium. · Food labels also tell you the Percent Daily Value for sodium. Choose products with low Percent Daily Values for sodium. · Be aware that sodium can come in forms other than salt, including monosodium glutamate (MSG), sodium citrate, and sodium bicarbonate (baking soda). MSG is often added to Asian food. When you eat out, you can sometimes ask for food without MSG or added salt. Buy low-sodium foods · Buy foods that are labeled \"unsalted\" (no salt added), \"sodium-free\" (less than 5 mg of sodium per serving), or \"low-sodium\" (less than 140 mg of sodium per serving). Foods labeled \"reduced-sodium\" and \"light sodium\" may still have too much sodium. Be sure to read the label to see how much sodium you are getting. · Buy fresh vegetables, or frozen vegetables without added sauces. Buy low-sodium versions of canned vegetables, soups, and other canned goods. Prepare low-sodium meals · Cut back on the amount of salt you use in cooking. This will help you adjust to the taste. Do not add salt after cooking. One teaspoon of salt has about 2,300 mg of sodium. · Take the salt shaker off the table. · Flavor your food with garlic, lemon juice, onion, vinegar, herbs, and spices. Do not use soy sauce, lite soy sauce, steak sauce, onion salt, garlic salt, celery salt, mustard, or ketchup on your food. · Use low-sodium salad dressings, sauces, and ketchup. Or make your own salad dressings and sauces without adding salt. · Use less salt (or none) when recipes call for it. You can often use half the salt a recipe calls for without losing flavor. Other foods such as rice, pasta, and grains do not need added salt. · Rinse canned vegetables, and cook them in fresh water.  This removes somebut not allof the salt. · Avoid water that is naturally high in sodium or that has been treated with water softeners, which add sodium. Call your local water company to find out the sodium content of your water supply. If you buy bottled water, read the label and choose a sodium-free brand. Avoid high-sodium foods · Avoid eating: ¨ Smoked, cured, salted, and canned meat, fish, and poultry. ¨ Ham, mills, hot dogs, and luncheon meats. ¨ Regular, hard, and processed cheese and regular peanut butter. ¨ Crackers with salted tops, and other salted snack foods such as pretzels, chips, and salted popcorn. ¨ Frozen prepared meals, unless labeled low-sodium. ¨ Canned and dried soups, broths, and bouillon, unless labeled sodium-free or low-sodium. ¨ Canned vegetables, unless labeled sodium-free or low-sodium. ¨ Western Beatriz fries, pizza, tacos, and other fast foods. ¨ Pickles, olives, ketchup, and other condiments, especially soy sauce, unless labeled sodium-free or low-sodium. Where can you learn more? Go to http://randa-pavel.info/. Enter I999 in the search box to learn more about \"Low Sodium Diet (2,000 Milligram): Care Instructions. \" Current as of: July 26, 2016 Content Version: 11.2 © 2530-6857 [employer]. Care instructions adapted under license by [employer] (which disclaims liability or warranty for this information). If you have questions about a medical condition or this instruction, always ask your healthcare professional. [place] [address] any warranty or liability for your use of this information. Diet for Chronic Kidney Disease (Before Dialysis): Care Instructions Your Care Instructions When you have chronic kidney disease, you need to change your diet to avoid foods that make your kidneys worse. You may need to limit salt, fluids, and protein.  You also may need to limit minerals such as potassium and phosphorus. A diet for chronic kidney disease takes planning. A dietitian who specializes in kidney disease can help you plan meals that meet your needs. These guidelines are for people who are not on dialysis. Talk with your doctor or dietitian to make sure your diet is right for your condition. Do not change your diet without talking to your doctor or dietitian. Follow-up care is a key part of your treatment and safety. Be sure to make and go to all appointments, and call your doctor if you are having problems. It's also a good idea to know your test results and keep a list of the medicines you take. How can you care for yourself at home? · Work with your doctor or dietitian to create a food plan. · Do not skip meals or go for many hours without eating. If you do not feel very hungry, try to eat 4 or 5 small meals instead of 1 or 2 big meals. · If you have a hard time eating enough, talk to your doctor or dietitian about ways you can add calories to your diet. · Do not take any vitamins or minerals, supplements, or herbal products without talking to your doctor first. 
· Check with your doctor about whether it is safe for you to drink alcohol. To get the right amount of protein · Ask your doctor or dietitian how much protein you can have each day. Most people with chronic kidney disease need to limit the amount of protein they eat. But you still need some protein to stay healthy. · Include all sources of protein in your daily protein count. Besides meat, poultry, fish, and eggs, protein is found in milk and milk products, beans and nuts, breads, cereals, and vegetables. To limit salt · Read food labels on cans and food packages. The labels tell you how much sodium is in each serving. Make sure that you look at the serving size. If you eat more than the serving size, you will get more sodium than what is listed on the label. · Do not add salt to your food.  Avoid foods that list salt, sodium, or monosodium glutamate (MSG) as an ingredient. · Buy foods that are labeled \"no salt added,\" \"sodium-free,\" or \"low-sodium. \" Foods labeled \"reduced-sodium\" and \"light sodium\" may still have too much sodium. · Limit processed foods, fast food, and restaurant foods. These types of food are very high in sodium. · Avoid salted pretzels, chips, popcorn, and other salted snacks. · Avoid smoked, cured, salted, and canned meat, fish, and poultry. This includes ham, mills, hot dogs, and luncheon meats. · You may use lemon, herbs, and spices to flavor your meals. To limit potassium · Choose low-potassium fruits such as blueberries and raspberries. · Choose low-potassium vegetables such as cucumbers and radishes. · Do not use a salt substitute or lite salt unless your doctor says it is okay. Most salt substitutes and lite salts are high in potassium. To limit phosphorus · Follow your food plan to know how much milk and milk products you can have. · Limit nuts, peanut butter, seeds, lentils, beans, organ meats, and sardines. · Avoid cola drinks. · Avoid bran breads or bran cereals. If you need to limit fluids · Know how much fluid you can drink. Every day fill a pitcher with that amount of water. If you drink another fluid (such as coffee) that day, pour an equal amount of water out of the pitcher. · Count foods that are liquid at room temperature, such as gelatin dessert and ice cream, as fluids. Where can you learn more? Go to http://randa-pavel.info/. Enter D312 in the search box to learn more about \"Diet for Chronic Kidney Disease (Before Dialysis): Care Instructions. \" Current as of: July 26, 2016 Content Version: 11.2 © 5274-8021 [employer]. Care instructions adapted under license by [employer] (which disclaims liability or warranty for this information).  If you have questions about a medical condition or this instruction, always ask your healthcare professional. [address] any warranty or liability for your use of this information. Please provide this summary of care documentation to your next provider. Your primary care clinician is listed as Raj Luna. If you have any questions after today's visit, please call [phone].

## 2017-03-31 NOTE — PATIENT INSTRUCTIONS
Low Sodium Diet (2,000 Milligram): Care Instructions  Your Care Instructions  Too much sodium causes your body to hold on to extra water. This can raise your blood pressure and force your heart and kidneys to work harder. In very serious cases, this could cause you to be put in the hospital. It might even be life-threatening. By limiting sodium, you will feel better and lower your risk of serious problems. The most common source of sodium is salt. People get most of the salt in their diet from canned, prepared, and packaged foods. Fast food and restaurant meals also are very high in sodium. Your doctor will probably limit your sodium to less than 2,000 milligrams (mg) a day. This limit counts all the sodium in prepared and packaged foods and any salt you add to your food. Follow-up care is a key part of your treatment and safety. Be sure to make and go to all appointments, and call your doctor if you are having problems. It's also a good idea to know your test results and keep a list of the medicines you take. How can you care for yourself at home? Read food labels  · Read labels on cans and food packages. The labels tell you how much sodium is in each serving. Make sure that you look at the serving size. If you eat more than the serving size, you have eaten more sodium. · Food labels also tell you the Percent Daily Value for sodium. Choose products with low Percent Daily Values for sodium. · Be aware that sodium can come in forms other than salt, including monosodium glutamate (MSG), sodium citrate, and sodium bicarbonate (baking soda). MSG is often added to Asian food. When you eat out, you can sometimes ask for food without MSG or added salt. Buy low-sodium foods  · Buy foods that are labeled \"unsalted\" (no salt added), \"sodium-free\" (less than 5 mg of sodium per serving), or \"low-sodium\" (less than 140 mg of sodium per serving).  Foods labeled \"reduced-sodium\" and \"light sodium\" may still have too much sodium. Be sure to read the label to see how much sodium you are getting. · Buy fresh vegetables, or frozen vegetables without added sauces. Buy low-sodium versions of canned vegetables, soups, and other canned goods. Prepare low-sodium meals  · Cut back on the amount of salt you use in cooking. This will help you adjust to the taste. Do not add salt after cooking. One teaspoon of salt has about 2,300 mg of sodium. · Take the salt shaker off the table. · Flavor your food with garlic, lemon juice, onion, vinegar, herbs, and spices. Do not use soy sauce, lite soy sauce, steak sauce, onion salt, garlic salt, celery salt, mustard, or ketchup on your food. · Use low-sodium salad dressings, sauces, and ketchup. Or make your own salad dressings and sauces without adding salt. · Use less salt (or none) when recipes call for it. You can often use half the salt a recipe calls for without losing flavor. Other foods such as rice, pasta, and grains do not need added salt. · Rinse canned vegetables, and cook them in fresh water. This removes somebut not allof the salt. · Avoid water that is naturally high in sodium or that has been treated with water softeners, which add sodium. Call your local water company to find out the sodium content of your water supply. If you buy bottled water, read the label and choose a sodium-free brand. Avoid high-sodium foods  · Avoid eating:  ¨ Smoked, cured, salted, and canned meat, fish, and poultry. ¨ Ham, mills, hot dogs, and luncheon meats. ¨ Regular, hard, and processed cheese and regular peanut butter. ¨ Crackers with salted tops, and other salted snack foods such as pretzels, chips, and salted popcorn. ¨ Frozen prepared meals, unless labeled low-sodium. ¨ Canned and dried soups, broths, and bouillon, unless labeled sodium-free or low-sodium. ¨ Canned vegetables, unless labeled sodium-free or low-sodium. ¨ [place] fries, pizza, tacos, and other fast foods.   Margarita Mensah olives, ketchup, and other condiments, especially soy sauce, unless labeled sodium-free or low-sodium. Where can you learn more? Go to http://randa-pavel.info/. Enter J260 in the search box to learn more about \"Low Sodium Diet (2,000 Milligram): Care Instructions. \"  Current as of: July 26, 2016  Content Version: 11.2  © 6776-5838 [employer]. Care instructions adapted under license by [employer] (which disclaims liability or warranty for this information). If you have questions about a medical condition or this instruction, always ask your healthcare professional. [address] any warranty or liability for your use of this information. Diet for Chronic Kidney Disease (Before Dialysis): Care Instructions  Your Care Instructions  When you have chronic kidney disease, you need to change your diet to avoid foods that make your kidneys worse. You may need to limit salt, fluids, and protein. You also may need to limit minerals such as potassium and phosphorus. A diet for chronic kidney disease takes planning. A dietitian who specializes in kidney disease can help you plan meals that meet your needs. These guidelines are for people who are not on dialysis. Talk with your doctor or dietitian to make sure your diet is right for your condition. Do not change your diet without talking to your doctor or dietitian. Follow-up care is a key part of your treatment and safety. Be sure to make and go to all appointments, and call your doctor if you are having problems. It's also a good idea to know your test results and keep a list of the medicines you take. How can you care for yourself at home? · Work with your doctor or dietitian to create a food plan. · Do not skip meals or go for many hours without eating. If you do not feel very hungry, try to eat 4 or 5 small meals instead of 1 or 2 big meals.   · If you have a hard time eating enough, talk to your doctor or dietitian about ways you can add calories to your diet. · Do not take any vitamins or minerals, supplements, or herbal products without talking to your doctor first.  · Check with your doctor about whether it is safe for you to drink alcohol. To get the right amount of protein  · Ask your doctor or dietitian how much protein you can have each day. Most people with chronic kidney disease need to limit the amount of protein they eat. But you still need some protein to stay healthy. · Include all sources of protein in your daily protein count. Besides meat, poultry, fish, and eggs, protein is found in milk and milk products, beans and nuts, breads, cereals, and vegetables. To limit salt  · Read food labels on cans and food packages. The labels tell you how much sodium is in each serving. Make sure that you look at the serving size. If you eat more than the serving size, you will get more sodium than what is listed on the label. · Do not add salt to your food. Avoid foods that list salt, sodium, or monosodium glutamate (MSG) as an ingredient. · Buy foods that are labeled \"no salt added,\" \"sodium-free,\" or \"low-sodium. \" Foods labeled \"reduced-sodium\" and \"light sodium\" may still have too much sodium. · Limit processed foods, fast food, and restaurant foods. These types of food are very high in sodium. · Avoid salted pretzels, chips, popcorn, and other salted snacks. · Avoid smoked, cured, salted, and canned meat, fish, and poultry. This includes ham, mills, hot dogs, and luncheon meats. · You may use lemon, herbs, and spices to flavor your meals. To limit potassium  · Choose low-potassium fruits such as blueberries and raspberries. · Choose low-potassium vegetables such as cucumbers and radishes. · Do not use a salt substitute or lite salt unless your doctor says it is okay. Most salt substitutes and lite salts are high in potassium.   To limit phosphorus  · Follow your food plan to know how much milk and milk products you can have. · Limit nuts, peanut butter, seeds, lentils, beans, organ meats, and sardines. · Avoid cola drinks. · Avoid bran breads or bran cereals. If you need to limit fluids  · Know how much fluid you can drink. Every day fill a pitcher with that amount of water. If you drink another fluid (such as coffee) that day, pour an equal amount of water out of the pitcher. · Count foods that are liquid at room temperature, such as gelatin dessert and ice cream, as fluids. Where can you learn more? Go to http://randa-apvel.info/. Enter V013 in the search box to learn more about \"Diet for Chronic Kidney Disease (Before Dialysis): Care Instructions. \"  Current as of: July 26, 2016  Content Version: 11.2  © 3870-5725 [employer]. Care instructions adapted under license by [employer] (which disclaims liability or warranty for this information). If you have questions about a medical condition or this instruction, always ask your healthcare professional. [place] [address] any warranty or liability for your use of this information.

## 2017-03-31 NOTE — PROGRESS NOTES
Chronic Illness Visit    Today's Date: 3/31/2017j  Patient's Name: Blayne Quintanilla   Patient's :  1970     History:     Chief Complaint   Patient presents with   [facility] Follow Up     pt here for hospital follow up       Blayne Quintanilla is a 51 y.o. male presenting for a follow up of Chronic Illness. BP still elevated patient is stable since recent Hospital Discharge. Hypertension/ESRD on HD    BP today is not at goal and less hydd300/90 slightly improved on recheck. He has not taken all of his BP meds today. Patients risk factors include: ESRD on HD is compliant w/ treatment  Patient is no checking home BP. Patient has not taken any meds today  Daily exercise and diet are as follows: reports healthy diet denies staying active  Weight gain of 10 lbs since the last visit   Takes the below meds regularly with no side effects.   Last LDL: 515 (2015)        Past Medical History:   Diagnosis Date    Anemia in chronic kidney disease(285.21) 2013    Cardiomyopathy due to hypertension ([address] 2013    EF 20%    Edema of lower extremity 2013    End stage renal disease ([address] 2013    HD since 2003    Hyperphosphatemia 2013    Hypertension     Hypertension, uncontrolled 2013    Personal history of atrial flutter 2013    Pulmonary hypertension ([address] 2013    Recommendation refused by patient 2013    Patient refuses dialysis (hemodialysis or peritoneal dialysis)    Secondary hyperparathyroidism of renal origin ([address] 2013    Stroke ([address]     x2 2015    Vitamin D insufficiency 2013    Vitamin D 25-Hydroxy 20.7      Past Surgical History:   Procedure Laterality Date    HX CSF SHUNT  2016    HX HEART CATHETERIZATION  2015     Social History     Social History    Marital status: SINGLE     Spouse name: N/A    Number of children: N/A    Years of education: N/A     Social History Main Topics    Smoking status: Never Smoker    Smokeless tobacco: Never Used    Alcohol use No    Drug use: No    Sexual activity: No     Other Topics Concern    None     Social History Narrative     Family History   Problem Relation Age of Onset    Hypertension Mother     Hypertension Father     Heart Attack Neg Hx     Stroke Neg Hx      Allergies   Allergen Reactions    Amlodipine Swelling    Nuts [Tree Nut] Swelling    Pcn [Penicillins] Unknown (comments)    Phenothiazines Other (comments)     Per father, \"He has a parkinsonian reaction to this medication. \"       Problem List:      Patient Active Problem List   Diagnosis Code    End stage renal disease ([facility] N18.6    New onset seizure ([facility] R56.9    Hypertension, uncontrolled I10    Impaired mobility and ADLs Z74.09    Personal history of atrial flutter Z86.79    Pulmonary hypertension ([facility] I27.2    Cardiomyopathy due to hypertension (HCC) I11.9, I42.9    Edema of lower extremity R60.0    Anemia in chronic kidney disease N18.9, D63.1    Secondary hyperparathyroidism of renal origin ([facility] N25.81    Vitamin D insufficiency E55.9    Generalized weakness R53.1    ARF (acute renal failure) (HCC) F44.7    Metabolic acidosis G07.1    Uncontrolled hypertension I10    Accelerated hypertension I10    Hypertensive emergency I16.1    Advance care planning Z71.89    Renal failure N19    Anemia D64.9    Acute renal failure (HCC) N17.9    Syncope R55    Seizure (HCC) R56.9    Non compliance w medication regimen Z91.14       Medications:     Current Outpatient Prescriptions   Medication Sig    sevelamer carbonate (RENVELA) 800 mg tab tab Take  by mouth three (3) times daily.  mupirocin calcium (BACTROBAN NASAL) 2 % nasal ointment by Both Nostrils route two (2) times a day.  allopurinol (ZYLOPRIM) 100 mg tablet Take 1 Tab by mouth two (2) times a day.  carvedilol (COREG) 25 mg tablet Take 1 Tab by mouth two (2) times daily (with meals).     b complex-vitamin c-folic acid (NEPHROCAPS) 1 mg capsule Take 1 Cap by mouth daily. [Request refills from PCP (Dr. Anna Priest)]    NIFEdipine ER (PROCARDIA XL) 30 mg ER tablet Take 1 Tab by mouth daily.  lisinopril (PRINIVIL, ZESTRIL) 40 mg tablet TAKE 1 TABLET BY MOUTH DAILY    levETIRAcetam (KEPPRA) 250 mg tablet TAKE 1 TABLET BY MOUTH EVERY Monday, Wednesday, Friday AFTER EACH DIALYSIS    levETIRAcetam (KEPPRA) 500 mg tablet TAKE 1 TABLET BY MOUTH DAILY    acetaminophen (TYLENOL) 500 mg tablet Take  by mouth every six (6) hours as needed for Pain.  hydrALAZINE (APRESOLINE) 100 mg tablet TAKE 1 TABLET BY MOUTH EVERY 8 HOURS    cloNIDine HCl (CATAPRES) 0.3 mg tablet Take 1 Tab by mouth three (3) times daily.  isosorbide mononitrate ER (IMDUR) 60 mg CR tablet Take 1 Tab by mouth every morning.  oxyCODONE-acetaminophen (PERCOCET) 5-325 mg per tablet Take 1 Tab by mouth every eight (8) hours as needed. Max Daily Amount: 3 Tabs.  senna-docusate (PERICOLACE) 8.6-50 mg per tablet Take 1 Tab by mouth daily.  aspirin delayed-release 81 mg tablet Take 1 tablet by mouth daily.  pantoprazole (PROTONIX) 40 mg tablet Take 1 Tab by mouth Daily (before breakfast). [Request refills from PCP (Dr. Anna Priest)]    cholecalciferol (VITAMIN D3) 1,000 unit tablet Take 2 Tabs by mouth two (2) times a day. [Request refills from PCP (Dr. Anna Priest)]    AMINO ACIDS/PROTEIN HYDROLYS (PRO-STAT 64 PO) Take  by mouth.  simethicone (PHAZYME) 180 mg cap Take  by mouth.  calcium acetate (PHOSLO) 667 mg cap Take 1 Cap by mouth three (3) times daily (with meals).  metoprolol tartrate (LOPRESSOR) 100 mg IR tablet Take 2 Tabs by mouth every twelve (12) hours. No current facility-administered medications for this visit.           Constitutional: negative for fevers, chills, sweats and fatigue  Respiratory: negative for cough, sputum or wheezing  Cardiovascular: negative for chest pain, chest pressure/discomfort, dyspnea  Gastrointestinal: positive for nausea, vomiting, diarrhea negative for constipation and abdominal pain  Musculoskeletal: positive for myalgias and arthralgias  Neurological: negative for headaches and dizziness  Behavioral/Psych: negative for anxiety and depression    Physical Assessment:   VS:    Vitals:    03/31/17 1003 03/31/17 1036   BP: (!) 197/105 (!) 170/100   Pulse: 74    Resp: 20    Temp: 97.8 °F (36.6 °C)    TempSrc: Oral    SpO2: 100%    Weight: 155 lb (70.3 kg)    Height: 5' 11\" (1.803 m)            Lab Results   Component Value Date/Time    Sodium 139 03/05/2017 04:11 AM    Potassium 4.2 03/05/2017 04:11 AM    Chloride 101 03/05/2017 04:11 AM    CO2 27 03/05/2017 04:11 AM    Anion gap 11 03/05/2017 04:11 AM    Glucose 83 03/05/2017 04:11 AM    BUN 46 03/05/2017 04:11 AM    Creatinine 5.86 03/05/2017 04:11 AM    BUN/Creatinine ratio 8 03/05/2017 04:11 AM    GFR est AA 13 03/05/2017 04:11 AM    GFR est non-AA 10 03/05/2017 04:11 AM    Calcium 9.7 03/05/2017 04:11 AM        General:   Well-groomed, well-nourished, in no distress, pleasant, alert, appropriate and conversant. Mouth:  Good dentition, oropharynx WNL without membranes, exudates, petechiae or ulcers  Cardiovasc:   RRR, no MRG. Pulses 2+ and symmetric at distal extremities. Pulmonary:   Lungs clear bilaterally. Normal respiratory effort. Abdomen:   Abdomen distended, soft, NT,NAB. Protruding belly button. Extremities:   No edema, no TTP bilateral calves. LEs warm and well-perfused. Neuro:   Alert and oriented, no focal deficits. No facial asymmetry noted. Skin:    No rashes or jaundice  MSK:   Normal ROM, 5/5 muscle strength  Psych:  No pressured speech or abnormal thought content        Assessment/Plan & Orders:       1. Cardiomyopathy due to hypertension, with heart failure ([address]    2. Hypertension, uncontrolled    3.  Chronic gout, unspecified cause, unspecified site        Orders Placed This Encounter    sevelamer carbonate (RENVELA) 800 mg tab tab    AMINO ACIDS/PROTEIN HYDROLYS (PRO-STAT 64 PO)    mupirocin calcium (BACTROBAN NASAL) 2 % nasal ointment    DISCONTD: NIFEdipine ER (PROCARDIA XL) 30 mg ER tablet    DISCONTD: carvedilol (COREG) 25 mg tablet    DISCONTD: allopurinol (ZYLOPRIM) 100 mg tablet    allopurinol (ZYLOPRIM) 100 mg tablet    carvedilol (COREG) 25 mg tablet    b complex-vitamin c-folic acid (NEPHROCAPS) 1 mg capsule    NIFEdipine ER (PROCARDIA XL) 30 mg ER tablet     Stable since recent Hospital Admission     Hypertension/ESRD BP is grossly uncontrolled. Will continue w/ current meds. BP grossly elevated patient has not taken meds today. Recommend he take meds and recheck BP if BP still elevated recommend he go to ED patient agreed w/ the plan. [facility] will refer to Podiatry. Most likely due to gout.     Follow up in 2-3 months    Phuong Dyson MD  Family Medicine  3/31/2017  10:04 AM

## 2017-04-10 ENCOUNTER — OFFICE VISIT (OUTPATIENT)
Dept: CARDIOLOGY CLINIC | Age: 47
End: 2017-04-10

## 2017-04-10 VITALS
SYSTOLIC BLOOD PRESSURE: 191 MMHG | HEART RATE: 72 BPM | DIASTOLIC BLOOD PRESSURE: 128 MMHG | HEIGHT: 71 IN | WEIGHT: 144 LBS | BODY MASS INDEX: 20.16 KG/M2

## 2017-04-10 DIAGNOSIS — I11.0 CARDIOMYOPATHY DUE TO HYPERTENSION, WITH HEART FAILURE (HCC): Chronic | ICD-10-CM

## 2017-04-10 DIAGNOSIS — N18.6 END STAGE RENAL DISEASE (HCC): Chronic | ICD-10-CM

## 2017-04-10 DIAGNOSIS — I43 CARDIOMYOPATHY DUE TO HYPERTENSION, WITH HEART FAILURE (HCC): Chronic | ICD-10-CM

## 2017-04-10 DIAGNOSIS — I10 UNCONTROLLED HYPERTENSION: Primary | ICD-10-CM

## 2017-04-10 DIAGNOSIS — R56.9 SEIZURE (HCC): ICD-10-CM

## 2017-04-10 DIAGNOSIS — I10 UNCONTROLLED HYPERTENSION: ICD-10-CM

## 2017-04-10 DIAGNOSIS — R18.8 OTHER ASCITES: ICD-10-CM

## 2017-04-10 DIAGNOSIS — R55 SYNCOPE, UNSPECIFIED SYNCOPE TYPE: ICD-10-CM

## 2017-04-10 RX ORDER — DOXAZOSIN 1 MG/1
1 TABLET ORAL
Qty: 30 TAB | Refills: 3 | Status: SHIPPED | OUTPATIENT
Start: 2017-04-10 | End: 2017-04-10 | Stop reason: SDUPTHER

## 2017-04-10 RX ORDER — DOXAZOSIN 1 MG/1
TABLET ORAL
Qty: 90 TAB | Refills: 3 | Status: SHIPPED | OUTPATIENT
Start: 2017-04-10 | End: 2019-02-14

## 2017-04-10 RX ORDER — ISOSORBIDE MONONITRATE 120 MG/1
TABLET, EXTENDED RELEASE ORAL
Qty: 90 TAB | Refills: 5 | Status: SHIPPED | OUTPATIENT
Start: 2017-04-10 | End: 2019-01-14 | Stop reason: SDUPTHER

## 2017-04-10 RX ORDER — ISOSORBIDE MONONITRATE 120 MG/1
120 TABLET, EXTENDED RELEASE ORAL
Qty: 30 TAB | Refills: 5 | Status: SHIPPED | OUTPATIENT
Start: 2017-04-10 | End: 2017-04-10 | Stop reason: SDUPTHER

## 2017-04-10 RX ORDER — NIFEDIPINE 90 MG/1
TABLET, EXTENDED RELEASE ORAL
Qty: 30 TAB | Refills: 5 | Status: SHIPPED | OUTPATIENT
Start: 2017-04-10 | End: 2017-04-12 | Stop reason: DRUGHIGH

## 2017-04-10 RX ORDER — LANOLIN ALCOHOL/MO/W.PET/CERES
CREAM (GRAM) TOPICAL DAILY
COMMUNITY

## 2017-04-10 NOTE — PROGRESS NOTES
1. Have you been to the ER, urgent care clinic since your last visit? Hospitalized since your last visit? Yes When: 2/10/17 & 3/5/17 / 12/23 & 12/5 Where: [facility] ER  Reason for visit: SOB / Epistaxis/ abd pain / syncope    3/20/17 Patient went to [facility] for Epistaxis     2. Have you seen or consulted any other health care providers outside of the [address] [place] [address] since your last visit? Include any pap smears or colon screening. No     3. Since your last visit, have you had any of the following symptoms? None       4. Have you had any blood work, X-rays or cardiac testing? Yes When: 2/10/17 & 3/5/17 ER labs/ EKG      PVL, LABS and X-ray in      Requested: NO     In [facility]: YES    5. Where do you normally have your labs drawn? [facility] or [facility]     6. Do you need any refills today?  no      Medications confirmed verbally by patient

## 2017-04-10 NOTE — PATIENT INSTRUCTIONS
After the recommended changes have been made in blood pressure medicines, patient advised to keep BP/HR(pulse rate) chart twice daily and bring us results in next 2 weeks or so. Patient may send the results via \"My Chart\" if desired. Please rest for 5-10 minutes before checking blood pressure  Medications Discontinued During This Encounter   Medication Reason    NIFEdipine ER (PROCARDIA XL) 30 mg ER tablet Reorder    isosorbide mononitrate ER (IMDUR) 60 mg CR tablet Reorder       Orders Placed This Encounter    NIFEdipine ER (PROCARDIA XL) 90 mg ER tablet     Sig: TAKE 3 TABLET BY MOUTH DAILY  Indications: hypertension     Dispense:  30 Tab     Refill:  5     **Patient requests 90 days supply**    isosorbide mononitrate ER (IMDUR) 120 mg CR tablet     Sig: Take 1 Tab by mouth every morning. Dispense:  30 Tab     Refill:  5     **Patient requests 90 days supply**    doxazosin (CARDURA) 1 mg tablet     Sig: Take 1 Tab by mouth nightly. Dispense:  30 Tab     Refill:  3     Patient education included a review of the importance of compliance in the treatment regimen       High Blood Pressure: Care Instructions  Your Care Instructions  If your blood pressure is usually above 140/90, you have high blood pressure, or hypertension. That means the top number is 140 or higher or the bottom number is 90 or higher, or both. Despite what a lot of people think, high blood pressure usually doesn't cause headaches or make you feel dizzy or lightheaded. It usually has no symptoms. But it does increase your risk for heart attack, stroke, and kidney or eye damage. The higher your blood pressure, the more your risk increases. Your doctor will give you a goal for your blood pressure. Your goal will be based on your health and your age. An example of a goal is to keep your blood pressure below 140/90. Lifestyle changes, such as eating healthy and being active, are always important to help lower blood pressure.  You might also take medicine to reach your blood pressure goal.  Follow-up care is a key part of your treatment and safety. Be sure to make and go to all appointments, and call your doctor if you are having problems. It's also a good idea to know your test results and keep a list of the medicines you take. How can you care for yourself at home? Medical treatment  · If you stop taking your medicine, your blood pressure will go back up. You may take one or more types of medicine to lower your blood pressure. Be safe with medicines. Take your medicine exactly as prescribed. Call your doctor if you think you are having a problem with your medicine. · Talk to your doctor before you start taking aspirin every day. Aspirin can help certain people lower their risk of a heart attack or stroke. But taking aspirin isn't right for everyone, because it can cause serious bleeding. · See your doctor regularly. You may need to see the doctor more often at first or until your blood pressure comes down. · If you are taking blood pressure medicine, talk to your doctor before you take decongestants or anti-inflammatory medicine, such as ibuprofen. Some of these medicines can raise blood pressure. · Learn how to check your blood pressure at home. Lifestyle changes  · Stay at a healthy weight. This is especially important if you put on weight around the waist. Losing even 10 pounds can help you lower your blood pressure. · If your doctor recommends it, get more exercise. Walking is a good choice. Bit by bit, increase the amount you walk every day. Try for at least 30 minutes on most days of the week. You also may want to swim, bike, or do other activities. · Avoid or limit alcohol. Talk to your doctor about whether you can drink any alcohol. · Try to limit how much sodium you eat to less than 2,300 milligrams (mg) a day. Your doctor may ask you to try to eat less than 1,500 mg a day.   · Eat plenty of fruits (such as bananas and oranges), vegetables, legumes, whole grains, and low-fat dairy products. · Lower the amount of saturated fat in your diet. Saturated fat is found in animal products such as milk, cheese, and meat. Limiting these foods may help you lose weight and also lower your risk for heart disease. · Do not smoke. Smoking increases your risk for heart attack and stroke. If you need help quitting, talk to your doctor about stop-smoking programs and medicines. These can increase your chances of quitting for good. When should you call for help? Call 911 anytime you think you may need emergency care. This may mean having symptoms that suggest that your blood pressure is causing a serious heart or blood vessel problem. Your blood pressure may be over 180/110. For example, call 911 if:  · You have symptoms of a heart attack. These may include:  ¨ Chest pain or pressure, or a strange feeling in the chest.  ¨ Sweating. ¨ Shortness of breath. ¨ Nausea or vomiting. ¨ Pain, pressure, or a strange feeling in the back, neck, jaw, or upper belly or in one or both shoulders or arms. ¨ Lightheadedness or sudden weakness. ¨ A fast or irregular heartbeat. · You have symptoms of a stroke. These may include:  ¨ Sudden numbness, tingling, weakness, or loss of movement in your face, arm, or leg, especially on only one side of your body. ¨ Sudden vision changes. ¨ Sudden trouble speaking. ¨ Sudden confusion or trouble understanding simple statements. ¨ Sudden problems with walking or balance. ¨ A sudden, severe headache that is different from past headaches. · You have severe back or belly pain. Do not wait until your blood pressure comes down on its own. Get help right away. Call your doctor now or seek immediate care if:  · Your blood pressure is much higher than normal (such as 180/110 or higher), but you don't have symptoms. · You think high blood pressure is causing symptoms, such as:  ¨ Severe headache. ¨ Blurry vision.   Watch closely for changes in your health, and be sure to contact your doctor if:  · Your blood pressure measures 140/90 or higher at least 2 times. That means the top number is 140 or higher or the bottom number is 90 or higher, or both. · You think you may be having side effects from your blood pressure medicine. · Your blood pressure is usually normal, but it goes above normal at least 2 times. Where can you learn more? Go to http://randa-pavel.info/. Enter H215 in the search box to learn more about \"High Blood Pressure: Care Instructions. \"  Current as of: August 8, 2016  Content Version: 11.2  © 4635-6934 [employer]. Care instructions adapted under license by [employer] (which disclaims liability or warranty for this information). If you have questions about a medical condition or this instruction, always ask your healthcare professional. [address] any warranty or liability for your use of this information.

## 2017-04-10 NOTE — MR AVS SNAPSHOT
Visit Information Date & Time Provider Department Dept. Phone Encounter #  
 4/10/2017  9:45 AM Flynn Pepper MD [facility] [address] [place] [address] [id number] Follow-up Instructions Return in about 3 months (around 7/10/2017), or if symptoms worsen or fail to improve. Your Appointments 4/28/2017  9:30 AM  
FOLLOW UP EXAM with Naresh Harper MD  
[facility]) Appt Note: 1 month f/u  
 [address] [facility] [id number]  
[facility] [id number] 5/11/2017 11:30 AM  
Office Visit with Naresh Harper MD  
[facility]) Appt Note: [address] [facility] [id number]  
[facility] [id number]  
  
    
 7/31/2017 10:45 AM  
ESTABLISHED PATIENT with Flynn Pepper MD  
[facility]) Appt Note: 3 months [address], [address] [facility] [address] [facility], [address] Upcoming Health Maintenance Date Due Pneumococcal 19-64 Highest Risk (1 of 3 - PCV13) 10/1/1988 DTaP/Tdap/Td series (1 - Tdap) 10/1/1990 Allergies as of 4/10/2017  Review Complete On: 4/10/2017 By: Flynn Pepper MD  
  
 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 \" Current Immunizations  Reviewed on 6/28/2015 Name Date Influenza Vaccine (Quad) PF 9/20/2016  6:50 PM  
  
 Not reviewed this visit You Were Diagnosed With   
  
 Codes Comments  Uncontrolled hypertension    -  Primary ICD-10-CM: I10 
 ICD-9-CM: 401.9 4/17 BP seems labile but patient sometimes 521-840 systolic range. We will add Cardura Cardiomyopathy due to hypertension, with heart failure ([facility]     ICD-10-CM: I11.0 ICD-9-CM: 402.91 4/17 he was 35-40% in December 2060 End stage renal disease ([facility]     ICD-10-CM: N18.6 ICD-9-CM: 585.6 HD started 2009 approximately Syncope, unspecified syncope type     ICD-10-CM: R55 
ICD-9-CM: 780.2 Medicine procedures Other ascites     ICD-10-CM: R18.8 ICD-9-CM: 789.59 Last Tap approximately December 2016 Seizure ([facility]     ICD-10-CM: R56.9 ICD-9-CM: 780.39 Last episode December 2016 approximately. patient on Keppra Vitals BP Pulse Height(growth percentile) Weight(growth percentile) BMI Smoking Status (!) 191/128 72 5' 11\" (1.803 m) 144 lb (65.3 kg) 20.08 kg/m2 Never Smoker Vitals History BMI and BSA Data Body Mass Index Body Surface Area 20.08 kg/m 2 1.81 m 2 Preferred Pharmacy Pharmacy Name Phone [facility] [address] [place] [address] [phone] Your Updated Medication List  
  
   
This list is accurate as of: 4/10/17 11:19 AM.  Always use your most recent med list.  
  
  
  
  
 acetaminophen 500 mg tablet Commonly known as:  TYLENOL Take  by mouth every six (6) hours as needed for Pain. allopurinol 100 mg tablet Commonly known as:  ZYLOPRIM  
TAKE 1 TABLET BY MOUTH TWICE DAILY  
  
 aspirin delayed-release 81 mg tablet Take 1 tablet by mouth daily. b complex-vitamin c-folic acid 1 mg capsule Commonly known as:  Casey Folks Take 1 Cap by mouth daily. [Request refills from PCP (Dr. Cl Priest)] BACTROBAN NASAL 2 % nasal ointment Generic drug:  mupirocin calcium  
by Both Nostrils route two (2) times a day. calcium acetate 667 mg Cap Commonly known as:  PHOSLO Take 1 Cap by mouth three (3) times daily (with meals). carvedilol 25 mg tablet Commonly known as:  COREG  
TAKE 1 TABLET BY MOUTH TWICE DAILY WITH MEALS  
  
 cholecalciferol 1,000 unit tablet Commonly known as:  VITAMIN D3 Take 2 Tabs by mouth two (2) times a day. [Request refills from PCP (Dr. Cindy Priest)]  
  
 cloNIDine HCl 0.3 mg tablet Commonly known as:  CATAPRES Take 1 Tab by mouth three (3) times daily. doxazosin 1 mg tablet Commonly known as:  CARDURA Take 1 Tab by mouth nightly. hydrALAZINE 100 mg tablet Commonly known as:  APRESOLINE  
TAKE 1 TABLET BY MOUTH EVERY 8 HOURS  
  
 isosorbide mononitrate  mg CR tablet Commonly known as:  IMDUR Take 1 Tab by mouth every morning. * levETIRAcetam 250 mg tablet Commonly known as:  KEPPRA TAKE 1 TABLET BY MOUTH EVERY Monday, Wednesday, Friday AFTER EACH DIALYSIS  
  
 * levETIRAcetam 500 mg tablet Commonly known as:  KEPPRA TAKE 1 TABLET BY MOUTH DAILY  
  
 lisinopril 40 mg tablet Commonly known as:  PRINIVIL, ZESTRIL  
TAKE 1 TABLET BY MOUTH DAILY  
  
 metoprolol tartrate 100 mg IR tablet Commonly known as:  LOPRESSOR Take 2 Tabs by mouth every twelve (12) hours. NIFEdipine ER 90 mg ER tablet Commonly known as:  PROCARDIA XL  
TAKE 3 TABLET BY MOUTH DAILY  Indications: hypertension  
  
 oxyCODONE-acetaminophen 5-325 mg per tablet Commonly known as:  PERCOCET Take 1 Tab by mouth every eight (8) hours as needed. Max Daily Amount: 3 Tabs. pantoprazole 40 mg tablet Commonly known as:  PROTONIX Take 1 Tab by mouth Daily (before breakfast). [Request refills from PCP (Dr. Cindy Priest)] PHAZYME 180 mg Cap Generic drug:  simethicone Take  by mouth. PRO-STAT 64 PO Take  by mouth. RENVELA 800 mg Tab tab Generic drug:  sevelamer carbonate Take  by mouth three (3) times daily. senna-docusate 8.6-50 mg per tablet Commonly known as:  Corrinne Heft Take 1 Tab by mouth daily. VITAMIN B-1 100 mg tablet Generic drug:  thiamine Take  by mouth daily. * Notice: This list has 2 medication(s) that are the same as other medications prescribed for you. Read the directions carefully, and ask your doctor or other care provider to review them with you. Prescriptions Sent to Pharmacy Refills NIFEdipine ER (PROCARDIA XL) 90 mg ER tablet 5 Sig: TAKE 3 TABLET BY MOUTH DAILY  Indications: hypertension Class: Normal  
 Pharmacy: [facility] [address] Ph #: [phone]  
 isosorbide mononitrate ER (IMDUR) 120 mg CR tablet 5 Sig: Take 1 Tab by mouth every morning. Class: Normal  
 Pharmacy: [facility] [address] [place] [address] Ph #: [phone] Route: Oral  
 doxazosin (CARDURA) 1 mg tablet 3 Sig: Take 1 Tab by mouth nightly. Class: Normal  
 Pharmacy: [facility] [address] [place] [address] Ph #: [phone] Route: Oral  
  
Follow-up Instructions Return in about 3 months (around 7/10/2017), or if symptoms worsen or fail to improve. Patient Instructions After the recommended changes have been made in blood pressure medicines, patient advised to keep BP/HR(pulse rate) chart twice daily and bring us results in next 2 weeks or so. Patient may send the results via \"My Chart\" if desired. Please rest for 5-10 minutes before checking blood pressure Medications Discontinued During This Encounter Medication Reason  NIFEdipine ER (PROCARDIA XL) 30 mg ER tablet Reorder  isosorbide mononitrate ER (IMDUR) 60 mg CR tablet Reorder Orders Placed This Encounter  NIFEdipine ER (PROCARDIA XL) 90 mg ER tablet Sig: TAKE 3 TABLET BY MOUTH DAILY  Indications: hypertension Dispense:  30 Tab   Refill:  5  
  **Patient requests 90 days supply**  
  isosorbide mononitrate ER (IMDUR) 120 mg CR tablet Sig: Take 1 Tab by mouth every morning. Dispense:  30 Tab Refill:  5  
  **Patient requests 90 days supply**  
 doxazosin (CARDURA) 1 mg tablet Sig: Take 1 Tab by mouth nightly. Dispense:  30 Tab Refill:  3 Patient education included a review of the importance of compliance in the treatment regimen High Blood Pressure: Care Instructions Your Care Instructions If your blood pressure is usually above 140/90, you have high blood pressure, or hypertension. That means the top number is 140 or higher or the bottom number is 90 or higher, or both. Despite what a lot of people think, high blood pressure usually doesn't cause headaches or make you feel dizzy or lightheaded. It usually has no symptoms. But it does increase your risk for heart attack, stroke, and kidney or eye damage. The higher your blood pressure, the more your risk increases. Your doctor will give you a goal for your blood pressure. Your goal will be based on your health and your age. An example of a goal is to keep your blood pressure below 140/90. Lifestyle changes, such as eating healthy and being active, are always important to help lower blood pressure. You might also take medicine to reach your blood pressure goal. 
Follow-up care is a key part of your treatment and safety. Be sure to make and go to all appointments, and call your doctor if you are having problems. It's also a good idea to know your test results and keep a list of the medicines you take. How can you care for yourself at home? Medical treatment · If you stop taking your medicine, your blood pressure will go back up. You may take one or more types of medicine to lower your blood pressure. Be safe with medicines. Take your medicine exactly as prescribed. Call your doctor if you think you are having a problem with your medicine. · Talk to your doctor before you start taking aspirin every day. Aspirin can help certain people lower their risk of a heart attack or stroke. But taking aspirin isn't right for everyone, because it can cause serious bleeding. · See your doctor regularly. You may need to see the doctor more often at first or until your blood pressure comes down. · If you are taking blood pressure medicine, talk to your doctor before you take decongestants or anti-inflammatory medicine, such as ibuprofen. Some of these medicines can raise blood pressure. · Learn how to check your blood pressure at home. Lifestyle changes · Stay at a healthy weight. This is especially important if you put on weight around the waist. Losing even 10 pounds can help you lower your blood pressure. · If your doctor recommends it, get more exercise. Walking is a good choice. Bit by bit, increase the amount you walk every day. Try for at least 30 minutes on most days of the week. You also may want to swim, bike, or do other activities. · Avoid or limit alcohol. Talk to your doctor about whether you can drink any alcohol. · Try to limit how much sodium you eat to less than 2,300 milligrams (mg) a day. Your doctor may ask you to try to eat less than 1,500 mg a day. · Eat plenty of fruits (such as bananas and oranges), vegetables, legumes, whole grains, and low-fat dairy products. · Lower the amount of saturated fat in your diet. Saturated fat is found in animal products such as milk, cheese, and meat. Limiting these foods may help you lose weight and also lower your risk for heart disease. · Do not smoke. Smoking increases your risk for heart attack and stroke. If you need help quitting, talk to your doctor about stop-smoking programs and medicines. These can increase your chances of quitting for good. When should you call for help? Call 911 anytime you think you may need emergency care.  This may mean having symptoms that suggest that your blood pressure is causing a serious heart or blood vessel problem. Your blood pressure may be over 180/110. For example, call 911 if: 
· You have symptoms of a heart attack. These may include: ¨ Chest pain or pressure, or a strange feeling in the chest. 
¨ Sweating. ¨ Shortness of breath. ¨ Nausea or vomiting. ¨ Pain, pressure, or a strange feeling in the back, neck, jaw, or upper belly or in one or both shoulders or arms. ¨ Lightheadedness or sudden weakness. ¨ A fast or irregular heartbeat. · You have symptoms of a stroke. These may include: 
¨ Sudden numbness, tingling, weakness, or loss of movement in your face, arm, or leg, especially on only one side of your body. ¨ Sudden vision changes. ¨ Sudden trouble speaking. ¨ Sudden confusion or trouble understanding simple statements. ¨ Sudden problems with walking or balance. ¨ A sudden, severe headache that is different from past headaches. · You have severe back or belly pain. Do not wait until your blood pressure comes down on its own. Get help right away. Call your doctor now or seek immediate care if: 
· Your blood pressure is much higher than normal (such as 180/110 or higher), but you don't have symptoms. · You think high blood pressure is causing symptoms, such as: ¨ Severe headache. ¨ Blurry vision. Watch closely for changes in your health, and be sure to contact your doctor if: 
· Your blood pressure measures 140/90 or higher at least 2 times. That means the top number is 140 or higher or the bottom number is 90 or higher, or both. · You think you may be having side effects from your blood pressure medicine. · Your blood pressure is usually normal, but it goes above normal at least 2 times. Where can you learn more? Go to http://randa-pavel.info/. Enter A366 in the search box to learn more about \"High Blood Pressure: Care Instructions. \" Current as of: August 8, 2016 Content Version: 11.2 © 4693-7321 [employer] [facility] [employer]. Care instructions adapted under license by [employer] (which disclaims liability or warranty for this information). If you have questions about a medical condition or this instruction, always ask your healthcare professional. [address] any warranty or liability for your use of this information. Please provide this summary of care documentation to your next provider. Your primary care clinician is listed as Darlene Chakraborty. If you have any questions after today's visit, please call [phone].

## 2017-04-10 NOTE — LETTER
Jhonatan Garciaith 1970 
 
4/10/2017 Dear MD Beronica Kasper MD 
 
I had the pleasure of evaluating  Mr. Mely Butler in office today. Below are the relevant portions of my assessment and plan of care. ICD-10-CM ICD-9-CM 1. Uncontrolled hypertension I10 401.9 NIFEdipine ER (PROCARDIA XL) 90 mg ER tablet  
   isosorbide mononitrate ER (IMDUR) 120 mg CR tablet  
   doxazosin (CARDURA) 1 mg tablet 4/17 BP seems labile but patient sometimes 063-951 systolic range. We will add Cardura 2. Cardiomyopathy due to hypertension, with heart failure [facility]) I11.0 402.91   
 4/17 he was 35-40% in December 2060 3. End stage renal disease (HCC) N18.6 585.6 HD started 2009 approximately 4. Syncope, unspecified syncope type R55 780.2 Medicine procedures 5. Other ascites R18.8 789.59 Last Tap approximately December 2016 6. Seizure ([address] R56.9 780.39 Last episode December 2016 approximately. patient on Keppra Current Outpatient Prescriptions Medication Sig Dispense Refill  thiamine (VITAMIN B-1) 100 mg tablet Take  by mouth daily.  NIFEdipine ER (PROCARDIA XL) 90 mg ER tablet TAKE 3 TABLET BY MOUTH DAILY  Indications: hypertension 30 Tab 5  
 isosorbide mononitrate ER (IMDUR) 120 mg CR tablet Take 1 Tab by mouth every morning. 30 Tab 5  
 doxazosin (CARDURA) 1 mg tablet Take 1 Tab by mouth nightly. 30 Tab 3  
 sevelamer carbonate (RENVELA) 800 mg tab tab Take  by mouth three (3) times daily.  mupirocin calcium (BACTROBAN NASAL) 2 % nasal ointment by Both Nostrils route two (2) times a day.  allopurinol (ZYLOPRIM) 100 mg tablet TAKE 1 TABLET BY MOUTH TWICE DAILY 180 Tab 3  carvedilol (COREG) 25 mg tablet TAKE 1 TABLET BY MOUTH TWICE DAILY WITH MEALS 180 Tab 3  
 lisinopril (PRINIVIL, ZESTRIL) 40 mg tablet TAKE 1 TABLET BY MOUTH DAILY 90 Tab 1  
 levETIRAcetam (KEPPRA) 250 mg tablet TAKE 1 TABLET BY MOUTH EVERY Monday, Wednesday, Friday AFTER EACH DIALYSIS 15 Tab 0  
 levETIRAcetam (KEPPRA) 500 mg tablet TAKE 1 TABLET BY MOUTH DAILY 30 Tab 0  
 acetaminophen (TYLENOL) 500 mg tablet Take  by mouth every six (6) hours as needed for Pain.  simethicone (PHAZYME) 180 mg cap Take  by mouth.  hydrALAZINE (APRESOLINE) 100 mg tablet TAKE 1 TABLET BY MOUTH EVERY 8 HOURS 270 Tab 1  calcium acetate (PHOSLO) 667 mg cap Take 1 Cap by mouth three (3) times daily (with meals). 90 Cap 1  cloNIDine HCl (CATAPRES) 0.3 mg tablet Take 1 Tab by mouth three (3) times daily. 90 Tab 1  
 oxyCODONE-acetaminophen (PERCOCET) 5-325 mg per tablet Take 1 Tab by mouth every eight (8) hours as needed. Max Daily Amount: 3 Tabs. 20 Tab 0  
 senna-docusate (PERICOLACE) 8.6-50 mg per tablet Take 1 Tab by mouth daily. 30 Tab 3  
 aspirin delayed-release 81 mg tablet Take 1 tablet by mouth daily. 30 tablet 0  
 pantoprazole (PROTONIX) 40 mg tablet Take 1 Tab by mouth Daily (before breakfast). [Request refills from PCP (Dr. Addy Priest)] 15 Tab 0  cholecalciferol (VITAMIN D3) 1,000 unit tablet Take 2 Tabs by mouth two (2) times a day. [Request refills from PCP (Dr. Addy Priest)] 60 Tab 0  
 AMINO ACIDS/PROTEIN HYDROLYS (PRO-STAT 64 PO) Take  by mouth.  b complex-vitamin c-folic acid (NEPHROCAPS) 1 mg capsule Take 1 Cap by mouth daily. [Request refills from PCP (Dr. Addy Priest)] 30 Cap 1  
 metoprolol tartrate (LOPRESSOR) 100 mg IR tablet Take 2 Tabs by mouth every twelve (12) hours. 120 Tab 1 Orders Placed This Encounter  thiamine (VITAMIN B-1) 100 mg tablet Sig: Take  by mouth daily.  NIFEdipine ER (PROCARDIA XL) 90 mg ER tablet Sig: TAKE 3 TABLET BY MOUTH DAILY  Indications: hypertension Dispense:  30 Tab Refill:  5  
  **Patient requests 90 days supply**  
 isosorbide mononitrate ER (IMDUR) 120 mg CR tablet Sig: Take 1 Tab by mouth every morning. Dispense:  30 Tab Refill:  5  
  **Patient requests 90 days supply**  
 doxazosin (CARDURA) 1 mg tablet Sig: Take 1 Tab by mouth nightly. Dispense:  30 Tab Refill:  3 If you have questions, please do not hesitate to call me. I look forward to following  Georgia Landry along with you. Sincerely, Silverio Nj MD

## 2017-04-12 ENCOUNTER — TELEPHONE (OUTPATIENT)
Dept: CARDIOLOGY CLINIC | Age: 47
End: 2017-04-12

## 2017-04-12 RX ORDER — NIFEDIPINE 90 MG/1
90 TABLET, EXTENDED RELEASE ORAL DAILY
COMMUNITY
End: 2020-01-01

## 2017-04-12 NOTE — TELEPHONE ENCOUNTER
----- Message from Mukesh Correa MD sent at 4/12/2017  2:45 PM EDT -----  Regarding: RE: Nifedipine  It shoud be procardia xl 90 mg once daily  ----- Message -----     From: Shreya Reyes RN     Sent: 4/12/2017   1:13 PM       To: Mukesh Correa MD  Subject: Nifedipine                                       Dr. Nubia Hoover, Patient was recently seen in office and Procardia 30mg daily was increased to 90mg three times a day? Is this correct?

## 2017-04-14 ENCOUNTER — TELEPHONE (OUTPATIENT)
Dept: FAMILY MEDICINE CLINIC | Age: 47
End: 2017-04-14

## 2017-04-14 NOTE — TELEPHONE ENCOUNTER
Return call made to Magnolia Sevilla. She advised that pt needs Oxycodone for continuous pain due to Dialysis. She also stated that pt needs a nebulizer and medication for his allergies. I advised Bhavana that I would send this request to Dr Qasim Kaur but there is no guarantee on the approval since Dr Qasim Kaur has never prescribed him Oxycodone or anything for his allergies. Magnolia Sevilla asked what could pt take over the counter for his allergies. I advised Zyrtec. Magnolia Sevilla then asked in the meantime what did pt need to do about his pain. I advised that pt could come in for visit, Magnolia Sevilla stated that pt was scheduled for follow up with Dr Qasim Kaur on 4/28/17. I advised that I could schedule something sooner, Bhavana then stated that I give pt a call. I advised Bhavana that I was confused on why I needed to give him a call, she stated that because he was our pt, I advised Magnolia Sevilla that he didn't call , she did. Magnolia Sevilla stated that the pt's family has called our office several times to request these medications. 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 I advised that per the protocol in this office everything is documented. I also advised that pt's last office visit was on 3/31/17 and per documentation none of Bhavana concerns regarding the pt were discussed in that office visit.  Bhavana then requested that I have Dr Qasim Kaur call her at [phone]

## 2017-04-14 NOTE — TELEPHONE ENCOUNTER
[facility] need a refill on his Oxy[facility]one. I explained to nurse that narcotic cant be called in. Nurse understood. She also stated patient need a script for his allergies and neublizer.

## 2017-04-19 RX ORDER — LISINOPRIL 40 MG/1
TABLET ORAL
Qty: 90 TAB | Refills: 0 | Status: SHIPPED | OUTPATIENT
Start: 2017-04-19

## 2017-05-04 ENCOUNTER — OFFICE VISIT (OUTPATIENT)
Dept: FAMILY MEDICINE CLINIC | Age: 47
End: 2017-05-04

## 2017-05-04 DIAGNOSIS — I43 CARDIOMYOPATHY DUE TO HYPERTENSION, WITH HEART FAILURE (HCC): Chronic | ICD-10-CM

## 2017-05-04 DIAGNOSIS — R53.1 GENERALIZED WEAKNESS: ICD-10-CM

## 2017-05-04 DIAGNOSIS — I10 HYPERTENSION, UNCONTROLLED: Primary | Chronic | ICD-10-CM

## 2017-05-04 DIAGNOSIS — M79.10 MYALGIA: Chronic | ICD-10-CM

## 2017-05-04 DIAGNOSIS — I11.0 CARDIOMYOPATHY DUE TO HYPERTENSION, WITH HEART FAILURE (HCC): Chronic | ICD-10-CM

## 2017-05-04 RX ORDER — CYCLOBENZAPRINE HCL 10 MG
5 TABLET ORAL 2 TIMES DAILY
Qty: 30 TAB | Refills: 3 | Status: SHIPPED | OUTPATIENT
Start: 2017-05-04 | End: 2019-01-01

## 2017-05-04 NOTE — PATIENT INSTRUCTIONS

## 2017-05-04 NOTE — MR AVS SNAPSHOT
Visit Information Date & Time Provider Department Dept. Phone Encounter #  
 5/4/2017  4:30 PM Trevon Montano [phone] [id number] Your Appointments 5/11/2017 11:30 AM  
Office Visit with MD Trevon Montano [address] [facility]) Appt Note: [address] [facility] [id number]  
[facility] [address]  
  
    
 7/31/2017 10:45 AM  
ESTABLISHED PATIENT with Chad Lassiter MD  
[facility]) Appt Note: 3 months [address] [facility], [address] [facility] [address] [facility] [address] Upcoming Health Maintenance Date Due Pneumococcal 19-64 Highest Risk (1 of 3 - PCV13) 10/1/1988 DTaP/Tdap/Td series (1 - Tdap) 10/1/1990 INFLUENZA AGE 9 TO ADULT 8/1/2017 Allergies as of 5/4/2017  Review Complete On: 5/4/2017 By: Melida Watt MD  
  
 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 \" Current Immunizations  Reviewed on 6/28/2015 Name Date Influenza Vaccine (Quad) PF 9/20/2016  6:50 PM  
  
 Not reviewed this visit You Were Diagnosed With   
  
 Codes Comments Hypertension, uncontrolled    -  Primary ICD-10-CM: I10 
ICD-9-CM: 401.9 Generalized weakness     ICD-10-CM: R53.1 ICD-9-CM: 780.79 Cardiomyopathy due to hypertension, with heart failure ([facility] [address]     ICD-10-CM: I11.0 ICD-9-CM: 402.91 Myalgia     ICD-10-CM: M79.1 ICD-9-CM: 729.1 Vitals BP Pulse Temp Resp Height(growth percentile) Weight(growth percentile)  (!) 213/121 (BP 1 Location: Right arm, BP Patient Position: Sitting) 85 98.5 °F (36.9 °C) (Oral) 20 5' 11\" (1.803 m) 147 lb (66.7 kg) SpO2 BMI Smoking Status 98% 20.5 kg/m2 Never Smoker Vitals History BMI and BSA Data Body Mass Index Body Surface Area 20.5 kg/m 2 1.83 m 2 Preferred Pharmacy Pharmacy Name Phone [facility] Samra [facility] [address] [place] [address] [phone] Your Updated Medication List  
  
   
This list is accurate as of: 5/4/17  5:10 PM.  Always use your most recent med list.  
  
  
  
  
 acetaminophen 500 mg tablet Commonly known as:  TYLENOL Take  by mouth every six (6) hours as needed for Pain. allopurinol 100 mg tablet Commonly known as:  ZYLOPRIM  
TAKE 1 TABLET BY MOUTH TWICE DAILY  
  
 aspirin delayed-release 81 mg tablet Take 1 tablet by mouth daily. b complex-vitamin c-folic acid 1 mg capsule Commonly known as:  Audrea Books Take 1 Cap by mouth daily. [Request refills from PCP (Dr. Dwayne Priest)] BACTROBAN NASAL 2 % nasal ointment Generic drug:  mupirocin calcium  
by Both Nostrils route two (2) times a day. calcium acetate 667 mg Cap Commonly known as:  PHOSLO Take 1 Cap by mouth three (3) times daily (with meals). carvedilol 25 mg tablet Commonly known as:  COREG  
TAKE 1 TABLET BY MOUTH TWICE DAILY WITH MEALS  
  
 cholecalciferol 1,000 unit tablet Commonly known as:  VITAMIN D3 Take 2 Tabs by mouth two (2) times a day. [Request refills from PCP (Dr. Dwayne Priest)]  
  
 cloNIDine HCl 0.3 mg tablet Commonly known as:  CATAPRES Take 1 Tab by mouth three (3) times daily. cyclobenzaprine 10 mg tablet Commonly known as:  FLEXERIL Take 0.5 Tabs by mouth two (2) times a day. doxazosin 1 mg tablet Commonly known as:  CARDURA TAKE 1 TABLET BY MOUTH EVERY NIGHT  
  
 hydrALAZINE 100 mg tablet Commonly known as:  APRESOLINE  
TAKE 1 TABLET BY MOUTH EVERY 8 HOURS  
  
 isosorbide mononitrate  mg CR tablet Commonly known as:  IMDUR  
TAKE 1 TABLET BY MOUTH EVERY MORNING  
  
 * levETIRAcetam 250 mg tablet Commonly known as:  KEPPRA TAKE 1 TABLET BY MOUTH EVERY Monday, Wednesday, Friday AFTER EACH DIALYSIS  
  
 * levETIRAcetam 500 mg tablet Commonly known as:  KEPPRA TAKE 1 TABLET BY MOUTH DAILY  
  
 lisinopril 40 mg tablet Commonly known as:  PRINIVIL, ZESTRIL  
TAKE 1 TABLET BY MOUTH DAILY  
  
 metoprolol tartrate 100 mg IR tablet Commonly known as:  LOPRESSOR Take 2 Tabs by mouth every twelve (12) hours. NIFEdipine ER 90 mg ER tablet Commonly known as:  PROCARDIA XL Take 90 mg by mouth daily. oxyCODONE-acetaminophen 5-325 mg per tablet Commonly known as:  PERCOCET Take 1 Tab by mouth every eight (8) hours as needed. Max Daily Amount: 3 Tabs. pantoprazole 40 mg tablet Commonly known as:  PROTONIX Take 1 Tab by mouth Daily (before breakfast). [Request refills from PCP (Dr. Alessandra Priest)] PHAZYME 180 mg Cap Generic drug:  simethicone Take  by mouth. PRO-STAT 64 PO Take  by mouth. RENVELA 800 mg Tab tab Generic drug:  sevelamer carbonate Take  by mouth three (3) times daily. senna-docusate 8.6-50 mg per tablet Commonly known as:  Fredrick Mole Take 1 Tab by mouth daily. VITAMIN B-1 100 mg tablet Generic drug:  thiamine Take  by mouth daily. * Notice: This list has 2 medication(s) that are the same as other medications prescribed for you. Read the directions carefully, and ask your doctor or other care provider to review them with you. Prescriptions Sent to Pharmacy Refills  
 cyclobenzaprine (FLEXERIL) 10 mg tablet 3 Sig: Take 0.5 Tabs by mouth two (2) times a day. Class: Normal  
 Pharmacy: [facility] [address] [place] [address] Samra [address] [place] [address] #: [phone] Route: Oral  
  
Patient Instructions High Blood Pressure: Care Instructions Your Care Instructions If your blood pressure is usually above 140/90, you have high blood pressure, or hypertension. That means the top number is 140 or higher or the bottom number is 90 or higher, or both. Despite what a lot of people think, high blood pressure usually doesn't cause headaches or make you feel dizzy or lightheaded. It usually has no symptoms. But it does increase your risk for heart attack, stroke, and kidney or eye damage. The higher your blood pressure, the more your risk increases. Your doctor will give you a goal for your blood pressure. Your goal will be based on your health and your age. An example of a goal is to keep your blood pressure below 140/90. Lifestyle changes, such as eating healthy and being active, are always important to help lower blood pressure. You might also take medicine to reach your blood pressure goal. 
Follow-up care is a key part of your treatment and safety. Be sure to make and go to all appointments, and call your doctor if you are having problems. It's also a good idea to know your test results and keep a list of the medicines you take. How can you care for yourself at home? Medical treatment · If you stop taking your medicine, your blood pressure will go back up. You may take one or more types of medicine to lower your blood pressure. Be safe with medicines. Take your medicine exactly as prescribed. Call your doctor if you think you are having a problem with your medicine. · Talk to your doctor before you start taking aspirin every day. Aspirin can help certain people lower their risk of a heart attack or stroke. But taking aspirin isn't right for everyone, because it can cause serious bleeding. · See your doctor regularly. You may need to see the doctor more often at first or until your blood pressure comes down.  
· If you are taking blood pressure medicine, talk to your doctor before you take decongestants or anti-inflammatory medicine, such as ibuprofen. Some of these medicines can raise blood pressure. · Learn how to check your blood pressure at home. Lifestyle changes · Stay at a healthy weight. This is especially important if you put on weight around the waist. Losing even 10 pounds can help you lower your blood pressure. · If your doctor recommends it, get more exercise. Walking is a good choice. Bit by bit, increase the amount you walk every day. Try for at least 30 minutes on most days of the week. You also may want to swim, bike, or do other activities. · Avoid or limit alcohol. Talk to your doctor about whether you can drink any alcohol. · Try to limit how much sodium you eat to less than 2,300 milligrams (mg) a day. Your doctor may ask you to try to eat less than 1,500 mg a day. · Eat plenty of fruits (such as bananas and oranges), vegetables, legumes, whole grains, and low-fat dairy products. · Lower the amount of saturated fat in your diet. Saturated fat is found in animal products such as milk, cheese, and meat. Limiting these foods may help you lose weight and also lower your risk for heart disease. · Do not smoke. Smoking increases your risk for heart attack and stroke. If you need help quitting, talk to your doctor about stop-smoking programs and medicines. These can increase your chances of quitting for good. When should you call for help? Call 911 anytime you think you may need emergency care. This may mean having symptoms that suggest that your blood pressure is causing a serious heart or blood vessel problem. Your blood pressure may be over 180/110. For example, call 911 if: 
· You have symptoms of a heart attack. These may include: ¨ Chest pain or pressure, or a strange feeling in the chest. 
¨ Sweating. ¨ Shortness of breath. ¨ Nausea or vomiting.  
¨ Pain, pressure, or a strange feeling in the back, neck, jaw, or upper belly or in one or both shoulders or arms. ¨ Lightheadedness or sudden weakness. ¨ A fast or irregular heartbeat. · You have symptoms of a stroke. These may include: 
¨ Sudden numbness, tingling, weakness, or loss of movement in your face, arm, or leg, especially on only one side of your body. ¨ Sudden vision changes. ¨ Sudden trouble speaking. ¨ Sudden confusion or trouble understanding simple statements. ¨ Sudden problems with walking or balance. ¨ A sudden, severe headache that is different from past headaches. · You have severe back or belly pain. Do not wait until your blood pressure comes down on its own. Get help right away. Call your doctor now or seek immediate care if: 
· Your blood pressure is much higher than normal (such as 180/110 or higher), but you don't have symptoms. · You think high blood pressure is causing symptoms, such as: ¨ Severe headache. ¨ Blurry vision. Watch closely for changes in your health, and be sure to contact your doctor if: 
· Your blood pressure measures 140/90 or higher at least 2 times. That means the top number is 140 or higher or the bottom number is 90 or higher, or both. · You think you may be having side effects from your blood pressure medicine. · Your blood pressure is usually normal, but it goes above normal at least 2 times. Where can you learn more? Go to http://randa-pavel.info/. Enter L808 in the search box to learn more about \"High Blood Pressure: Care Instructions. \" Current as of: August 8, 2016 Content Version: 11.2 © 3104-2684 [employer]. Care instructions adapted under license by [employer] (which disclaims liability or warranty for this information). If you have questions about a medical condition or this instruction, always ask your healthcare professional. [address] any warranty or liability for your use of this information. Please provide this summary of care documentation to your next provider. Your primary care clinician is listed as Holley Acevedo. If you have any questions after today's visit, please call [phone].

## 2017-05-04 NOTE — PROGRESS NOTES
Chronic Illness Visit    Today's Date: 2017  Patient's Name: Justin Guthrie   Patient's :  1970     History:     Chief Complaint   Patient presents with    Follow Up Chronic Condition     pt here for follow up chronic conditions       Justin Guthrie is a 51 y.o. male presenting for a follow up of Chronic Illness. BP still elevated patient patient denies compliance with meds. Hypertension/ESRD on HD    BP today is not at goal and less [id number] slightly improved on recheck. He has not taken all of his BP meds today. Patients risk factors include: ESRD on HD is compliant w/ treatment  Patient is no checking home BP. Patient has not taken any meds today  Daily exercise and diet are as follows: reports healthy diet denies staying active  Weight gain of 10 lbs since the last visit   Takes the below meds regularly with no side effects.   Last LDL: 22        Past Medical History:   Diagnosis Date    Anemia in chronic kidney disease 2013    Cardiomyopathy due to hypertension ([address] 2013    EF 20%    Edema of lower extremity 2013    End stage renal disease ([address] 2013    HD since 2003    Hyperphosphatemia 2013    Hypertension     Hypertension, uncontrolled 2013    Personal history of atrial flutter 2013    Pulmonary hypertension ([address] 2013    Recommendation refused by patient 2013    Patient refuses dialysis (hemodialysis or peritoneal dialysis)    Secondary hyperparathyroidism of renal origin ([address] 2013    Stroke ([facility]     x2 2015    Vitamin D insufficiency 2013    Vitamin D 25-Hydroxy 20.7      Past Surgical History:   Procedure Laterality Date    HX CSF SHUNT  2016    HX HEART CATHETERIZATION  2015     Social History     Social History    Marital status: SINGLE     Spouse name: N/A    Number of children: N/A    Years of education: N/A     Social History Main Topics    Smoking status: Never Smoker    Smokeless tobacco: Never Used    Alcohol use No    Drug use: No    Sexual activity: No     Other Topics Concern    None     Social History Narrative     Family History   Problem Relation Age of Onset    Hypertension Mother     Hypertension Father     Heart Attack Neg Hx     Stroke Neg Hx      Allergies   Allergen Reactions    Amlodipine Swelling    Nuts [Tree Nut] Swelling    Pcn [Penicillins] Unknown (comments)    Phenothiazines Other (comments)     Per father, \"He has a parkinsonian reaction to this medication. \"       Problem List:      Patient Active Problem List   Diagnosis Code    End stage renal disease ([facility] N18.6    New onset seizure ([facility] R56.9    Hypertension, uncontrolled I10    Impaired mobility and ADLs Z74.09    Personal history of atrial flutter Z86.79    Pulmonary hypertension ([facility] I27.2    Cardiomyopathy due to hypertension (HCC) I11.9, I42.9    Edema of lower extremity R60.0    Anemia in chronic kidney disease N18.9, D63.1    Secondary hyperparathyroidism of renal origin ([facility] N25.81    Vitamin D insufficiency E55.9    Generalized weakness R53.1    [facility]acute renal failure) [facility] J65.0    Metabolic acidosis H35.4    Uncontrolled hypertension I10    Accelerated hypertension I10    Hypertensive emergency I16.1    Advance care planning Z71.89    Renal failure N19    Anemia D64.9    Acute renal failure (HCC) N17.9    Syncope R55    Seizure (HCC) R56.9    Non compliance w medication regimen Z91.14    Ascites R18.8       Medications:     Current Outpatient Prescriptions   Medication Sig    cyclobenzaprine (FLEXERIL) 10 mg tablet Take 0.5 Tabs by mouth two (2) times a day.  lisinopril (PRINIVIL, ZESTRIL) 40 mg tablet TAKE 1 TABLET BY MOUTH DAILY    NIFEdipine ER (PROCARDIA XL) 90 mg ER tablet Take 90 mg by mouth daily.  thiamine (VITAMIN B-1) 100 mg tablet Take  by mouth daily.     doxazosin (CARDURA) 1 mg tablet TAKE 1 TABLET BY MOUTH EVERY NIGHT    isosorbide mononitrate ER (IMDUR) 120 mg CR tablet TAKE 1 TABLET BY MOUTH EVERY MORNING    sevelamer carbonate (RENVELA) 800 mg tab tab Take  by mouth three (3) times daily.  AMINO ACIDS/PROTEIN HYDROLYS (PRO-STAT 64 PO) Take  by mouth.  mupirocin calcium (BACTROBAN NASAL) 2 % nasal ointment by Both Nostrils route two (2) times a day.  b complex-vitamin c-folic acid (NEPHROCAPS) 1 mg capsule Take 1 Cap by mouth daily. [Request refills from PCP (Dr. Addy Priest)]    allopurinol (ZYLOPRIM) 100 mg tablet TAKE 1 TABLET BY MOUTH TWICE DAILY    carvedilol (COREG) 25 mg tablet TAKE 1 TABLET BY MOUTH TWICE DAILY WITH MEALS    levETIRAcetam (KEPPRA) 250 mg tablet TAKE 1 TABLET BY MOUTH EVERY Monday, Wednesday, Friday AFTER EACH DIALYSIS    levETIRAcetam (KEPPRA) 500 mg tablet TAKE 1 TABLET BY MOUTH DAILY    acetaminophen (TYLENOL) 500 mg tablet Take  by mouth every six (6) hours as needed for Pain.  simethicone (PHAZYME) 180 mg cap Take  by mouth.  hydrALAZINE (APRESOLINE) 100 mg tablet TAKE 1 TABLET BY MOUTH EVERY 8 HOURS    calcium acetate (PHOSLO) 667 mg cap Take 1 Cap by mouth three (3) times daily (with meals).  cloNIDine HCl (CATAPRES) 0.3 mg tablet Take 1 Tab by mouth three (3) times daily.  metoprolol tartrate (LOPRESSOR) 100 mg IR tablet Take 2 Tabs by mouth every twelve (12) hours.  senna-docusate (PERICOLACE) 8.6-50 mg per tablet Take 1 Tab by mouth daily.  aspirin delayed-release 81 mg tablet Take 1 tablet by mouth daily.  pantoprazole (PROTONIX) 40 mg tablet Take 1 Tab by mouth Daily (before breakfast). [Request refills from PCP (Dr. Addy Priest)]    cholecalciferol (VITAMIN D3) 1,000 unit tablet Take 2 Tabs by mouth two (2) times a day. [Request refills from PCP (Dr. Addy Priest)]    oxyCODONE-acetaminophen (PERCOCET) 5-325 mg per tablet Take 1 Tab by mouth every eight (8) hours as needed. Max Daily Amount: 3 Tabs.      No current facility-administered medications for this visit. Constitutional: negative for fevers, chills, sweats and fatigue  Respiratory: negative for cough, sputum or wheezing  Cardiovascular: negative for chest pain, chest pressure/discomfort, dyspnea  Gastrointestinal: positive for nausea, vomiting, diarrhea negative for constipation and abdominal pain  Musculoskeletal: positive for myalgias and arthralgias  Neurological: negative for headaches and dizziness  Behavioral/Psych: negative for anxiety and depression    Physical Assessment:   VS:    Vitals:    05/04/17 1646 05/05/17 1420   BP: (!) 213/121 180/90   Pulse: 85    Resp: 20    Temp: 98.5 °F (36.9 °C)    TempSrc: Oral    SpO2: 98%    Weight: 147 lb (66.7 kg)    Height: 5' 11\" (1.803 m)              Lab Results   Component Value Date/Time    Sodium 139 03/05/2017 04:11 AM    Potassium 4.2 03/05/2017 04:11 AM    Chloride 101 03/05/2017 04:11 AM    CO2 27 03/05/2017 04:11 AM    Anion gap 11 03/05/2017 04:11 AM    Glucose 83 03/05/2017 04:11 AM    BUN 46 03/05/2017 04:11 AM    Creatinine 5.86 03/05/2017 04:11 AM    BUN/Creatinine ratio 8 03/05/2017 04:11 AM    GFR est AA 13 03/05/2017 04:11 AM    GFR est non-AA 10 03/05/2017 04:11 AM    Calcium 9.7 03/05/2017 04:11 AM        General:   Well-groomed, well-nourished, in no distress, pleasant, alert, appropriate and conversant. Mouth:  Good dentition, oropharynx WNL without membranes, exudates, petechiae or ulcers  Cardiovasc:   RRR, no MRG. Pulses 2+ and symmetric at distal extremities. Pulmonary:   Lungs clear bilaterally. Normal respiratory effort. Abdomen:   Abdomen distended, soft, NT,NAB. Protruding belly button. Extremities:   No edema, no TTP bilateral calves. LEs warm and well-perfused. Neuro:   Alert and oriented, no focal deficits. No facial asymmetry noted. Skin:    No rashes or jaundice  MSK:   Normal ROM, 5/5 muscle strength  Psych:  No pressured speech or abnormal thought content        Assessment/Plan & Orders:       1. Hypertension, uncontrolled    2. Generalized weakness    3. Cardiomyopathy due to hypertension, with heart failure ([address]    4. Myalgia        Orders Placed This Encounter    cyclobenzaprine (FLEXERIL) 10 mg tablet       Hypertension/ESRD BP is grossly uncontrolled. Will continue w/ current meds. BP grossly elevated patient has not taken meds today. Recommend he take meds and recheck BP if BP still elevated recommend he go to ED patient agreed w/ the plan. Reiterated the importance of compliance with meds.    Myalgia after HD will send in flexeril    Follow up in 2-3 months    Maxx Baires MD  Family Medicine  5/4/2017  4:37 PM

## 2017-05-05 VITALS
SYSTOLIC BLOOD PRESSURE: 180 MMHG | HEIGHT: 71 IN | DIASTOLIC BLOOD PRESSURE: 90 MMHG | HEART RATE: 85 BPM | RESPIRATION RATE: 20 BRPM | BODY MASS INDEX: 20.58 KG/M2 | OXYGEN SATURATION: 98 % | WEIGHT: 147 LBS | TEMPERATURE: 98.5 F

## 2017-05-11 ENCOUNTER — TELEPHONE (OUTPATIENT)
Dept: FAMILY MEDICINE CLINIC | Age: 47
End: 2017-05-11

## 2017-05-11 NOTE — TELEPHONE ENCOUNTER
[address]) called stating she need clarification on patient medication.  She can be reached at [phone]

## 2017-07-27 ENCOUNTER — OFFICE VISIT (OUTPATIENT)
Dept: VASCULAR SURGERY | Age: 47
End: 2017-07-27

## 2017-07-27 VITALS
WEIGHT: 147 LBS | HEIGHT: 71 IN | SYSTOLIC BLOOD PRESSURE: 144 MMHG | HEART RATE: 71 BPM | BODY MASS INDEX: 20.58 KG/M2 | DIASTOLIC BLOOD PRESSURE: 86 MMHG | RESPIRATION RATE: 16 BRPM

## 2017-07-27 DIAGNOSIS — Z99.2 ESRD (END STAGE RENAL DISEASE) ON DIALYSIS (HCC): ICD-10-CM

## 2017-07-27 DIAGNOSIS — Z49.01 FITTING AND ADJUSTMENT OF EXTRACORPOREAL DIALYSIS CATHETER (HCC): Primary | ICD-10-CM

## 2017-07-27 DIAGNOSIS — N18.6 ESRD (END STAGE RENAL DISEASE) ON DIALYSIS (HCC): ICD-10-CM

## 2017-07-27 NOTE — PROGRESS NOTES
Post Removal of Tunneled Dialysis Catheter Note    7/27/2017     Procedure(s): Tunneled catheter removed without complications. Sutures necessary: No    Findings:  Catheter site cleansed site with betadine. Local anesthesia with 8 mL 1% lidocaine. Blunt dissection of cuff. Catheter removed in its entirety. Tip was not sent for culture. Pressure dressing applied. Complications: None    Plan: Per Nephrology    Follow Up: as needed.      Signed By: [address] [place], [place] [address]GILBERTO Rush  7/27/2017  1:13 PM

## 2017-08-04 ENCOUNTER — HOSPITAL ENCOUNTER (OUTPATIENT)
Dept: LAB | Age: 47
Discharge: HOME | End: 2017-08-04
Attending: INTERNAL MEDICINE
Payer: MEDICARE

## 2017-08-04 ENCOUNTER — HOSPITAL ENCOUNTER (OUTPATIENT)
Dept: ULTRASOUND IMAGING | Age: 47
Discharge: HOME | End: 2017-08-04
Attending: INTERNAL MEDICINE
Payer: MEDICARE

## 2017-08-04 DIAGNOSIS — R18.8 ASCITES: ICD-10-CM

## 2017-08-04 LAB
ALBUMIN FLD-MCNC: 2.8 G/DL
ALBUMIN SERPL BCP-MCNC: 3.4 G/DL (ref 3.4–5)
ALBUMIN/GLOB SERPL: 0.9 {RATIO} (ref 0.8–1.7)
ALP SERPL-CCNC: 143 U/L (ref 45–117)
ALT SERPL-CCNC: 10 U/L (ref 16–61)
APPEARANCE FLD: CLEAR
APTT PPP: 39.9 SEC (ref 23–36.4)
AST SERPL W P-5'-P-CCNC: 7 U/L (ref 15–37)
BILIRUB DIRECT SERPL-MCNC: 0.2 MG/DL (ref 0–0.2)
BILIRUB SERPL-MCNC: 0.4 MG/DL (ref 0.2–1)
COLOR FLD: YELLOW
EOSINOPHIL # FLD: 1 %
GLOBULIN SER CALC-MCNC: 3.9 G/DL (ref 2–4)
INR PPP: 1.5 (ref 0.8–1.2)
LYMPHOCYTES NFR FLD: 14 %
MACROPHAGES NFR FLD: 83 %
MONOCYTES NFR FLD: 0 %
NEUTS BAND # FLD: 0 %
NEUTS SEG NFR FLD: 2 %
NUC CELL # FLD: 63 /CU MM
PROT FLD-MCNC: 5.3 G/DL
PROT SERPL-MCNC: 7.3 G/DL (ref 6.4–8.2)
PROTHROMBIN TIME: 17.9 SEC (ref 11.5–15.2)
RBC # FLD: 473 /CU MM
SPECIMEN SOURCE FLD: ABNORMAL
SPECIMEN SOURCE FLD: NORMAL
SPECIMEN SOURCE FLD: NORMAL

## 2017-08-04 PROCEDURE — 89051 BODY FLUID CELL COUNT: CPT | Performed by: INTERNAL MEDICINE

## 2017-08-04 PROCEDURE — 84157 ASSAY OF PROTEIN OTHER: CPT | Performed by: INTERNAL MEDICINE

## 2017-08-04 PROCEDURE — 80076 HEPATIC FUNCTION PANEL: CPT | Performed by: INTERNAL MEDICINE

## 2017-08-04 PROCEDURE — 36415 COLL VENOUS BLD VENIPUNCTURE: CPT | Performed by: INTERNAL MEDICINE

## 2017-08-04 PROCEDURE — 85610 PROTHROMBIN TIME: CPT | Performed by: RADIOLOGY

## 2017-08-04 PROCEDURE — 49083 ABD PARACENTESIS W/IMAGING: CPT

## 2017-08-04 PROCEDURE — 87070 CULTURE OTHR SPECIMN AEROBIC: CPT | Performed by: INTERNAL MEDICINE

## 2017-08-04 PROCEDURE — 85730 THROMBOPLASTIN TIME PARTIAL: CPT | Performed by: RADIOLOGY

## 2017-08-04 PROCEDURE — 82042 OTHER SOURCE ALBUMIN QUAN EA: CPT | Performed by: INTERNAL MEDICINE

## 2017-08-04 NOTE — PROCEDURES
RADIOLOGY POST PROCEDURE NOTE     August 4, 2017       2:41 PM     Preoperative Diagnosis:   Ascites    Postoperative Diagnosis:  Same. :  Irene Quintanilla PA-C    Assistant:  None. Type of Anesthesia: 1% plain lidocaine    Procedure/Description:  Paracentesis    Findings:   Ultrasound-guided removal of ascitic fluid. See dictated report for details. Estimated blood Loss:  Minimal    Specimen Removed:   yes    Blood transfusions:  None. Implants:  None.     Complications: None    Condition: Stable    Discharge Plan:  discharge home     Ranjeet Weldon MD

## 2017-08-09 LAB
BACTERIA SPEC CULT: NORMAL
GRAM STN SPEC: NORMAL
GRAM STN SPEC: NORMAL
SERVICE CMNT-IMP: NORMAL

## 2017-08-31 ENCOUNTER — HOSPITAL ENCOUNTER (OUTPATIENT)
Dept: ULTRASOUND IMAGING | Age: 47
Discharge: HOME | End: 2017-08-31
Attending: INTERNAL MEDICINE
Payer: MEDICARE

## 2017-08-31 DIAGNOSIS — R18.8 ASCITES: ICD-10-CM

## 2017-08-31 PROCEDURE — 49083 ABD PARACENTESIS W/IMAGING: CPT

## 2017-08-31 NOTE — PROCEDURES
RADIOLOGY POST PARACENTESIS NOTE     August 31, 2017       12:15 PM     Preoperative Diagnosis:   Ascites    Postoperative Diagnosis:  Same. :  Bin Rankin PA-C    Assistant:  None. Type of Anesthesia: 1% plain lidocaine    Procedure/Description:  US-Guided paracentesis    Findings:  Using ultrasound guidance the largest pocket of peritoneal fluid was localized and marked at the right lower quadrant. There was evidence of multiple septations throughout the ascitic fluid. The patient was prepped and draped in the usual fashion. 1% Lidocaine was infiltrated locally. A 5 Cuban over the needle catheter was advanced into the peritoneal cavity and yellow colored fluid was aspirated. Once fluid was easily aspirated, the needle was removed leaving the catheter in place. The catheter was connected to vacuum containers and only 300 cc of ascitic fluid was removed despite evidence of fluid on ultrasound. The left lower quadrant was visualized with ultrasound and other large pocket of peritoneal fluid was again localized. The patient was prepped and draped in the usual fashion. 1% Lidocaine was infiltrated locally. A 5 Cuban over the needle catheter was advanced into the peritoneal cavity and yellow colored fluid was aspirated. Once fluid was easily aspirated, the needle was removed leaving the catheter in place.  The catheter was connected to vacuum containers and 3300 cc of ascitic fluid was removed     Estimated blood Loss:  Minimal    Specimen Removed:   Yes    Complications: None    Condition: Stable    Discharge Plan:  discharge home     Jessica Fox

## 2017-09-27 ENCOUNTER — HOSPITAL ENCOUNTER (OUTPATIENT)
Dept: ULTRASOUND IMAGING | Age: 47
Discharge: HOME | End: 2017-09-27
Attending: INTERNAL MEDICINE
Payer: MEDICARE

## 2017-09-27 ENCOUNTER — HOSPITAL ENCOUNTER (OUTPATIENT)
Dept: LAB | Age: 47
Discharge: HOME | End: 2017-09-27
Attending: INTERNAL MEDICINE
Payer: MEDICARE

## 2017-09-27 DIAGNOSIS — R18.8 ASCITES: ICD-10-CM

## 2017-09-27 LAB
APTT PPP: 35.3 SEC (ref 23–36.4)
ERYTHROCYTE [DISTWIDTH] IN BLOOD BY AUTOMATED COUNT: 18.8 % (ref 11.6–14.5)
HCT VFR BLD AUTO: 35.1 % (ref 36–48)
HGB BLD-MCNC: 11 G/DL (ref 13–16)
INR PPP: 1.6 (ref 0.8–1.2)
MCH RBC QN AUTO: 26.1 PG (ref 24–34)
MCHC RBC AUTO-ENTMCNC: 31.3 G/DL (ref 31–37)
MCV RBC AUTO: 83.4 FL (ref 74–97)
PLATELET # BLD AUTO: 234 K/UL (ref 135–420)
PROTHROMBIN TIME: 18 SEC (ref 11.5–15.2)
RBC # BLD AUTO: 4.21 M/UL (ref 4.7–5.5)
WBC # BLD AUTO: 7.1 K/UL (ref 4.6–13.2)

## 2017-09-27 PROCEDURE — 49083 ABD PARACENTESIS W/IMAGING: CPT

## 2017-09-27 PROCEDURE — 85027 COMPLETE CBC AUTOMATED: CPT | Performed by: INTERNAL MEDICINE

## 2017-09-27 PROCEDURE — 85730 THROMBOPLASTIN TIME PARTIAL: CPT | Performed by: INTERNAL MEDICINE

## 2017-09-27 PROCEDURE — 85610 PROTHROMBIN TIME: CPT | Performed by: INTERNAL MEDICINE

## 2017-09-27 PROCEDURE — 36415 COLL VENOUS BLD VENIPUNCTURE: CPT | Performed by: INTERNAL MEDICINE

## 2017-09-27 NOTE — PROCEDURES
RADIOLOGY POST PARACENTESIS NOTE     September 27, 2017       3:12 PM     Preoperative Diagnosis:   Ascites    Postoperative Diagnosis:  Same. :  Lucia Guajardo PA-C    Assistant:  None. Type of Anesthesia: 1% plain lidocaine    Procedure/Description:  US-Guided paracentesis    Findings:  Using ultrasound guidance the largest pocket of peritoneal fluid was localized and marked at the right lower quadrant. The patient was prepped and draped in the usual fashion. 1% Lidocaine was infiltrated locally. A 5 Grenadian over the needle catheter was advanced into the peritoneal cavity and clear yellow colored fluid was aspirated. Once fluid was easily aspirated, the needle was removed leaving the catheter in place. The catheter was connected to vacuum containers and 2.4 liters of ascitic fluid was removed.     Estimated blood Loss:  Minimal    Specimen Removed:   Yes    Complications: None    Condition: Stable    Discharge Plan:  discharge home     Jessica Torres

## 2017-10-27 ENCOUNTER — HOSPITAL ENCOUNTER (OUTPATIENT)
Dept: LAB | Age: 47
Discharge: HOME | End: 2017-10-27
Attending: INTERNAL MEDICINE
Payer: MEDICARE

## 2017-10-27 ENCOUNTER — HOSPITAL ENCOUNTER (OUTPATIENT)
Dept: ULTRASOUND IMAGING | Age: 47
Discharge: HOME | End: 2017-10-27
Attending: INTERNAL MEDICINE
Payer: MEDICARE

## 2017-10-27 DIAGNOSIS — R18.8 ASCITES: ICD-10-CM

## 2017-10-27 LAB
APTT PPP: 35.1 SEC (ref 23–36.4)
ERYTHROCYTE [DISTWIDTH] IN BLOOD BY AUTOMATED COUNT: 20 % (ref 11.6–14.5)
HCT VFR BLD AUTO: 28.7 % (ref 36–48)
HGB BLD-MCNC: 9 G/DL (ref 13–16)
INR PPP: 1.5 (ref 0.8–1.2)
MCH RBC QN AUTO: 26.5 PG (ref 24–34)
MCHC RBC AUTO-ENTMCNC: 31.4 G/DL (ref 31–37)
MCV RBC AUTO: 84.7 FL (ref 74–97)
PLATELET # BLD AUTO: 187 K/UL (ref 135–420)
PMV BLD AUTO: 11.3 FL (ref 9.2–11.8)
PROTHROMBIN TIME: 17.5 SEC (ref 11.5–15.2)
RBC # BLD AUTO: 3.39 M/UL (ref 4.7–5.5)
WBC # BLD AUTO: 8.6 K/UL (ref 4.6–13.2)

## 2017-10-27 PROCEDURE — 85730 THROMBOPLASTIN TIME PARTIAL: CPT | Performed by: INTERNAL MEDICINE

## 2017-10-27 PROCEDURE — 36415 COLL VENOUS BLD VENIPUNCTURE: CPT | Performed by: INTERNAL MEDICINE

## 2017-10-27 PROCEDURE — 85027 COMPLETE CBC AUTOMATED: CPT | Performed by: INTERNAL MEDICINE

## 2017-10-27 PROCEDURE — 85610 PROTHROMBIN TIME: CPT | Performed by: INTERNAL MEDICINE

## 2017-10-27 PROCEDURE — 49083 ABD PARACENTESIS W/IMAGING: CPT

## 2017-10-27 NOTE — H&P
Interventional Radiology History and Physical    Patient: Wandy Borrego MRN: [id number]  SSN: [id number]    YOB: 1970  Age: 49 y.o. Sex: male      Subjective:      Wandy Borrego is a 49 y.o. male who presents for paracentesis. Past Medical History:   Diagnosis Date    Anemia in chronic kidney disease 4/24/2013    Cardiomyopathy due to hypertension ([facility] 4/24/2013    EF 20%    Edema of lower extremity 4/24/2013    End stage renal disease ([facility] 04/19/2013    HD since 9/2003    Hyperphosphatemia 4/24/2013    Hypertension     Hypertension, uncontrolled 4/24/2013    Personal history of atrial flutter 4/24/2013    [facility] 4/24/2013    Recommendation refused by patient 4/24/2013    Patient refuses dialysis (hemodialysis or peritoneal dialysis)    Secondary hyperparathyroidism of renal origin ([facility] 4/24/2013    Stroke ([facility]     x2 1/2015    Vitamin D insufficiency 4/25/2013    Vitamin D 25-Hydroxy 20.7      Past Surgical History:   Procedure Laterality Date    HX CSF SHUNT  09/2016    HX HEART CATHETERIZATION  01/2015      Family History   Problem Relation Age of Onset    Hypertension Mother     Hypertension Father     Heart Attack Neg Hx     Stroke Neg Hx      Social History   Substance Use Topics    Smoking status: Never Smoker    Smokeless tobacco: Never Used    Alcohol use No      Prior to Admission medications    Medication Sig Start Date End Date Taking? Authorizing Provider   cyclobenzaprine (FLEXERIL) 10 mg tablet Take 0.5 Tabs by mouth two (2) times a day. 5/4/17   Romeo Belle MD   lisinopril (PRINIVIL, ZESTRIL) 40 mg tablet TAKE 1 TABLET BY MOUTH DAILY 4/19/17   Romeo Belle MD   NIFEdipine ER (PROCARDIA XL) 90 mg ER tablet Take 90 mg by mouth daily. Historical Provider   thiamine (VITAMIN B-1) 100 mg tablet Take  by mouth daily.     Historical Provider   doxazosin (CARDURA) 1 mg tablet TAKE 1 TABLET BY MOUTH EVERY NIGHT 4/10/17 Genoveva Kelley MD   isosorbide mononitrate ER (IMDUR) 120 mg CR tablet TAKE 1 TABLET BY MOUTH EVERY MORNING 4/10/17   Genoveva Kelley MD   sevelamer carbonate (RENVELA) 800 mg tab tab Take  by mouth three (3) times daily. Historical Provider   AMINO ACIDS/PROTEIN HYDROLYS (PRO-STAT 64 PO) Take  by mouth. Historical Provider   mupirocin calcium (BACTROBAN NASAL) 2 % nasal ointment by Both Nostrils route two (2) times a day. Historical Provider   b complex-vitamin c-folic acid (NEPHROCAPS) 1 mg capsule Take 1 Cap by mouth daily. [Request refills from PCP (Dr. Don Priest)] 3/31/17   Sharad Rogers MD   allopurinol (ZYLOPRIM) 100 mg tablet TAKE 1 TABLET BY MOUTH TWICE DAILY 3/31/17   Sharad Rogers MD   carvedilol (COREG) 25 mg tablet TAKE 1 TABLET BY MOUTH TWICE DAILY WITH MEALS 3/31/17   Sharad Rogers MD   levETIRAcetam (KEPPRA) 250 mg tablet TAKE 1 TABLET BY MOUTH EVERY Monday, Wednesday, Friday AFTER EACH DIALYSIS 12/8/16   Julian Velasco MD   levETIRAcetam (KEPPRA) 500 mg tablet TAKE 1 TABLET BY MOUTH DAILY 12/8/16   Julian Velasco MD   acetaminophen (TYLENOL) 500 mg tablet Take  by mouth every six (6) hours as needed for Pain. Historical Provider   simethicone (PHAZYME) 180 mg cap Take  by mouth. Historical Provider   hydrALAZINE (APRESOLINE) 100 mg tablet TAKE 1 TABLET BY MOUTH EVERY 8 HOURS 11/11/16   Genoveva Kelley MD   calcium acetate (PHOSLO) 667 mg cap Take 1 Cap by mouth three (3) times daily (with meals). 9/22/16   Itzel Austin MD   cloNIDine HCl (CATAPRES) 0.3 mg tablet Take 1 Tab by mouth three (3) times daily. 9/22/16   Itzel Austin MD   metoprolol tartrate (LOPRESSOR) 100 mg IR tablet Take 2 Tabs by mouth every twelve (12) hours. 9/22/16   Itzel Austin MD   oxyCODONE-acetaminophen (PERCOCET) 5-325 mg per tablet Take 1 Tab by mouth every eight (8) hours as needed. Max Daily Amount: 3 Tabs.  9/22/16   Itzel Austin MD   senna-docusate (PERICOLACE) 8.6-50 mg per tablet Take 1 Tab by mouth daily. 3/1/16   Malu Marlow MD   aspirin delayed-release 81 mg tablet Take 1 tablet by mouth daily. 1/22/15   Amy Martinez MD   pantoprazole (PROTONIX) 40 mg tablet Take 1 Tab by mouth Daily (before breakfast). [Request refills from PCP (Dr. Randa Priest)] 5/8/13   Shanta Irizarry MD   cholecalciferol (VITAMIN D3) 1,000 unit tablet Take 2 Tabs by mouth two (2) times a day. [Request refills from PCP (Dr. Randa Priest)] 5/8/13   Shanta Irizarry MD        Allergies   Allergen Reactions    Amlodipine Swelling    Nuts Shanice [place] Nut] Swelling    Pcn [Penicillins] Unknown (comments)    Phenothiazines Other (comments)     Per father, \"He has a parkinsonian reaction to this medication. \"       Review of Systems:  Pertinent items are noted in the History of Present Illness. Objective: There were no vitals filed for this visit. Physical Exam:  GENERAL: alert, cooperative, no distress, appears stated age    Assessment:     Hospital Problems  Date Reviewed: 7/27/2017    None          Plan:     Paracentesis    Signed By: Jd Alicia MD     October 27, 2017      History and Physical reviewed; I have examined the patient and there are no pertinent changes.

## 2017-10-27 NOTE — PROCEDURES
INTERVENTIONAL RADIOLOGY PROCEDURE NOTE:    Procedure: Ultrasound-Guided Paracentesis    Attending: Jorge Alberto Culver MD    Assistant: None    Procedure:  US-guided paracentesis    Indication: Ascites    Pre-operative Diagnosis: Ascites    Post-operative Diagnosis: Same    Anesthesia: Local    Procedure Details:  - Informed consent was obtained. - Time Out was performed. - Permanent image storage performed on PACS. - Standard sterile technique. - Image-guided paracentesis with 5 Danish sheathed needle. - Please refer to full report on PACS. Specimen: Therapeutic only - fluid amount in PACS dictation           Complications:  No immediate    Estimated Blood Loss: Minimal           Impression:  Successful paracentesis.     Gary Shine MD  10/27/2017

## 2017-11-16 ENCOUNTER — HOSPITAL ENCOUNTER (OUTPATIENT)
Dept: ULTRASOUND IMAGING | Age: 47
Discharge: HOME | End: 2017-11-16
Attending: NURSE PRACTITIONER
Payer: MEDICARE

## 2017-11-16 DIAGNOSIS — R18.8 ASCITES: ICD-10-CM

## 2017-11-16 DIAGNOSIS — R16.0 ENLARGED LIVER: ICD-10-CM

## 2017-11-16 PROCEDURE — 49083 ABD PARACENTESIS W/IMAGING: CPT

## 2017-11-16 NOTE — PROCEDURES
RADIOLOGY POST PARACENTESIS NOTE     November 16, 2017       10:22 AM     Preoperative Diagnosis:   Ascites    Postoperative Diagnosis:  Same. :  Von Cole PA-C    Assistant:  None. Type of Anesthesia: 1% plain lidocaine    Procedure/Description:  US-Guided paracentesis    Findings:  Using ultrasound guidance the largest pocket of peritoneal fluid was localized and marked at the left lower quadrant. Multiple septations were noted and largest pocket was localized. The patient was prepped and draped in the usual fashion. 1% Lidocaine was infiltrated locally. A 5 Welsh over the needle catheter was advanced into the peritoneal cavity and cloudy yellow colored fluid was aspirated. Once fluid was easily aspirated, the needle was removed leaving the catheter in place. The catheter was connected to vacuum containers and 3.8 liters of ascitic fluid was removed.     Estimated blood Loss:  Minimal    Specimen Removed:   Yes    Complications: None    Condition: Stable    Discharge Plan:  discharge home     Jessica Tejada

## 2017-11-16 NOTE — H&P
OUTPATIENT HISTORY AND PHYSICAL      Today 11/16/2017     Indication/Symptoms:   Destinee Jones is a 49 y.o. male with recurrent ascites who presents for an US-guided paracentesis. Of note, patient reports increased shortness of breath in comparison to his baseline. Current Meds:    Prior to Admission medications    Medication Sig Start Date End Date Taking? Authorizing Provider   cyclobenzaprine (FLEXERIL) 10 mg tablet Take 0.5 Tabs by mouth two (2) times a day. 5/4/17   Mukesh Samaniego MD   lisinopril (PRINIVIL, ZESTRIL) 40 mg tablet TAKE 1 TABLET BY MOUTH DAILY 4/19/17   Mukesh Samaniego MD   NIFEdipine ER (PROCARDIA XL) 90 mg ER tablet Take 90 mg by mouth daily. Historical Provider   thiamine (VITAMIN B-1) 100 mg tablet Take  by mouth daily. Historical Provider   doxazosin (CARDURA) 1 mg tablet TAKE 1 TABLET BY MOUTH EVERY NIGHT 4/10/17   Viridiana Castillo MD   isosorbide mononitrate ER (IMDUR) 120 mg CR tablet TAKE 1 TABLET BY MOUTH EVERY MORNING 4/10/17   Viridiana Castillo MD   sevelamer carbonate (RENVELA) 800 mg tab tab Take  by mouth three (3) times daily. Historical Provider   AMINO ACIDS/PROTEIN HYDROLYS (PRO-STAT 64 PO) Take  by mouth. Historical Provider   mupirocin calcium (BACTROBAN NASAL) 2 % nasal ointment by Both Nostrils route two (2) times a day. Historical Provider   b complex-vitamin c-folic acid (NEPHROCAPS) 1 mg capsule Take 1 Cap by mouth daily.  [Request refills from PCP (Dr. Anitra Priest)] 3/31/17   Mukesh Samaniego MD   allopurinol (ZYLOPRIM) 100 mg tablet TAKE 1 TABLET BY MOUTH TWICE DAILY 3/31/17   Mukesh Samaniego MD   carvedilol (COREG) 25 mg tablet TAKE 1 TABLET BY MOUTH TWICE DAILY WITH MEALS 3/31/17   Mukesh Samaniego MD   levETIRAcetam (KEPPRA) 250 mg tablet TAKE 1 TABLET BY MOUTH EVERY Monday, Wednesday, Friday AFTER EACH DIALYSIS 12/8/16   Haley Rojas MD   levETIRAcetam (KEPPRA) 500 mg tablet TAKE 1 TABLET BY MOUTH DAILY 12/8/16   Haley Rojas MD acetaminophen (TYLENOL) 500 mg tablet Take  by mouth every six (6) hours as needed for Pain. Historical Provider   simethicone (PHAZYME) 180 mg cap Take  by mouth. Historical Provider   hydrALAZINE (APRESOLINE) 100 mg tablet TAKE 1 TABLET BY MOUTH EVERY 8 HOURS 11/11/16   Apolinar Haney MD   calcium acetate (PHOSLO) 667 mg cap Take 1 Cap by mouth three (3) times daily (with meals). 9/22/16   Hermilo Waterman MD   cloNIDine HCl (CATAPRES) 0.3 mg tablet Take 1 Tab by mouth three (3) times daily. 9/22/16   Hermilo Waterman MD   metoprolol tartrate (LOPRESSOR) 100 mg IR tablet Take 2 Tabs by mouth every twelve (12) hours. 9/22/16   Hermilo Waterman MD   oxyCODONE-acetaminophen (PERCOCET) 5-325 mg per tablet Take 1 Tab by mouth every eight (8) hours as needed. Max Daily Amount: 3 Tabs. 9/22/16   Hermilo Waterman MD   senna-docusate (PERICOLACE) 8.6-50 mg per tablet Take 1 Tab by mouth daily. 3/1/16   Lacy Gutiérrez MD   aspirin delayed-release 81 mg tablet Take 1 tablet by mouth daily. 1/22/15   Jesusita Lee MD   pantoprazole (PROTONIX) 40 mg tablet Take 1 Tab by mouth Daily (before breakfast). [Request refills from PCP (Dr. Milagro Priest)] 5/8/13   Jayden Lange MD   cholecalciferol (VITAMIN D3) 1,000 unit tablet Take 2 Tabs by mouth two (2) times a day. [Request refills from PCP (Dr. Milagro Priest)] 5/8/13   Jayden Lange MD       Allergies: Allergies   Allergen Reactions    Amlodipine Swelling    Nuts [Tree Nut] Swelling    Pcn [Penicillins] Unknown (comments)    Phenothiazines Other (comments)     Per father, \"He has a parkinsonian reaction to this medication. \"       Comorbid Conditions:    Past Medical History:   Diagnosis Date    Anemia in chronic kidney disease 4/24/2013    Cardiomyopathy due to hypertension ([facility] [address] 4/24/2013    EF 20%    Edema of lower extremity 4/24/2013    End stage renal disease ([facility] [address] 04/19/2013    HD since 9/2003    Hyperphosphatemia 4/24/2013    Hypertension     Hypertension, uncontrolled 4/24/2013    Personal history of atrial flutter 4/24/2013    Pulmonary hypertension ([facility] [address] 4/24/2013    Recommendation refused by patient 4/24/2013    Patient refuses dialysis (hemodialysis or peritoneal dialysis)    Secondary hyperparathyroidism of renal origin ([facility] [address] 4/24/2013    Stroke ([facility]     x2 1/2015    Vitamin D insufficiency 4/25/2013    Vitamin D 25-Hydroxy 20.7           Past Surgical History:   Procedure Laterality Date    HX CSF SHUNT  09/2016    HX HEART CATHETERIZATION  01/2015     Data:    There were no vitals taken for this visit.:  No results for input(s): PLT, PLTEXT, PLTEXT in the last 72 hours. No lab exists for component:  HCT  No results for input(s): INR, APTT in the last 72 hours. No lab exists for component: PT, INREXT    The H & P and/or progress notes and any available imaging were reviewed. The risks, indications and possible alternatives to the procedure, including doing nothing, were discussed and informed consent was obtained. Physical Exam:      Mental status:   Alert and oriented. Heart:   Regular rate. Lungs:  Normal respiratory effort. The patient is an appropriate candidate to undergo the planned procedure. Patient suggested to go to ED if shortness of breath worsens or he has difficulty breathing. Patient acknowledged recommendation.     Jessica King

## 2017-12-07 ENCOUNTER — HOSPITAL ENCOUNTER (OUTPATIENT)
Dept: ULTRASOUND IMAGING | Age: 47
Discharge: HOME | End: 2017-12-07
Attending: INTERNAL MEDICINE
Payer: MEDICARE

## 2017-12-07 ENCOUNTER — HOSPITAL ENCOUNTER (OUTPATIENT)
Dept: PREADMISSION TESTING | Age: 47
Discharge: HOME | End: 2017-12-07
Attending: INTERNAL MEDICINE
Payer: MEDICARE

## 2017-12-07 DIAGNOSIS — R18.8 OTHER ASCITES: ICD-10-CM

## 2017-12-07 LAB
ANION GAP SERPL CALC-SCNC: 10 MMOL/L (ref 3–18)
APTT PPP: 34.6 SEC (ref 23–36.4)
BUN SERPL-MCNC: 50 MG/DL (ref 7–18)
BUN/CREAT SERPL: 8 (ref 12–20)
CALCIUM SERPL-MCNC: 8.8 MG/DL (ref 8.5–10.1)
CHLORIDE SERPL-SCNC: 100 MMOL/L (ref 100–108)
CO2 SERPL-SCNC: 32 MMOL/L (ref 21–32)
CREAT SERPL-MCNC: 6.03 MG/DL (ref 0.6–1.3)
ERYTHROCYTE [DISTWIDTH] IN BLOOD BY AUTOMATED COUNT: 18.4 % (ref 11.6–14.5)
GLUCOSE SERPL-MCNC: 84 MG/DL (ref 74–99)
HCT VFR BLD AUTO: 29.6 % (ref 36–48)
HGB BLD-MCNC: 8.9 G/DL (ref 13–16)
INR PPP: 1.3 (ref 0.8–1.2)
MCH RBC QN AUTO: 27.8 PG (ref 24–34)
MCHC RBC AUTO-ENTMCNC: 30.1 G/DL (ref 31–37)
MCV RBC AUTO: 92.5 FL (ref 74–97)
PLATELET # BLD AUTO: 159 K/UL (ref 135–420)
PMV BLD AUTO: 11.3 FL (ref 9.2–11.8)
POTASSIUM SERPL-SCNC: 4.3 MMOL/L (ref 3.5–5.5)
PROTHROMBIN TIME: 15.9 SEC (ref 11.5–15.2)
RBC # BLD AUTO: 3.2 M/UL (ref 4.7–5.5)
SODIUM SERPL-SCNC: 142 MMOL/L (ref 136–145)
WBC # BLD AUTO: 4.8 K/UL (ref 4.6–13.2)

## 2017-12-07 PROCEDURE — 49083 ABD PARACENTESIS W/IMAGING: CPT

## 2017-12-07 PROCEDURE — 80048 BASIC METABOLIC PNL TOTAL CA: CPT | Performed by: PHYSICIAN ASSISTANT

## 2017-12-07 PROCEDURE — 85027 COMPLETE CBC AUTOMATED: CPT | Performed by: PHYSICIAN ASSISTANT

## 2017-12-07 PROCEDURE — 85730 THROMBOPLASTIN TIME PARTIAL: CPT | Performed by: PHYSICIAN ASSISTANT

## 2017-12-07 PROCEDURE — 36415 COLL VENOUS BLD VENIPUNCTURE: CPT | Performed by: PHYSICIAN ASSISTANT

## 2017-12-07 PROCEDURE — 85610 PROTHROMBIN TIME: CPT | Performed by: PHYSICIAN ASSISTANT

## 2017-12-07 NOTE — H&P
OUTPATIENT HISTORY AND PHYSICAL      Today 12/7/2017     Indication/Symptoms:   Destinee Jones is a 49 y.o. male with recurrent ascites who presents for an US-guided paracentesis. Current Meds:    Prior to Admission medications    Medication Sig Start Date End Date Taking? Authorizing Provider   cyclobenzaprine (FLEXERIL) 10 mg tablet Take 0.5 Tabs by mouth two (2) times a day. 5/4/17   Harrison Haas MD   lisinopril (PRINIVIL, ZESTRIL) 40 mg tablet TAKE 1 TABLET BY MOUTH DAILY 4/19/17   Harrison Haas MD   NIFEdipine ER (PROCARDIA XL) 90 mg ER tablet Take 90 mg by mouth daily. Historical Provider   thiamine (VITAMIN B-1) 100 mg tablet Take  by mouth daily. Historical Provider   doxazosin (CARDURA) 1 mg tablet TAKE 1 TABLET BY MOUTH EVERY NIGHT 4/10/17   Viridiana Castillo MD   isosorbide mononitrate ER (IMDUR) 120 mg CR tablet TAKE 1 TABLET BY MOUTH EVERY MORNING 4/10/17   Viridiana Castillo MD   sevelamer carbonate (RENVELA) 800 mg tab tab Take  by mouth three (3) times daily. Historical Provider   AMINO ACIDS/PROTEIN HYDROLYS (PRO-STAT 64 PO) Take  by mouth. Historical Provider   mupirocin calcium (BACTROBAN NASAL) 2 % nasal ointment by Both Nostrils route two (2) times a day. Historical Provider   b complex-vitamin c-folic acid (NEPHROCAPS) 1 mg capsule Take 1 Cap by mouth daily.  [Request refills from PCP (Dr. Anitra Priest)] 3/31/17   Harrison Haas MD   allopurinol (ZYLOPRIM) 100 mg tablet TAKE 1 TABLET BY MOUTH TWICE DAILY 3/31/17   Harrison Haas MD   carvedilol (COREG) 25 mg tablet TAKE 1 TABLET BY MOUTH TWICE DAILY WITH MEALS 3/31/17   Harrison Haas MD   levETIRAcetam (KEPPRA) 250 mg tablet TAKE 1 TABLET BY MOUTH EVERY Monday, Wednesday, Friday AFTER EACH DIALYSIS 12/8/16   Haley Rojas MD   levETIRAcetam (KEPPRA) 500 mg tablet TAKE 1 TABLET BY MOUTH DAILY 12/8/16   Haley Rojas MD   acetaminophen (TYLENOL) 500 mg tablet Take  by mouth every six (6) hours as needed for Pain. Historical Provider   simethicone (PHAZYME) 180 mg cap Take  by mouth. Historical Provider   hydrALAZINE (APRESOLINE) 100 mg tablet TAKE 1 TABLET BY MOUTH EVERY 8 HOURS 11/11/16   Landon Chau MD   calcium acetate (PHOSLO) 667 mg cap Take 1 Cap by mouth three (3) times daily (with meals). 9/22/16   Suzette Hall MD   cloNIDine HCl (CATAPRES) 0.3 mg tablet Take 1 Tab by mouth three (3) times daily. 9/22/16   Suzette Hall MD   metoprolol tartrate (LOPRESSOR) 100 mg IR tablet Take 2 Tabs by mouth every twelve (12) hours. 9/22/16   Suzette Hall MD   oxyCODONE-acetaminophen (PERCOCET) 5-325 mg per tablet Take 1 Tab by mouth every eight (8) hours as needed. Max Daily Amount: 3 Tabs. 9/22/16   Suzette Hall MD   senna-docusate (PERICOLACE) 8.6-50 mg per tablet Take 1 Tab by mouth daily. 3/1/16   Arianne Duenas MD   aspirin delayed-release 81 mg tablet Take 1 tablet by mouth daily. 1/22/15   Dontae Gaviria MD   pantoprazole (PROTONIX) 40 mg tablet Take 1 Tab by mouth Daily (before breakfast). [Request refills from PCP (Dr. Teodoro Priest)] 5/8/13   Liberty Boo MD   cholecalciferol (VITAMIN D3) 1,000 unit tablet Take 2 Tabs by mouth two (2) times a day. [Request refills from PCP (Dr. Valerio Record Zayda)] 5/8/13   Liberty Boo MD       Allergies: Allergies   Allergen Reactions    Amlodipine Swelling    Nuts [Tree Nut] Swelling    Pcn [Penicillins] Unknown (comments)    Phenothiazines Other (comments)     Per father, \"He has a parkinsonian reaction to this medication. \"       Comorbid Conditions:    Past Medical History:   Diagnosis Date    Anemia in chronic kidney disease 4/24/2013    Cardiomyopathy due to hypertension ([facility] [address] 4/24/2013    EF 20%    Edema of lower extremity 4/24/2013    End stage renal disease ([facility] [address] 04/19/2013    HD since 9/2003    Hyperphosphatemia 4/24/2013    Hypertension     Hypertension, uncontrolled 4/24/2013    Personal history of atrial flutter 4/24/2013    Pulmonary hypertension ([facility] [address] 4/24/2013    Recommendation refused by patient 4/24/2013    Patient refuses dialysis (hemodialysis or peritoneal dialysis)    Secondary hyperparathyroidism of renal origin ([facility] [address] 4/24/2013    Stroke ([facility]     x2 1/2015    Vitamin D insufficiency 4/25/2013    Vitamin D 25-Hydroxy 20.7           Past Surgical History:   Procedure Laterality Date    HX CSF SHUNT  09/2016    HX HEART CATHETERIZATION  01/2015     Data:    There were no vitals taken for this visit.:  Recent Labs      12/07/17   0815   PLT  159     Recent Labs      12/07/17   0815   INR  1.3*   APTT  34.6       The H & P and/or progress notes and any available imaging were reviewed. The risks, indications and possible alternatives to the procedure, including doing nothing, were discussed and informed consent was obtained. Physical Exam:      Mental status:   Alert and oriented. Heart:   Regular rate. Lungs:  Normal respiratory effort. The patient is an appropriate candidate to undergo the planned procedure and sedation.     Jessica Schulte

## 2017-12-29 ENCOUNTER — HOSPITAL ENCOUNTER (OUTPATIENT)
Dept: ULTRASOUND IMAGING | Age: 47
Discharge: HOME | End: 2017-12-29
Attending: INTERNAL MEDICINE
Payer: MEDICARE

## 2017-12-29 DIAGNOSIS — R18.8 OTHER ASCITES: ICD-10-CM

## 2017-12-29 LAB
ALBUMIN FLD-MCNC: 1.5 G/DL
APPEARANCE FLD: ABNORMAL
COLOR FLD: YELLOW
EOSINOPHIL NFR FLD MANUAL: 0 %
LYMPHOCYTES NFR FLD: 63 %
MACROPHAGES NFR FLD: 37 %
MONOCYTES NFR FLD: 0 %
NEUTROPHILS NFR FLD: 0 %
NEUTS BAND # FLD: 0 %
NUC CELL # FLD: 145 /CU MM
PROT FLD-MCNC: 3 G/DL
RBC # FLD: 15 /CU MM
SPECIMEN SOURCE FLD: ABNORMAL
SPECIMEN SOURCE FLD: NORMAL
SPECIMEN SOURCE FLD: NORMAL
WBC MORPH BLD: ABNORMAL

## 2017-12-29 PROCEDURE — 84157 ASSAY OF PROTEIN OTHER: CPT | Performed by: INTERNAL MEDICINE

## 2017-12-29 PROCEDURE — 87070 CULTURE OTHR SPECIMN AEROBIC: CPT | Performed by: INTERNAL MEDICINE

## 2017-12-29 PROCEDURE — 49083 ABD PARACENTESIS W/IMAGING: CPT

## 2017-12-29 PROCEDURE — 82042 OTHER SOURCE ALBUMIN QUAN EA: CPT | Performed by: INTERNAL MEDICINE

## 2017-12-29 PROCEDURE — 89051 BODY FLUID CELL COUNT: CPT | Performed by: INTERNAL MEDICINE

## 2017-12-29 NOTE — H&P
OUTPATIENT HISTORY AND PHYSICAL      Today 12/29/2017     Indication/Symptoms:   Rosette Alvarez is a 49 y.o. male with recurrent ascites who presents for an US-guided paracentesis. Current Meds:    Prior to Admission medications    Medication Sig Start Date End Date Taking? Authorizing Provider   cyclobenzaprine (FLEXERIL) 10 mg tablet Take 0.5 Tabs by mouth two (2) times a day. 5/4/17   Mimi Goff MD   lisinopril (PRINIVIL, ZESTRIL) 40 mg tablet TAKE 1 TABLET BY MOUTH DAILY 4/19/17   Mimi Goff MD   NIFEdipine ER (PROCARDIA XL) 90 mg ER tablet Take 90 mg by mouth daily. Historical Provider   thiamine (VITAMIN B-1) 100 mg tablet Take  by mouth daily. Historical Provider   doxazosin (CARDURA) 1 mg tablet TAKE 1 TABLET BY MOUTH EVERY NIGHT 4/10/17   Yari Benavides MD   isosorbide mononitrate ER (IMDUR) 120 mg CR tablet TAKE 1 TABLET BY MOUTH EVERY MORNING 4/10/17   Yari Benavides MD   sevelamer carbonate (RENVELA) 800 mg tab tab Take  by mouth three (3) times daily. Historical Provider   AMINO ACIDS/PROTEIN HYDROLYS (PRO-STAT 64 PO) Take  by mouth. Historical Provider   mupirocin calcium (BACTROBAN NASAL) 2 % nasal ointment by Both Nostrils route two (2) times a day. Historical Provider   b complex-vitamin c-folic acid (NEPHROCAPS) 1 mg capsule Take 1 Cap by mouth daily.  [Request refills from PCP (Dr. Rhina Priest)] 3/31/17   Mimi Goff MD   allopurinol (ZYLOPRIM) 100 mg tablet TAKE 1 TABLET BY MOUTH TWICE DAILY 3/31/17   Mimi Goff MD   carvedilol (COREG) 25 mg tablet TAKE 1 TABLET BY MOUTH TWICE DAILY WITH MEALS 3/31/17   Mimi Goff MD   levETIRAcetam (KEPPRA) 250 mg tablet TAKE 1 TABLET BY MOUTH EVERY Monday, Wednesday, Friday AFTER EACH DIALYSIS 12/8/16   Alan Carbajal MD   levETIRAcetam (KEPPRA) 500 mg tablet TAKE 1 TABLET BY MOUTH DAILY 12/8/16   Alan Carbajal MD   acetaminophen (TYLENOL) 500 mg tablet Take  by mouth every six (6) hours as needed for Pain. Historical Provider   simethicone (PHAZYME) 180 mg cap Take  by mouth. Historical Provider   hydrALAZINE (APRESOLINE) 100 mg tablet TAKE 1 TABLET BY MOUTH EVERY 8 HOURS 11/11/16   July Castañeda MD   calcium acetate (PHOSLO) 667 mg cap Take 1 Cap by mouth three (3) times daily (with meals). 9/22/16   Estelle Prabhakar MD   cloNIDine HCl (CATAPRES) 0.3 mg tablet Take 1 Tab by mouth three (3) times daily. 9/22/16   Estelle Prabhakar MD   metoprolol tartrate (LOPRESSOR) 100 mg IR tablet Take 2 Tabs by mouth every twelve (12) hours. 9/22/16   Estelle Prabhakar MD   oxyCODONE-acetaminophen (PERCOCET) 5-325 mg per tablet Take 1 Tab by mouth every eight (8) hours as needed. Max Daily Amount: 3 Tabs. 9/22/16   Estelle Prabhakar MD   senna-docusate (PERICOLACE) 8.6-50 mg per tablet Take 1 Tab by mouth daily. 3/1/16   Khang Hernandez MD   aspirin delayed-release 81 mg tablet Take 1 tablet by mouth daily. 1/22/15   Erik Jarvis MD   pantoprazole (PROTONIX) 40 mg tablet Take 1 Tab by mouth Daily (before breakfast). [Request refills from PCP (Dr. Mauri Priest)] 5/8/13   Zenon Cortez MD   cholecalciferol (VITAMIN D3) 1,000 unit tablet Take 2 Tabs by mouth two (2) times a day. [Request refills from PCP (Dr. Mauri Priest)] 5/8/13   Zenon Cortez MD       Allergies: Allergies   Allergen Reactions    Amlodipine Swelling    Nuts [Tree Nut] Swelling    Pcn [Penicillins] Unknown (comments)    Phenothiazines Other (comments)     Per father, \"He has a parkinsonian reaction to this medication. \"       Comorbid Conditions:    Past Medical History:   Diagnosis Date    Anemia in chronic kidney disease 4/24/2013    Cardiomyopathy due to hypertension ([facility] [address] 4/24/2013    EF 20%    Edema of lower extremity 4/24/2013    End stage renal disease ([facility] [address] 04/19/2013    HD since 9/2003    Hyperphosphatemia 4/24/2013    Hypertension     Hypertension, uncontrolled 4/24/2013    Personal history of atrial flutter 4/24/2013    Pulmonary hypertension ([facility] [address] 4/24/2013    Recommendation refused by patient 4/24/2013    Patient refuses dialysis (hemodialysis or peritoneal dialysis)    Secondary hyperparathyroidism of renal origin ([facility] [address] 4/24/2013    Stroke ([facility]     x2 1/2015    Vitamin D insufficiency 4/25/2013    Vitamin D 25-Hydroxy 20.7           Past Surgical History:   Procedure Laterality Date    HX CSF SHUNT  09/2016    HX HEART CATHETERIZATION  01/2015     Data:    There were no vitals taken for this visit.:  No results for input(s): PLT, PLTEXT in the last 72 hours. No lab exists for component:  HCT  No results for input(s): INR, APTT in the last 72 hours. No lab exists for component: PT, INREXT, INREXT    The H & P and/or progress notes and any available imaging were reviewed. The risks, indications and possible alternatives to the procedure, including doing nothing, were discussed and informed consent was obtained. Physical Exam:      Mental status:   Alert and oriented. Heart:   Regular rate. Lungs:  Normal respiratory effort. The patient is an appropriate candidate to undergo the planned procedure and sedation.     Jessica Smith

## 2017-12-29 NOTE — PROCEDURES
RADIOLOGY POST PARACENTESIS NOTE     December 29, 2017       1:33 PM     Preoperative Diagnosis:   Ascites    Postoperative Diagnosis:  Same. :  Wilton Dozier PA-C    Assistant:  None. Type of Anesthesia: 1% plain lidocaine    Procedure/Description:  US-Guided paracentesis    Findings:  Using ultrasound guidance the largest pocket of peritoneal fluid was localized and marked at the left lower quadrant. The patient was prepped and draped in the usual fashion. 1% Lidocaine was infiltrated locally. A 5 Sami over the needle catheter was advanced into the peritoneal cavity and cloudy yellow colored fluid was aspirated. Once fluid was easily aspirated, the needle was removed leaving the catheter in place. The catheter was connected to vacuum containers and 3.76 liters of ascitic fluid was removed.     Estimated blood Loss:  Minimal    Specimen Removed:   Yes    Complications: None    Condition: Stable    Discharge Plan:  discharge home     Jessica Amado

## 2018-01-03 LAB
BACTERIA SPEC CULT: NORMAL
GRAM STN SPEC: NORMAL
GRAM STN SPEC: NORMAL
SERVICE CMNT-IMP: NORMAL

## 2018-01-18 ENCOUNTER — HOSPITAL ENCOUNTER (OUTPATIENT)
Dept: ULTRASOUND IMAGING | Age: 48
Discharge: HOME | End: 2018-01-18
Attending: INTERNAL MEDICINE
Payer: MEDICARE

## 2018-01-18 ENCOUNTER — HOSPITAL ENCOUNTER (OUTPATIENT)
Dept: LAB | Age: 48
Discharge: HOME | End: 2018-01-18
Attending: INTERNAL MEDICINE
Payer: MEDICARE

## 2018-01-18 DIAGNOSIS — R18.8 OTHER ASCITES: ICD-10-CM

## 2018-01-18 LAB
ANION GAP SERPL CALC-SCNC: 13 MMOL/L (ref 3–18)
APTT PPP: 33.7 SEC (ref 23–36.4)
BUN SERPL-MCNC: 68 MG/DL (ref 7–18)
BUN/CREAT SERPL: 8 (ref 12–20)
CALCIUM SERPL-MCNC: 7.9 MG/DL (ref 8.5–10.1)
CHLORIDE SERPL-SCNC: 102 MMOL/L (ref 100–108)
CO2 SERPL-SCNC: 29 MMOL/L (ref 21–32)
CREAT SERPL-MCNC: 8.93 MG/DL (ref 0.6–1.3)
ERYTHROCYTE [DISTWIDTH] IN BLOOD BY AUTOMATED COUNT: 18.6 % (ref 11.6–14.5)
GLUCOSE SERPL-MCNC: 92 MG/DL (ref 74–99)
HCT VFR BLD AUTO: 33 % (ref 36–48)
HGB BLD-MCNC: 10.2 G/DL (ref 13–16)
INR PPP: 1.5 (ref 0.8–1.2)
MCH RBC QN AUTO: 27.7 PG (ref 24–34)
MCHC RBC AUTO-ENTMCNC: 30.9 G/DL (ref 31–37)
MCV RBC AUTO: 89.7 FL (ref 74–97)
PLATELET # BLD AUTO: 213 K/UL (ref 135–420)
PMV BLD AUTO: 11 FL (ref 9.2–11.8)
POTASSIUM SERPL-SCNC: 4.3 MMOL/L (ref 3.5–5.5)
PROTHROMBIN TIME: 17.6 SEC (ref 11.5–15.2)
RBC # BLD AUTO: 3.68 M/UL (ref 4.7–5.5)
SODIUM SERPL-SCNC: 144 MMOL/L (ref 136–145)
WBC # BLD AUTO: 6.5 K/UL (ref 4.6–13.2)

## 2018-01-18 PROCEDURE — 85730 THROMBOPLASTIN TIME PARTIAL: CPT | Performed by: PHYSICIAN ASSISTANT

## 2018-01-18 PROCEDURE — 85610 PROTHROMBIN TIME: CPT | Performed by: PHYSICIAN ASSISTANT

## 2018-01-18 PROCEDURE — 85027 COMPLETE CBC AUTOMATED: CPT | Performed by: PHYSICIAN ASSISTANT

## 2018-01-18 PROCEDURE — 36415 COLL VENOUS BLD VENIPUNCTURE: CPT | Performed by: PHYSICIAN ASSISTANT

## 2018-01-18 PROCEDURE — 49083 ABD PARACENTESIS W/IMAGING: CPT

## 2018-01-18 PROCEDURE — 80048 BASIC METABOLIC PNL TOTAL CA: CPT | Performed by: PHYSICIAN ASSISTANT

## 2018-01-18 NOTE — PROCEDURES
RADIOLOGY POST PARACENTESIS NOTE     January 18, 2018       2:27 PM     Preoperative Diagnosis:   Ascites    Postoperative Diagnosis:  Same. :  Jaden Mijares PA-C    Assistant:  None. Type of Anesthesia: 1% plain lidocaine    Procedure/Description:  US-Guided paracentesis    Findings:  Using ultrasound guidance the largest pocket of peritoneal fluid was localized and marked at the left lower quadrant. The patient was prepped and draped in the usual fashion. 1% Lidocaine was infiltrated locally. A 5 Mongolian over the needle catheter was advanced into the peritoneal cavity and clear yellow colored fluid was aspirated. Once fluid was easily aspirated, the needle was removed leaving the catheter in place. The catheter was connected to vacuum containers and 2.3 liters of ascitic fluid was removed.     Estimated blood Loss:  Minimal    Specimen Removed:   Yes    Complications: None    Condition: Stable    Discharge Plan:  discharge home     Jessica Walters

## 2018-02-08 ENCOUNTER — HOSPITAL ENCOUNTER (OUTPATIENT)
Dept: ULTRASOUND IMAGING | Age: 48
Discharge: HOME | End: 2018-02-08
Attending: INTERNAL MEDICINE
Payer: MEDICARE

## 2018-02-08 DIAGNOSIS — R18.8 OTHER ASCITES: ICD-10-CM

## 2018-02-08 PROCEDURE — 49083 ABD PARACENTESIS W/IMAGING: CPT

## 2018-02-08 NOTE — PROCEDURES
RADIOLOGY POST PARACENTESIS NOTE     February 8, 2018       12:14 PM     Preoperative Diagnosis:   Ascites    Postoperative Diagnosis:  Same. :  Carolina Fernandez PA-C    Assistant:  None. Type of Anesthesia: 1% plain lidocaine    Procedure/Description:  US-Guided paracentesis    Findings:  Using ultrasound guidance the largest pocket of peritoneal fluid was localized and marked at the left lower quadrant. The patient was prepped and draped in the usual fashion. 1% Lidocaine was infiltrated locally. A 5 Azerbaijani over the needle catheter was advanced into the peritoneal cavity and clear yellow colored fluid was aspirated. Once fluid was easily aspirated, the needle was removed leaving the catheter in place. The catheter was connected to vacuum containers and 3.15 liters of ascitic fluid was removed.     Estimated blood Loss:  Minimal    Specimen Removed:   Yes    Complications: None    Condition: Stable    Discharge Plan:  discharge home     Jessica Valencia

## 2018-02-08 NOTE — H&P
OUTPATIENT HISTORY AND PHYSICAL      Today 2/8/2018     Indication/Symptoms:   Therese Read is a 49 y.o. male with recurrent ascites who presents for an US-guided paracentesis. Current Meds:    Prior to Admission medications    Medication Sig Start Date End Date Taking? Authorizing Provider   cyclobenzaprine (FLEXERIL) 10 mg tablet Take 0.5 Tabs by mouth two (2) times a day. 5/4/17   Marito Liriano MD   lisinopril (PRINIVIL, ZESTRIL) 40 mg tablet TAKE 1 TABLET BY MOUTH DAILY 4/19/17   Marito Liriano MD   NIFEdipine ER (PROCARDIA XL) 90 mg ER tablet Take 90 mg by mouth daily. Historical Provider   thiamine (VITAMIN B-1) 100 mg tablet Take  by mouth daily. Historical Provider   doxazosin (CARDURA) 1 mg tablet TAKE 1 TABLET BY MOUTH EVERY NIGHT 4/10/17   Med Naik MD   isosorbide mononitrate ER (IMDUR) 120 mg CR tablet TAKE 1 TABLET BY MOUTH EVERY MORNING 4/10/17   Med Naik MD   sevelamer carbonate (RENVELA) 800 mg tab tab Take  by mouth three (3) times daily. Historical Provider   AMINO ACIDS/PROTEIN HYDROLYS (PRO-STAT 64 PO) Take  by mouth. Historical Provider   mupirocin calcium (BACTROBAN NASAL) 2 % nasal ointment by Both Nostrils route two (2) times a day. Historical Provider   b complex-vitamin c-folic acid (NEPHROCAPS) 1 mg capsule Take 1 Cap by mouth daily.  [Request refills from PCP (Dr. Loki Priest)] 3/31/17   Marito Liriano MD   allopurinol (ZYLOPRIM) 100 mg tablet TAKE 1 TABLET BY MOUTH TWICE DAILY 3/31/17   Marito Liriano MD   carvedilol (COREG) 25 mg tablet TAKE 1 TABLET BY MOUTH TWICE DAILY WITH MEALS 3/31/17   Marito Liriano MD   levETIRAcetam (KEPPRA) 250 mg tablet TAKE 1 TABLET BY MOUTH EVERY Monday, Wednesday, Friday AFTER EACH DIALYSIS 12/8/16   Fredo Cardona MD   levETIRAcetam (KEPPRA) 500 mg tablet TAKE 1 TABLET BY MOUTH DAILY 12/8/16   Fredo Cardona MD   acetaminophen (TYLENOL) 500 mg tablet Take  by mouth every six (6) hours as needed for Pain. Historical Provider   simethicone (PHAZYME) 180 mg cap Take  by mouth. Historical Provider   hydrALAZINE (APRESOLINE) 100 mg tablet TAKE 1 TABLET BY MOUTH EVERY 8 HOURS 11/11/16   Suellen Garcia MD   calcium acetate (PHOSLO) 667 mg cap Take 1 Cap by mouth three (3) times daily (with meals). 9/22/16   Aida Villarreal MD   cloNIDine HCl (CATAPRES) 0.3 mg tablet Take 1 Tab by mouth three (3) times daily. 9/22/16   Aida Villarreal MD   metoprolol tartrate (LOPRESSOR) 100 mg IR tablet Take 2 Tabs by mouth every twelve (12) hours. 9/22/16   Aida Villarreal MD   oxyCODONE-acetaminophen (PERCOCET) 5-325 mg per tablet Take 1 Tab by mouth every eight (8) hours as needed. Max Daily Amount: 3 Tabs. 9/22/16   Aida Villarreal MD   senna-docusate (PERICOLACE) 8.6-50 mg per tablet Take 1 Tab by mouth daily. 3/1/16   Shahid Go MD   aspirin delayed-release 81 mg tablet Take 1 tablet by mouth daily. 1/22/15   Tito Frost MD   pantoprazole (PROTONIX) 40 mg tablet Take 1 Tab by mouth Daily (before breakfast). [Request refills from PCP (Dr. Elly Priest)] 5/8/13   Jake Capellan MD   cholecalciferol (VITAMIN D3) 1,000 unit tablet Take 2 Tabs by mouth two (2) times a day. [Request refills from PCP (Dr. Elly Priest)] 5/8/13   Jake Capellan MD       Allergies: Allergies   Allergen Reactions    Amlodipine Swelling    Nuts [Tree Nut] Swelling    Pcn [Penicillins] Unknown (comments)    Phenothiazines Other (comments)     Per father, \"He has a parkinsonian reaction to this medication. \"       Comorbid Conditions:    Past Medical History:   Diagnosis Date    Anemia in chronic kidney disease 4/24/2013    Cardiomyopathy due to hypertension ([facility] [address] 4/24/2013    EF 20%    Edema of lower extremity 4/24/2013    End stage renal disease ([facility] [address] 04/19/2013    HD since 9/2003    Hyperphosphatemia 4/24/2013    Hypertension     Hypertension, uncontrolled 4/24/2013    Personal history of atrial flutter 4/24/2013    Pulmonary hypertension ([facility] [address] 4/24/2013    Recommendation refused by patient 4/24/2013    Patient refuses dialysis (hemodialysis or peritoneal dialysis)    Secondary hyperparathyroidism of renal origin ([facility] [address] 4/24/2013    Stroke ([facility]     x2 1/2015    Vitamin D insufficiency 4/25/2013    Vitamin D 25-Hydroxy 20.7           Past Surgical History:   Procedure Laterality Date    HX CSF SHUNT  09/2016    HX HEART CATHETERIZATION  01/2015     Data:    There were no vitals taken for this visit.:  No results for input(s): PLT, PLTEXT in the last 72 hours. No lab exists for component:  HCT  No results for input(s): INR, APTT in the last 72 hours. No lab exists for component: PT, INREXT    The H & P and/or progress notes and any available imaging were reviewed. The risks, indications and possible alternatives to the procedure, including doing nothing, were discussed and informed consent was obtained. Physical Exam:      Mental status:   Alert and oriented. Heart:   Regular rate. Lungs:  Normal respiratory effort. The patient is an appropriate candidate to undergo the planned procedure and sedation.     Charmayne Angelica, [facility]

## 2018-03-01 ENCOUNTER — HOSPITAL ENCOUNTER (OUTPATIENT)
Dept: ULTRASOUND IMAGING | Age: 48
Discharge: HOME | End: 2018-03-01
Attending: INTERNAL MEDICINE
Payer: MEDICARE

## 2018-03-01 ENCOUNTER — HOSPITAL ENCOUNTER (OUTPATIENT)
Dept: PREADMISSION TESTING | Age: 48
Discharge: HOME | End: 2018-03-01
Attending: INTERNAL MEDICINE
Payer: MEDICARE

## 2018-03-01 DIAGNOSIS — R18.8 OTHER ASCITES: ICD-10-CM

## 2018-03-01 LAB
ANION GAP SERPL CALC-SCNC: 9 MMOL/L (ref 3–18)
APTT PPP: 32.7 SEC (ref 23–36.4)
BUN SERPL-MCNC: 55 MG/DL (ref 7–18)
BUN/CREAT SERPL: 8 (ref 12–20)
CALCIUM SERPL-MCNC: 8 MG/DL (ref 8.5–10.1)
CHLORIDE SERPL-SCNC: 102 MMOL/L (ref 100–108)
CO2 SERPL-SCNC: 31 MMOL/L (ref 21–32)
CREAT SERPL-MCNC: 6.76 MG/DL (ref 0.6–1.3)
ERYTHROCYTE [DISTWIDTH] IN BLOOD BY AUTOMATED COUNT: 17.8 % (ref 11.6–14.5)
GLUCOSE SERPL-MCNC: 98 MG/DL (ref 74–99)
HCT VFR BLD AUTO: 29.9 % (ref 36–48)
HGB BLD-MCNC: 9.6 G/DL (ref 13–16)
INR PPP: 1.4 (ref 0.8–1.2)
MCH RBC QN AUTO: 28.5 PG (ref 24–34)
MCHC RBC AUTO-ENTMCNC: 32.1 G/DL (ref 31–37)
MCV RBC AUTO: 88.7 FL (ref 74–97)
PLATELET # BLD AUTO: 149 K/UL (ref 135–420)
PMV BLD AUTO: 9.6 FL (ref 9.2–11.8)
POTASSIUM SERPL-SCNC: 4.1 MMOL/L (ref 3.5–5.5)
PROTHROMBIN TIME: 16.5 SEC (ref 11.5–15.2)
RBC # BLD AUTO: 3.37 M/UL (ref 4.7–5.5)
SODIUM SERPL-SCNC: 142 MMOL/L (ref 136–145)
WBC # BLD AUTO: 6.3 K/UL (ref 4.6–13.2)

## 2018-03-01 PROCEDURE — 85027 COMPLETE CBC AUTOMATED: CPT | Performed by: INTERNAL MEDICINE

## 2018-03-01 PROCEDURE — 80048 BASIC METABOLIC PNL TOTAL CA: CPT | Performed by: INTERNAL MEDICINE

## 2018-03-01 PROCEDURE — 85610 PROTHROMBIN TIME: CPT | Performed by: INTERNAL MEDICINE

## 2018-03-01 PROCEDURE — 36415 COLL VENOUS BLD VENIPUNCTURE: CPT | Performed by: INTERNAL MEDICINE

## 2018-03-01 PROCEDURE — 85730 THROMBOPLASTIN TIME PARTIAL: CPT | Performed by: INTERNAL MEDICINE

## 2018-03-01 PROCEDURE — C1729 CATH, DRAINAGE: HCPCS

## 2018-03-01 NOTE — PROCEDURES
RADIOLOGY POST PARACENTESIS NOTE     March 1, 2018       11:28 AM     Preoperative Diagnosis:   Ascites    Postoperative Diagnosis:  Same. :  Marilou Reis PA-C    Assistant:  None. Type of Anesthesia: 1% plain lidocaine    Procedure/Description:  US-Guided paracentesis    Findings:  Using ultrasound guidance the largest pocket of peritoneal fluid was localized and marked at the left lower quadrant. The patient was prepped and draped in the usual fashion. 1% Lidocaine was infiltrated locally. A 5 Turkish over the needle catheter was advanced into the peritoneal cavity and clear yellow colored fluid was aspirated. Once fluid was easily aspirated, the needle was removed leaving the catheter in place. The catheter was connected to vacuum containers and 3.3 liters of ascitic fluid was removed.     Estimated blood Loss:  Minimal    Specimen Removed:   Yes    Complications: None    Condition: Stable    Discharge Plan:  discharge home     Marilou Reis [place]

## 2018-03-01 NOTE — H&P
OUTPATIENT HISTORY AND PHYSICAL      Today 3/1/2018     Indication/Symptoms:   Aki Sterling is a 49 y.o. male with recurrent ascites who presents for an US-guided paracentesis. Current Meds:    Prior to Admission medications    Medication Sig Start Date End Date Taking? Authorizing Provider   cyclobenzaprine (FLEXERIL) 10 mg tablet Take 0.5 Tabs by mouth two (2) times a day. 5/4/17   Fidelina Marquez MD   lisinopril (PRINIVIL, ZESTRIL) 40 mg tablet TAKE 1 TABLET BY MOUTH DAILY 4/19/17   Fidelina Marquez MD   NIFEdipine ER (PROCARDIA XL) 90 mg ER tablet Take 90 mg by mouth daily. Historical Provider   thiamine (VITAMIN B-1) 100 mg tablet Take  by mouth daily. Historical Provider   doxazosin (CARDURA) 1 mg tablet TAKE 1 TABLET BY MOUTH EVERY NIGHT 4/10/17   Yaritza Sosa MD   isosorbide mononitrate ER (IMDUR) 120 mg CR tablet TAKE 1 TABLET BY MOUTH EVERY MORNING 4/10/17   Yaritza Sosa MD   sevelamer carbonate (RENVELA) 800 mg tab tab Take  by mouth three (3) times daily. Historical Provider   AMINO ACIDS/PROTEIN HYDROLYS (PRO-STAT 64 PO) Take  by mouth. Historical Provider   mupirocin calcium (BACTROBAN NASAL) 2 % nasal ointment by Both Nostrils route two (2) times a day. Historical Provider   b complex-vitamin c-folic acid (NEPHROCAPS) 1 mg capsule Take 1 Cap by mouth daily.  [Request refills from PCP (Dr. Trevor Priest)] 3/31/17   Fidelina Marquez MD   allopurinol (ZYLOPRIM) 100 mg tablet TAKE 1 TABLET BY MOUTH TWICE DAILY 3/31/17   Fidelina Marquez MD   carvedilol (COREG) 25 mg tablet TAKE 1 TABLET BY MOUTH TWICE DAILY WITH MEALS 3/31/17   Fidelina Marquez MD   levETIRAcetam (KEPPRA) 250 mg tablet TAKE 1 TABLET BY MOUTH EVERY Monday, Wednesday, Friday AFTER EACH DIALYSIS 12/8/16   Caleb Diaz MD   levETIRAcetam (KEPPRA) 500 mg tablet TAKE 1 TABLET BY MOUTH DAILY 12/8/16   Caleb Diaz MD   acetaminophen (TYLENOL) 500 mg tablet Take  by mouth every six (6) hours as needed for Pain. Historical Provider   simethicone (PHAZYME) 180 mg cap Take  by mouth. Historical Provider   hydrALAZINE (APRESOLINE) 100 mg tablet TAKE 1 TABLET BY MOUTH EVERY 8 HOURS 11/11/16   Yaritza Sosa MD   calcium acetate (PHOSLO) 667 mg cap Take 1 Cap by mouth three (3) times daily (with meals). 9/22/16   Jerica Fabian MD   cloNIDine HCl (CATAPRES) 0.3 mg tablet Take 1 Tab by mouth three (3) times daily. 9/22/16   Jerica Fabian MD   metoprolol tartrate (LOPRESSOR) 100 mg IR tablet Take 2 Tabs by mouth every twelve (12) hours. 9/22/16   Jerica Fabian MD   oxyCODONE-acetaminophen (PERCOCET) 5-325 mg per tablet Take 1 Tab by mouth every eight (8) hours as needed. Max Daily Amount: 3 Tabs. 9/22/16   Jerica Fabian MD   senna-docusate (PERICOLACE) 8.6-50 mg per tablet Take 1 Tab by mouth daily. 3/1/16   Fidelina Marquez MD   aspirin delayed-release 81 mg tablet Take 1 tablet by mouth daily. 1/22/15   Jaziel Perales MD   pantoprazole (PROTONIX) 40 mg tablet Take 1 Tab by mouth Daily (before breakfast). [Request refills from PCP (Dr. Trevor Priest)] 5/8/13   Gianna Talley MD   cholecalciferol (VITAMIN D3) 1,000 unit tablet Take 2 Tabs by mouth two (2) times a day. [Request refills from PCP (Dr. Trevor Priest)] 5/8/13   Gianna Talley MD       Allergies: Allergies   Allergen Reactions    Amlodipine Swelling    Nuts [Tree Nut] Swelling    Pcn [Penicillins] Unknown (comments)    Phenothiazines Other (comments)     Per father, \"He has a parkinsonian reaction to this medication. \"       Comorbid Conditions:    Past Medical History:   Diagnosis Date    Anemia in chronic kidney disease 4/24/2013    Cardiomyopathy due to hypertension ([facility] 4/24/2013    EF 20%    Edema of lower extremity 4/24/2013    End stage renal disease ([facility] 04/19/2013    HD since 9/2003    Hyperphosphatemia 4/24/2013    Hypertension     Hypertension, uncontrolled 4/24/2013    Personal history of atrial flutter 4/24/2013    Pulmonary hypertension ([facility] 4/24/2013    Recommendation refused by patient 4/24/2013    Patient refuses dialysis (hemodialysis or peritoneal dialysis)    Secondary hyperparathyroidism of renal origin ([facility] 4/24/2013    Stroke ([facility]     x2 1/2015    Vitamin D insufficiency 4/25/2013    Vitamin D 25-Hydroxy 20.7           Past Surgical History:   Procedure Laterality Date    HX CSF SHUNT  09/2016    HX HEART CATHETERIZATION  01/2015     Data:    There were no vitals taken for this visit.:  Recent Labs      03/01/18   0715   PLT  149     Recent Labs      03/01/18   0715   INR  1.4*   APTT  32.7       The H & P and/or progress notes and any available imaging were reviewed. The risks, indications and possible alternatives to the procedure, including doing nothing, were discussed and informed consent was obtained. Physical Exam:      Mental status:   Alert and oriented. Heart:   Regular rate. Lungs:  Normal respiratory effort. The patient is an appropriate candidate to undergo the planned procedure and sedation.     Cristhian Espinoza, [id number]

## 2018-03-13 DIAGNOSIS — M1A.9XX0 CHRONIC GOUT, UNSPECIFIED CAUSE, UNSPECIFIED SITE: ICD-10-CM

## 2018-03-15 RX ORDER — ALLOPURINOL 100 MG/1
TABLET ORAL
Qty: 180 TAB | Refills: 0 | Status: SHIPPED | OUTPATIENT
Start: 2018-03-15

## 2018-03-22 ENCOUNTER — HOSPITAL ENCOUNTER (OUTPATIENT)
Dept: ULTRASOUND IMAGING | Age: 48
Discharge: HOME | End: 2018-03-22
Attending: INTERNAL MEDICINE
Payer: MEDICARE

## 2018-03-22 DIAGNOSIS — R18.8 OTHER ASCITES: ICD-10-CM

## 2018-03-22 PROCEDURE — C1729 CATH, DRAINAGE: HCPCS

## 2018-03-22 NOTE — PROCEDURES
INTERVENTIONAL RADIOLOGY POST PROCEDURE NOTE              Paracentesis procedure note    March 22, 2018       10:07 AM     Preoperative Diagnosis:   Ascites. Postoperative Diagnosis:  Same. Attending Physician: Dr Ruddy Szymanski    : Kojo Hernandez PA-C    Assistant:  None. Type of Anesthesia:  1% Lidocaine, local    Procedure:  Paracentesis    Description:   Using ultrasound guidance the largest pocket of peritoneal fluid was localized and marked at the Left lower quadrant. The patient was prepped and draped in the usual fashion. 1% Lidocaine was infiltrated locally. A 5 Mohawk over the needle catheter was advanced into the peritoneal cavity and clear colored fluid was aspirated. Once fluid was easily aspirated the needle was removed leaving the catheter in place. The catheter was connected to vacuum containers and 3 liters of ascitic fluid was removed. Findings:   3  Liters of ascites removed. Estimated blood Loss:  Minimal     Transfusions: None. Implants: None. Specimen Removed:   No    Drains: None.     Complications: None    Condition: Stable    Disposition: Home    Bri Carbone PA-C

## 2018-03-30 ENCOUNTER — OFFICE VISIT (OUTPATIENT)
Dept: CARDIOLOGY CLINIC | Age: 48
End: 2018-03-30

## 2018-03-30 VITALS
BODY MASS INDEX: 26.04 KG/M2 | HEIGHT: 71 IN | WEIGHT: 186 LBS | HEART RATE: 74 BPM | SYSTOLIC BLOOD PRESSURE: 174 MMHG | DIASTOLIC BLOOD PRESSURE: 108 MMHG

## 2018-03-30 DIAGNOSIS — N18.6 END STAGE RENAL DISEASE (HCC): Chronic | ICD-10-CM

## 2018-03-30 DIAGNOSIS — I11.0 CARDIOMYOPATHY DUE TO HYPERTENSION, WITH HEART FAILURE (HCC): Primary | Chronic | ICD-10-CM

## 2018-03-30 DIAGNOSIS — I10 HYPERTENSION, UNCONTROLLED: Chronic | ICD-10-CM

## 2018-03-30 DIAGNOSIS — I43 CARDIOMYOPATHY DUE TO HYPERTENSION, WITH HEART FAILURE (HCC): Primary | Chronic | ICD-10-CM

## 2018-03-30 DIAGNOSIS — I27.20 PULMONARY HYPERTENSION (HCC): Chronic | ICD-10-CM

## 2018-03-30 DIAGNOSIS — R18.8 OTHER ASCITES: ICD-10-CM

## 2018-03-30 RX ORDER — HYDRALAZINE HYDROCHLORIDE 100 MG/1
100 TABLET, FILM COATED ORAL 3 TIMES DAILY
Qty: 270 TAB | Refills: 1 | Status: SHIPPED | OUTPATIENT
Start: 2018-03-30 | End: 2019-03-12 | Stop reason: SDUPTHER

## 2018-03-30 NOTE — PATIENT INSTRUCTIONS
Medications Discontinued During This Encounter   Medication Reason    metoprolol tartrate (LOPRESSOR) 100 mg IR tablet Not A Current Medication    calcium acetate (PHOSLO) 667 mg cap Alternate Therapy    hydrALAZINE (APRESOLINE) 100 mg tablet Reorder          High Blood Pressure: Care Instructions  Your Care Instructions    If your blood pressure is usually above 140/90, you have high blood pressure, or hypertension. That means the top number is 140 or higher or the bottom number is 90 or higher, or both. Despite what a lot of people think, high blood pressure usually doesn't cause headaches or make you feel dizzy or lightheaded. It usually has no symptoms. But it does increase your risk for heart attack, stroke, and kidney or eye damage. The higher your blood pressure, the more your risk increases. Your doctor will give you a goal for your blood pressure. Your goal will be based on your health and your age. An example of a goal is to keep your blood pressure below 140/90. Lifestyle changes, such as eating healthy and being active, are always important to help lower blood pressure. You might also take medicine to reach your blood pressure goal.  Follow-up care is a key part of your treatment and safety. Be sure to make and go to all appointments, and call your doctor if you are having problems. It's also a good idea to know your test results and keep a list of the medicines you take. How can you care for yourself at home? Medical treatment  · If you stop taking your medicine, your blood pressure will go back up. You may take one or more types of medicine to lower your blood pressure. Be safe with medicines. Take your medicine exactly as prescribed. Call your doctor if you think you are having a problem with your medicine. · Talk to your doctor before you start taking aspirin every day. Aspirin can help certain people lower their risk of a heart attack or stroke.  But taking aspirin isn't right for everyone, because it can cause serious bleeding. · See your doctor regularly. You may need to see the doctor more often at first or until your blood pressure comes down. · If you are taking blood pressure medicine, talk to your doctor before you take decongestants or anti-inflammatory medicine, such as ibuprofen. Some of these medicines can raise blood pressure. · Learn how to check your blood pressure at home. Lifestyle changes  · Stay at a healthy weight. This is especially important if you put on weight around the waist. Losing even 10 pounds can help you lower your blood pressure. · If your doctor recommends it, get more exercise. Walking is a good choice. Bit by bit, increase the amount you walk every day. Try for at least 30 minutes on most days of the week. You also may want to swim, bike, or do other activities. · Avoid or limit alcohol. Talk to your doctor about whether you can drink any alcohol. · Try to limit how much sodium you eat to less than 2,300 milligrams (mg) a day. Your doctor may ask you to try to eat less than 1,500 mg a day. · Eat plenty of fruits (such as bananas and oranges), vegetables, legumes, whole grains, and low-fat dairy products. · Lower the amount of saturated fat in your diet. Saturated fat is found in animal products such as milk, cheese, and meat. Limiting these foods may help you lose weight and also lower your risk for heart disease. · Do not smoke. Smoking increases your risk for heart attack and stroke. If you need help quitting, talk to your doctor about stop-smoking programs and medicines. These can increase your chances of quitting for good. When should you call for help? Call 911 anytime you think you may need emergency care. This may mean having symptoms that suggest that your blood pressure is causing a serious heart or blood vessel problem. Your blood pressure may be over 180/110. ? For example, call 911 if:  ? · You have symptoms of a heart attack.  These may include:  ¨ Chest pain or pressure, or a strange feeling in the chest.  ¨ Sweating. ¨ Shortness of breath. ¨ Nausea or vomiting. ¨ Pain, pressure, or a strange feeling in the back, neck, jaw, or upper belly or in one or both shoulders or arms. ¨ Lightheadedness or sudden weakness. ¨ A fast or irregular heartbeat. ? · You have symptoms of a stroke. These may include:  ¨ Sudden numbness, tingling, weakness, or loss of movement in your face, arm, or leg, especially on only one side of your body. ¨ Sudden vision changes. ¨ Sudden trouble speaking. ¨ Sudden confusion or trouble understanding simple statements. ¨ Sudden problems with walking or balance. ¨ A sudden, severe headache that is different from past headaches. ? · You have severe back or belly pain. ?Do not wait until your blood pressure comes down on its own. Get help right away. ?Call your doctor now or seek immediate care if:  ? · Your blood pressure is much higher than normal (such as 180/110 or higher), but you don't have symptoms. ? · You think high blood pressure is causing symptoms, such as:  ¨ Severe headache. ¨ Blurry vision. ? Watch closely for changes in your health, and be sure to contact your doctor if:  ? · Your blood pressure measures 140/90 or higher at least 2 times. That means the top number is 140 or higher or the bottom number is 90 or higher, or both. ? · You think you may be having side effects from your blood pressure medicine. ? · Your blood pressure is usually normal, but it goes above normal at least 2 times. Where can you learn more? Go to http://randa-pavel.info/. Enter U907 in the search box to learn more about \"High Blood Pressure: Care Instructions. \"  Current as of: September 21, 2016  Content Version: 11.4  © 0610-8448 [employer]. Care instructions adapted under license by [employer] (which disclaims liability or warranty for this information).  If you have questions about a medical condition or this instruction, always ask your healthcare professional. [place] [address] any warranty or liability for your use of this information.

## 2018-03-30 NOTE — PROGRESS NOTES
HISTORY OF PRESENT ILLNESS  Mati Sun is a 49 y.o. male. Cardiomyopathy   The history is provided by the medical records (3/18 cath not done yet). Associated symptoms include shortness of breath. Pertinent negatives include no chest pain and no headaches. Hypertension   The history is provided by the medical records. This is a chronic problem. Associated symptoms include shortness of breath. Pertinent negatives include no chest pain and no headaches. Shortness of Breath   The history is provided by the patient. This is a chronic problem. The problem occurs intermittently. The current episode started more than 1 week ago. The problem has been gradually improving. Associated symptoms include leg swelling. Pertinent negatives include no fever, no headaches, no cough, no wheezing, no PND, no orthopnea, no chest pain, no vomiting, no rash and no claudication. The problem's precipitants include exercise (walking in the house). Leg Swelling   The history is provided by the patient. This is a chronic problem. The current episode started more than 1 week ago. The problem occurs every several days. The problem has not changed since onset. Associated symptoms include shortness of breath. Pertinent negatives include no chest pain and no headaches. The symptoms are aggravated by standing. The symptoms are relieved by sleep (HD). Review of Systems   Constitutional: Negative for chills, fever, malaise/fatigue and weight loss. HENT: Negative for nosebleeds. Eyes: Negative for discharge. Respiratory: Positive for shortness of breath. Negative for cough and wheezing. Cardiovascular: Positive for leg swelling. Negative for chest pain, palpitations, orthopnea, claudication and PND. Gastrointestinal: Negative for diarrhea, nausea and vomiting. Genitourinary: Negative for dysuria and hematuria. Musculoskeletal: Negative for joint pain. Skin: Negative for rash.    Neurological: Negative for dizziness, seizures, loss of consciousness and headaches. Endo/Heme/Allergies: Negative for polydipsia. Does not bruise/bleed easily. Psychiatric/Behavioral: Negative for depression and substance abuse. The patient does not have insomnia. Allergies   Allergen Reactions    Amlodipine Swelling    Nuts [Tree Nut] Swelling    Pcn [Penicillins] Unknown (comments)    Phenothiazines Other (comments)     Per father, \"He has a parkinsonian reaction to this medication. \"       Past Medical History:   Diagnosis Date    Anemia in chronic kidney disease 4/24/2013    Cardiomyopathy due to hypertension ([facility] [address] 4/24/2013    EF 20%    Edema of lower extremity 4/24/2013    [facility] 04/19/2013    HD since 9/2003    Hyperphosphatemia 4/24/2013    Hypertension     Hypertension, uncontrolled 4/24/2013    Personal history of atrial flutter 4/24/2013    [facility] 4/24/2013    Recommendation refused by patient 4/24/2013    Patient refuses dialysis (hemodialysis or peritoneal dialysis)    Secondary hyperparathyroidism of renal origin ([facility] 4/24/2013    Stroke ([facility]     x2 1/2015    Vitamin D insufficiency 4/25/2013    Vitamin D 25-Hydroxy 20.7        Family History   Problem Relation Age of Onset    Hypertension Mother     Hypertension Father     Heart Attack Neg Hx     Stroke Neg Hx        Social History   Substance Use Topics    Smoking status: Never Smoker    Smokeless tobacco: Never Used    Alcohol use No        Current Outpatient Prescriptions   Medication Sig    allopurinol (ZYLOPRIM) 100 mg tablet TAKE 1 TABLET BY MOUTH TWICE DAILY    cyclobenzaprine (FLEXERIL) 10 mg tablet Take 0.5 Tabs by mouth two (2) times a day.  lisinopril (PRINIVIL, ZESTRIL) 40 mg tablet TAKE 1 TABLET BY MOUTH DAILY    NIFEdipine ER (PROCARDIA XL) 90 mg ER tablet Take 90 mg by mouth daily.  thiamine (VITAMIN B-1) 100 mg tablet Take  by mouth daily.     doxazosin (CARDURA) 1 mg tablet TAKE 1 TABLET BY MOUTH EVERY NIGHT    isosorbide mononitrate ER (IMDUR) 120 mg CR tablet TAKE 1 TABLET BY MOUTH EVERY MORNING    sevelamer carbonate (RENVELA) 800 mg tab tab Take  by mouth three (3) times daily.  AMINO ACIDS/PROTEIN HYDROLYS (PRO-STAT 64 PO) Take  by mouth.  mupirocin calcium (BACTROBAN NASAL) 2 % nasal ointment by Both Nostrils route two (2) times a day.  b complex-vitamin c-folic acid (NEPHROCAPS) 1 mg capsule Take 1 Cap by mouth daily. [Request refills from PCP (Dr. Tessa Priest)]    carvedilol (COREG) 25 mg tablet TAKE 1 TABLET BY MOUTH TWICE DAILY WITH MEALS    levETIRAcetam (KEPPRA) 250 mg tablet TAKE 1 TABLET BY MOUTH EVERY Monday, Wednesday, Friday AFTER EACH DIALYSIS    levETIRAcetam (KEPPRA) 500 mg tablet TAKE 1 TABLET BY MOUTH DAILY    acetaminophen (TYLENOL) 500 mg tablet Take  by mouth every six (6) hours as needed for Pain.  simethicone (PHAZYME) 180 mg cap Take  by mouth.  hydrALAZINE (APRESOLINE) 100 mg tablet TAKE 1 TABLET BY MOUTH EVERY 8 HOURS    cloNIDine HCl (CATAPRES) 0.3 mg tablet Take 1 Tab by mouth three (3) times daily.  oxyCODONE-acetaminophen (PERCOCET) 5-325 mg per tablet Take 1 Tab by mouth every eight (8) hours as needed. Max Daily Amount: 3 Tabs.  senna-docusate (PERICOLACE) 8.6-50 mg per tablet Take 1 Tab by mouth daily.  aspirin delayed-release 81 mg tablet Take 1 tablet by mouth daily.  pantoprazole (PROTONIX) 40 mg tablet Take 1 Tab by mouth Daily (before breakfast). [Request refills from PCP (Dr. Tessa Priest)]    cholecalciferol (VITAMIN D3) 1,000 unit tablet Take 2 Tabs by mouth two (2) times a day. [Request refills from PCP (Dr. Tessa Priest)]    calcium acetate (PHOSLO) 667 mg cap Take 1 Cap by mouth three (3) times daily (with meals).  metoprolol tartrate (LOPRESSOR) 100 mg IR tablet Take 2 Tabs by mouth every twelve (12) hours. No current facility-administered medications for this visit.          Past Surgical History:   Procedure Laterality Date    HX CSF SHUNT  09/2016    HX HEART CATHETERIZATION  01/2015       Visit Vitals    BP (!) 174/108    Pulse 74    Ht 5' 11\" (1.803 m)    Wt 186 lb (84.4 kg)    BMI 25.94 kg/m2       Diagnostic Studies:  I have reviewed the relevant tests done on the patient and show as follows  EKG tracings reviewed by me today. No flowsheet data found. Mr. Jassi Boo has a reminder for a \"due or due soon\" health maintenance. I have asked that he contact his primary care provider for follow-up on this health maintenance. 12/16 ECHO  SUMMARY:  Left ventricle: Systolic function was moderately to markedly reduced by EF  (biplane method of disks). Ejection fraction was estimated in the range of  35 % to 40 %. There was moderate diffuse hypokinesis. Wall thickness was  markedly increased. Features were consistent with a pseudonormal left  ventricular filling pattern, with concomitant abnormal relaxation and  increased filling pressure (grade 2 diastolic dysfunction). Right ventricle: Systolic pressure was markedly increased. Estimated peak  pressure was at least 70 mmHg. Left atrium: The atrium was severely dilated. Right atrium: The atrium was dilated. Tricuspid valve: There was mild to moderate regurgitation. COMPARISONS:  There has been no significant change. Comparison was made with the  previous study of 27-Jun-2015. Physical Exam   Constitutional: He is oriented to person, place, and time. He appears well-developed and well-nourished. No distress. HENT:   Head: Normocephalic and atraumatic. Mouth/Throat: Normal dentition. Eyes: Right eye exhibits no discharge. Left eye exhibits no discharge. No scleral icterus. Neck: Neck supple. No JVD present. Carotid bruit is not present. No thyromegaly present. Cardiovascular: Normal rate, regular rhythm, S1 normal, S2 normal, normal heart sounds and intact distal pulses.   Exam reveals no gallop and no friction rub. No murmur heard. Pulmonary/Chest: Effort normal and breath sounds normal. He has no wheezes. He has no rales. Abdominal: Soft. He exhibits no mass. There is no tenderness. +ascites   Musculoskeletal: He exhibits edema (2+). Lymphadenopathy:        Right cervical: No superficial cervical adenopathy present. Left cervical: No superficial cervical adenopathy present. Neurological: He is alert and oriented to person, place, and time. Skin: Skin is warm and dry. No rash noted. Psychiatric: He has a normal mood and affect. His behavior is normal.       ASSESSMENT and PLAN        Diagnoses and all orders for this visit:    1. Cardiomyopathy due to hypertension, with heart failure ([facility] [address]  Comments:  3/18 NYHA3; 4/17 EF was 35-40% in December 2016  EF 20%  Orders:  -     2D ECHO COMPLETE ADULT (TTE); Future  -     SCHEDULE NUCLEAR STUDY    2. Hypertension, uncontrolled  Comments:  3/18; 11/16 needs more aggressive HD  d/w pt in detail; try minoxidil if BP high when no edema  Orders:  -     hydrALAZINE (APRESOLINE) 100 mg tablet; Take 1 Tab by mouth three (3) times daily. 3. End stage renal disease ([facility] [address]    4. Pulmonary hypertension  Comments:  6/16 [id number]    5. Other ascites  Comments:  3/18 paracentesis q 3-4 wks; Pertinent laboratory and test data reviewed and discussed with patient.   See patient instructions also for other medical advice given    Medications Discontinued During This Encounter   Medication Reason    metoprolol tartrate (LOPRESSOR) 100 mg IR tablet Not A Current Medication    calcium acetate (PHOSLO) 667 mg cap Alternate Therapy    hydrALAZINE (APRESOLINE) 100 mg tablet Reorder       Follow-up Disposition:  Return in about 6 weeks (around 5/11/2018), or if symptoms worsen or fail to improve, for post test.

## 2018-03-30 NOTE — PROGRESS NOTES
Patient didn't bring medications, verbally reviewed    1. Have you been to the ER, urgent care clinic since your last visit? Hospitalized since your last visit? No    2. Have you seen or consulted any other health care providers outside of the [address] [place] [address] since your last visit? Include any pap smears or colon screening. Yes Where: PCP Routine     3. Since your last visit, have you had any of the following symptoms?      palpitations, shortness of breath, dizziness and swelling in legs/arms. 4.  Have you had any blood work, X-rays or cardiac testing? Yes Where: PCP      Requested: NO     In [facility]: YES    5. Where do you normally have your labs drawn? [address]    6. Do you need any refills today?    Yes

## 2018-03-30 NOTE — MR AVS SNAPSHOT
[address] [facility] 
 
 
 [address] [facility], [address] [facility] [address] 
[phone] Patient: Vince Hawk MRN: [id number] :1970 Visit Information Date & Time Provider Department Dept. Phone Encounter #  
 3/30/2018  8:00 AM Eileen Esquivel MD [facility] [address] [place] [address] [id number] Follow-up Instructions Return in about 6 weeks (around 2018), or if symptoms worsen or fail to improve, for post test.  
  
Your Appointments 4/10/2018  8:30 AM  
PROCEDURE with [facility] ([facility]) Appt Note: jose/lynch wt 186  
 [address] [facility] [address]  
  
   
 [address] [address] [address] [address]  
  
    
 2018  8:45 AM  
Office Visit with Eileen Esquivel MD  
[facility]) Appt Note: post nuc [address] [facility] [facility], [address] [facility] [address]  
[address], [address] [address] Upcoming Health Maintenance Date Due Pneumococcal 19-64 Highest Risk (1 of 3 - PCV13) 10/1/1988 DTaP/Tdap/Td series (1 - Tdap) 10/1/1990 Influenza Age 5 to Adult 2017 MEDICARE YEARLY EXAM 3/14/2018 Allergies as of 3/30/2018  Review Complete On: 3/30/2018 By: Eileen Esquivel MD  
  
 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 \" Current Immunizations  Reviewed on 2015 Name Date Influenza Vaccine (Quad) PF 2016  6:50 PM  
  
 Not reviewed this visit You Were Diagnosed With   
  
 Codes Comments  Cardiomyopathy due to hypertension, with heart failure ([address]    -  Primary ICD-10-CM: I11.0, I43 
 ICD-9-CM: 402.91, 425.8 3/18 NYHA3; 4/17 EF was 35-40% in December 2016 EF 20% Hypertension, uncontrolled     ICD-10-CM: I10 
ICD-9-CM: 401.9 3/18; 11/16 needs more aggressive HD 
d/w pt in detail; try minoxidil if BP high when no edema End stage renal disease ([facility] [address]     ICD-10-CM: N18.6 ICD-9-CM: 585.6 Pulmonary hypertension     ICD-10-CM: I27.20 ICD-9-CM: 416.8 6/16 HBV32-83 Other ascites     ICD-10-CM: R18.8 ICD-9-CM: 789.59 3/18 paracentesis q 3-4 wks;   
  
Vitals BP Pulse Height(growth percentile) Weight(growth percentile) BMI Smoking Status (!) 174/108 74 5' 11\" (1.803 m) 186 lb (84.4 kg) 25.94 kg/m2 Never Smoker Vitals History BMI and BSA Data Body Mass Index Body Surface Area  
 25.94 kg/m 2 2.06 m 2 Preferred Pharmacy Pharmacy Name Phone [facility] [address] [phone] Your Updated Medication List  
  
   
This list is accurate as of 3/30/18  8:44 AM.  Always use your most recent med list.  
  
  
  
  
 acetaminophen 500 mg tablet Commonly known as:  TYLENOL Take  by mouth every six (6) hours as needed for Pain. allopurinol 100 mg tablet Commonly known as:  ZYLOPRIM  
TAKE 1 TABLET BY MOUTH TWICE DAILY  
  
 aspirin delayed-release 81 mg tablet Take 1 tablet by mouth daily. b complex-vitamin c-folic acid 1 mg capsule Commonly known as:  Naaman Single Take 1 Cap by mouth daily. [Request refills from PCP (Dr. Jolly Priest)] BACTROBAN NASAL 2 % nasal ointment Generic drug:  mupirocin calcium  
by Both Nostrils route two (2) times a day. carvedilol 25 mg tablet Commonly known as:  COREG  
TAKE 1 TABLET BY MOUTH TWICE DAILY WITH MEALS  
  
 cholecalciferol 1,000 unit tablet Commonly known as:  VITAMIN D3 Take 2 Tabs by mouth two (2) times a day. [Request refills from PCP (Dr. Jolly Priest)]  
  
 cloNIDine HCl 0.3 mg tablet Commonly known as:  CATAPRES Take 1 Tab by mouth three (3) times daily. cyclobenzaprine 10 mg tablet Commonly known as:  FLEXERIL Take 0.5 Tabs by mouth two (2) times a day. doxazosin 1 mg tablet Commonly known as:  CARDURA TAKE 1 TABLET BY MOUTH EVERY NIGHT  
  
 hydrALAZINE 100 mg tablet Commonly known as:  APRESOLINE Take 1 Tab by mouth three (3) times daily. isosorbide mononitrate  mg CR tablet Commonly known as:  IMDUR  
TAKE 1 TABLET BY MOUTH EVERY MORNING  
  
 * levETIRAcetam 250 mg tablet Commonly known as:  KEPPRA TAKE 1 TABLET BY MOUTH EVERY Monday, Wednesday, Friday AFTER EACH DIALYSIS  
  
 * levETIRAcetam 500 mg tablet Commonly known as:  KEPPRA TAKE 1 TABLET BY MOUTH DAILY  
  
 lisinopril 40 mg tablet Commonly known as:  PRINIVIL, ZESTRIL  
TAKE 1 TABLET BY MOUTH DAILY  
  
 NIFEdipine ER 90 mg ER tablet Commonly known as:  PROCARDIA XL Take 90 mg by mouth daily. oxyCODONE-acetaminophen 5-325 mg per tablet Commonly known as:  PERCOCET Take 1 Tab by mouth every eight (8) hours as needed. Max Daily Amount: 3 Tabs. pantoprazole 40 mg tablet Commonly known as:  PROTONIX Take 1 Tab by mouth Daily (before breakfast). [Request refills from PCP (Dr. Samantha Priest)] PHAZYME 180 mg Cap Generic drug:  simethicone Take  by mouth. PRO-STAT 64 PO Take  by mouth. RENVELA 800 mg Tab tab Generic drug:  sevelamer carbonate Take  by mouth three (3) times daily. senna-docusate 8.6-50 mg per tablet Commonly known as:  Ni Given Take 1 Tab by mouth daily. VITAMIN B-1 100 mg tablet Generic drug:  thiamine Take  by mouth daily. * Notice: This list has 2 medication(s) that are the same as other medications prescribed for you. Read the directions carefully, and ask your doctor or other care provider to review them with you. Prescriptions Sent to Pharmacy Refills hydrALAZINE (APRESOLINE) 100 mg tablet 1 Sig: Take 1 Tab by mouth three (3) times daily. Class: Normal  
 Pharmacy: [facility] [address] [place] [address] Samra Stanford [address] [place] [address] #: [phone] Route: Oral  
  
We Performed the Following SCHEDULE NUCLEAR STUDY [[id number] Custom] Comments:  
 pharmacologic Follow-up Instructions Return in about 6 weeks (around 5/11/2018), or if symptoms worsen or fail to improve, for post test.  
  
To-Do List   
 Around 04/02/2018 Cardiac Services:  2D ECHO COMPLETE ADULT (TTE)   
  
 04/12/2018 9:00 AM  
  Appointment with [address] [place] [address] 2 [place]; [address] 2 at [address] [facility] ([phone]) OUTSIDE FILMS  - Any outside films related to the study being scheduled should be brought with you on the day of the exam.  If this cannot be done there may be a delay in the reading of the study. MEDICATIONS  - Patient must bring a complete list of all medications currently taking to include prescriptions, over-the-counter meds, herbals, vitamins & any dietary supplements 05/03/2018 9:00 AM  
  Appointment with [facility]; [address] at [address] [facility] ([phone]) OUTSIDE FILMS  - Any outside films related to the study being scheduled should be brought with you on the day of the exam.  If this cannot be done there may be a delay in the reading of the study. MEDICATIONS  - Patient must bring a complete list of all medications currently taking to include prescriptions, over-the-counter meds, herbals, vitamins & any dietary supplements 05/24/2018 9:00 AM  
  Appointment with [facility]; [address] at [address] [facility] ([phone]) OUTSIDE FILMS  - Any outside films related to the study being scheduled should be brought with you on the day of the exam.  If this cannot be done there may be a delay in the reading of the study.   MEDICATIONS  - Patient must bring a complete list of all medications currently taking to include prescriptions, over-the-counter meds, herbals, vitamins & any dietary supplements 06/14/2018 9:00 AM  
  Appointment with [facility]; [address] at [address] [place] [address] ([phone]) OUTSIDE FILMS  - Any outside films related to the study being scheduled should be brought with you on the day of the exam.  If this cannot be done there may be a delay in the reading of the study. MEDICATIONS  - Patient must bring a complete list of all medications currently taking to include prescriptions, over-the-counter meds, herbals, vitamins & any dietary supplements 07/05/2018 9:00 AM  
  Appointment with [facility] RADI[facility]; [address] at [address] [place] [address] ([phone]) OUTSIDE FILMS  - Any outside films related to the study being scheduled should be brought with you on the day of the exam.  If this cannot be done there may be a delay in the reading of the study. MEDICATIONS  - Patient must bring a complete list of all medications currently taking to include prescriptions, over-the-counter meds, herbals, vitamins & any dietary supplements 07/26/2018 9:00 AM  
  Appointment with [facility] RADIOLOG[facility]; [address] at [address] [place] [address] ([phone]) OUTSIDE FILMS  - Any outside films related to the study being scheduled should be brought with you on the day of the exam.  If this cannot be done there may be a delay in the reading of the study. MEDICATIONS  - Patient must bring a complete list of all medications currently taking to include prescriptions, over-the-counter meds, herbals, vitamins & any dietary supplements Patient Instructions Medications Discontinued During This Encounter Medication Reason  metoprolol tartrate (LOPRESSOR) 100 mg IR tablet Not A Current Medication  calcium acetate (PHOSLO) 667 mg cap Alternate Therapy  hydrALAZINE (APRESOLINE) 100 mg tablet Reorder High Blood Pressure: Care Instructions Your Care Instructions If your blood pressure is usually above 140/90, you have high blood pressure, or hypertension. That means the top number is 140 or higher or the bottom number is 90 or higher, or both. Despite what a lot of people think, high blood pressure usually doesn't cause headaches or make you feel dizzy or lightheaded. It usually has no symptoms. But it does increase your risk for heart attack, stroke, and kidney or eye damage. The higher your blood pressure, the more your risk increases. Your doctor will give you a goal for your blood pressure. Your goal will be based on your health and your age. An example of a goal is to keep your blood pressure below 140/90. Lifestyle changes, such as eating healthy and being active, are always important to help lower blood pressure. You might also take medicine to reach your blood pressure goal. 
Follow-up care is a key part of your treatment and safety. Be sure to make and go to all appointments, and call your doctor if you are having problems. It's also a good idea to know your test results and keep a list of the medicines you take. How can you care for yourself at home? Medical treatment · If you stop taking your medicine, your blood pressure will go back up. You may take one or more types of medicine to lower your blood pressure. Be safe with medicines. Take your medicine exactly as prescribed. Call your doctor if you think you are having a problem with your medicine. · Talk to your doctor before you start taking aspirin every day. Aspirin can help certain people lower their risk of a heart attack or stroke. But taking aspirin isn't right for everyone, because it can cause serious bleeding. · See your doctor regularly. You may need to see the doctor more often at first or until your blood pressure comes down.  
· If you are taking blood pressure medicine, talk to your doctor before you take decongestants or anti-inflammatory medicine, such as ibuprofen. Some of these medicines can raise blood pressure. · Learn how to check your blood pressure at home. Lifestyle changes · Stay at a healthy weight. This is especially important if you put on weight around the waist. Losing even 10 pounds can help you lower your blood pressure. · If your doctor recommends it, get more exercise. Walking is a good choice. Bit by bit, increase the amount you walk every day. Try for at least 30 minutes on most days of the week. You also may want to swim, bike, or do other activities. · Avoid or limit alcohol. Talk to your doctor about whether you can drink any alcohol. · Try to limit how much sodium you eat to less than 2,300 milligrams (mg) a day. Your doctor may ask you to try to eat less than 1,500 mg a day. · Eat plenty of fruits (such as bananas and oranges), vegetables, legumes, whole grains, and low-fat dairy products. · Lower the amount of saturated fat in your diet. Saturated fat is found in animal products such as milk, cheese, and meat. Limiting these foods may help you lose weight and also lower your risk for heart disease. · Do not smoke. Smoking increases your risk for heart attack and stroke. If you need help quitting, talk to your doctor about stop-smoking programs and medicines. These can increase your chances of quitting for good. When should you call for help? Call 911 anytime you think you may need emergency care. This may mean having symptoms that suggest that your blood pressure is causing a serious heart or blood vessel problem. Your blood pressure may be over 180/110. ? For example, call 911 if: 
? · You have symptoms of a heart attack. These may include: ¨ Chest pain or pressure, or a strange feeling in the chest. 
¨ Sweating. ¨ Shortness of breath. ¨ Nausea or vomiting.  
¨ Pain, pressure, or a strange feeling in the back, neck, jaw, or upper belly or in one or both shoulders or arms. ¨ Lightheadedness or sudden weakness. ¨ A fast or irregular heartbeat. ? · You have symptoms of a stroke. These may include: 
¨ Sudden numbness, tingling, weakness, or loss of movement in your face, arm, or leg, especially on only one side of your body. ¨ Sudden vision changes. ¨ Sudden trouble speaking. ¨ Sudden confusion or trouble understanding simple statements. ¨ Sudden problems with walking or balance. ¨ A sudden, severe headache that is different from past headaches. ? · You have severe back or belly pain. ?Do not wait until your blood pressure comes down on its own. Get help right away. ?Call your doctor now or seek immediate care if: 
? · Your blood pressure is much higher than normal (such as 180/110 or higher), but you don't have symptoms. ? · You think high blood pressure is causing symptoms, such as: ¨ Severe headache. ¨ Blurry vision. ? Watch closely for changes in your health, and be sure to contact your doctor if: 
? · Your blood pressure measures 140/90 or higher at least 2 times. That means the top number is 140 or higher or the bottom number is 90 or higher, or both. ? · You think you may be having side effects from your blood pressure medicine. ? · Your blood pressure is usually normal, but it goes above normal at least 2 times. Where can you learn more? Go to http://randa-pavel.info/. Enter U690 in the search box to learn more about \"High Blood Pressure: Care Instructions. \" Current as of: September 21, 2016 Content Version: 11.4 © 3651-6576 [employer]. Care instructions adapted under license by [employer] (which disclaims liability or warranty for this information). If you have questions about a medical condition or this instruction, always ask your healthcare professional. [address] any warranty or liability for your use of this information. Please provide this summary of care documentation to your next provider. Your primary care clinician is listed as Abby Ballesteros. If you have any questions after today's visit, please call [phone].

## 2018-03-30 NOTE — LETTER
Herlindamaricarmen Waggoner 1970 
 
3/30/2018 Dear MD Atul Bernardo MD 
 
I had the pleasure of evaluating  Mr. Eugene Dunham in office today. Below are the relevant portions of my assessment and plan of care. ICD-10-CM ICD-9-CM 1. Cardiomyopathy due to hypertension, with heart failure (HCC) I11.0 402.91 2D ECHO COMPLETE ADULT (TTE) I43 425.8 SCHEDULE NUCLEAR STUDY  
 3/18 NYHA3; 4/17 EF was 35-40% in December 2016 EF 20% 2. Hypertension, uncontrolled I10 401.9 hydrALAZINE (APRESOLINE) 100 mg tablet 3/18; 11/16 needs more aggressive HD 
d/w pt in detail; try minoxidil if BP high when no edema 3. End stage renal disease (HCC) N18.6 585.6 4. Pulmonary hypertension I27.20 416.8   
 6/16 [id number] 5. Other ascites R18.8 789.59   
 3/18 paracentesis q 3-4 wks;   
 
 
Current Outpatient Prescriptions Medication Sig Dispense Refill  hydrALAZINE (APRESOLINE) 100 mg tablet Take 1 Tab by mouth three (3) times daily. 270 Tab 1  
 allopurinol (ZYLOPRIM) 100 mg tablet TAKE 1 TABLET BY MOUTH TWICE DAILY 180 Tab 0  cyclobenzaprine (FLEXERIL) 10 mg tablet Take 0.5 Tabs by mouth two (2) times a day. 30 Tab 3  
 lisinopril (PRINIVIL, ZESTRIL) 40 mg tablet TAKE 1 TABLET BY MOUTH DAILY 90 Tab 0  
 NIFEdipine ER (PROCARDIA XL) 90 mg ER tablet Take 90 mg by mouth daily.  thiamine (VITAMIN B-1) 100 mg tablet Take  by mouth daily.  doxazosin (CARDURA) 1 mg tablet TAKE 1 TABLET BY MOUTH EVERY NIGHT 90 Tab 3  
 isosorbide mononitrate ER (IMDUR) 120 mg CR tablet TAKE 1 TABLET BY MOUTH EVERY MORNING 90 Tab 5  sevelamer carbonate (RENVELA) 800 mg tab tab Take  by mouth three (3) times daily.  AMINO ACIDS/PROTEIN HYDROLYS (PRO-STAT 64 PO) Take  by mouth.  mupirocin calcium (BACTROBAN NASAL) 2 % nasal ointment by Both Nostrils route two (2) times a day.     
 b complex-vitamin c-folic acid (NEPHROCAPS) 1 mg capsule Take 1 Cap by mouth daily. [Request refills from PCP (Dr. Amaury Priest)] 30 Cap 1  carvedilol (COREG) 25 mg tablet TAKE 1 TABLET BY MOUTH TWICE DAILY WITH MEALS 180 Tab 3  
 levETIRAcetam (KEPPRA) 250 mg tablet TAKE 1 TABLET BY MOUTH EVERY Monday, Wednesday, Friday AFTER EACH DIALYSIS 15 Tab 0  
 levETIRAcetam (KEPPRA) 500 mg tablet TAKE 1 TABLET BY MOUTH DAILY 30 Tab 0  
 acetaminophen (TYLENOL) 500 mg tablet Take  by mouth every six (6) hours as needed for Pain.  simethicone (PHAZYME) 180 mg cap Take  by mouth.  cloNIDine HCl (CATAPRES) 0.3 mg tablet Take 1 Tab by mouth three (3) times daily. 90 Tab 1  
 oxyCODONE-acetaminophen (PERCOCET) 5-325 mg per tablet Take 1 Tab by mouth every eight (8) hours as needed. Max Daily Amount: 3 Tabs. 20 Tab 0  
 senna-docusate (PERICOLACE) 8.6-50 mg per tablet Take 1 Tab by mouth daily. 30 Tab 3  
 aspirin delayed-release 81 mg tablet Take 1 tablet by mouth daily. 30 tablet 0  
 pantoprazole (PROTONIX) 40 mg tablet Take 1 Tab by mouth Daily (before breakfast). [Request refills from PCP (Dr. Amaury Priest)] 15 Tab 0  cholecalciferol (VITAMIN D3) 1,000 unit tablet Take 2 Tabs by mouth two (2) times a day. [Request refills from PCP (Dr. Amaury Priest)] 60 Tab 0 Orders Placed This Encounter  SCHEDULE NUCLEAR STUDY pharmacologic  2D ECHO COMPLETE ADULT (TTE) Standing Status:   Future Standing Expiration Date:   9/26/2018 Order Specific Question:   Reason for Exam: Answer:   chf  
 hydrALAZINE (APRESOLINE) 100 mg tablet Sig: Take 1 Tab by mouth three (3) times daily. Dispense:  270 Tab Refill:  1 **Patient requests 90 days supply** If you have questions, please do not hesitate to call me. I look forward to following Mr. Maurine Hamman along with you. Sincerely, Mars Ceron MD

## 2018-04-10 ENCOUNTER — CLINICAL SUPPORT (OUTPATIENT)
Dept: CARDIOLOGY CLINIC | Age: 48
End: 2018-04-10

## 2018-04-10 DIAGNOSIS — I10 HYPERTENSION, UNCONTROLLED: Chronic | ICD-10-CM

## 2018-04-10 DIAGNOSIS — I11.0 CARDIOMYOPATHY DUE TO HYPERTENSION, WITH HEART FAILURE (HCC): Primary | Chronic | ICD-10-CM

## 2018-04-10 DIAGNOSIS — I43 CARDIOMYOPATHY DUE TO HYPERTENSION, WITH HEART FAILURE (HCC): Primary | Chronic | ICD-10-CM

## 2018-04-10 DIAGNOSIS — I27.20 PULMONARY HYPERTENSION (HCC): Chronic | ICD-10-CM

## 2018-04-10 NOTE — PROGRESS NOTES
[facility]  [address], [address] [place] [address], [place], [address] [place] [address]  [phone] [place]  [phone] [place]       Name: Brenna Macias         MRN#: [id number]        YOB: 1970   Gender: male Ht:5'11\" Wt:186 lbs       . Date of Rest/Stress Images: 4/10/2018   Referring Physician: Adolfo Burnette MD  Ordering Physician: Kasey Enriquez. Nga Kirk MD, [facility]  Technologist: Ashanti Giles. ANOOP Kirby, C.N.M.T  Diagnosis:  1. Cardiomyopathy due to hypertension, with heart failure ([address]    2. Hypertension, uncontrolled    3. Pulmonary hypertension          Rest/Stress Myoview SPECT Myocardial Perfusion Imaging with  Lexiscan Stress and gated SPECT Imaging      PROCEDURE:      Myocardial perfusion imaging was performed at rest approximately 30 mins following the intravenous injection,(Right hand ) of 12.2 mCi of Tc99m Myoview for evaluation of myocardial function and perfusion at rest.    Baseline Data:  Baseline EKG reveals normal sinus rhythm with mildly prolonged QT. Baseline heart rate was 72 beats per minute. Baseline blood pressure was 140/70 mmHg. Procedure: Following this, the patient was administered 0.4 mg of IV Lexiscan over a 30 second period. Heart rate increased to a maximum of 83 beats per minute. Maximum blood pressure was 140/70 mmHg. No ischemic ST-T changes were noted. No ventricular arrhythmias were noted. The patient did not report chest pain or trouble breathing. The test was stopped due to completion of protocol. Diagnosis:  1. Negative EKG portion for ischemia. 2. Nuclear imaging report to follow. Thank you for this referral.             Pharmacological:  Patient was injected with . 4 mg/mL with Lexiscan intravenously over a period 10 to 20 sec. After pharmacologic stress, the patient was injected intravenously with 33.0 mCi of Tc99m Myoview.  Gating post stress tomographic imaging performed approximately 45 minutes post tracer injection. The data was reconstructed in the short, horizontal long and vertical long axis views and tomographic slices were generated. NUCLEAR IMAGING:    Findings:   1. Stress images reveal normal Myoview distrubution in all the LV segments in short axis, vertical and horizontal long axis views. 2. Resting images have a normal uptake. 3. Gated images reveal normal wall motion and the ejection fraction is calculated to be 56%. Conclusion:   1. Normal perfusion scan. 2. Normal wall motion and ejection fraction. 3. Low risk scan.       Thank you for the referral.    E-signed and Interpreting Physician:    Dalila Malloy MD, [facility]     Date of interpretation: 4/10/2018  Date of final report: 4/10/2018

## 2018-04-19 ENCOUNTER — HOSPITAL ENCOUNTER (OUTPATIENT)
Dept: ULTRASOUND IMAGING | Age: 48
Discharge: HOME | End: 2018-04-19
Attending: INTERNAL MEDICINE
Payer: MEDICARE

## 2018-04-19 ENCOUNTER — HOSPITAL ENCOUNTER (OUTPATIENT)
Dept: LAB | Age: 48
Discharge: HOME | End: 2018-04-19
Attending: INTERNAL MEDICINE
Payer: MEDICARE

## 2018-04-19 DIAGNOSIS — R18.8 OTHER ASCITES: ICD-10-CM

## 2018-04-19 LAB
ANION GAP SERPL CALC-SCNC: 8 MMOL/L (ref 3–18)
APTT PPP: 35.7 SEC (ref 23–36.4)
BUN SERPL-MCNC: 55 MG/DL (ref 7–18)
BUN/CREAT SERPL: 8 (ref 12–20)
CALCIUM SERPL-MCNC: 7.1 MG/DL (ref 8.5–10.1)
CHLORIDE SERPL-SCNC: 105 MMOL/L (ref 100–108)
CO2 SERPL-SCNC: 31 MMOL/L (ref 21–32)
CREAT SERPL-MCNC: 7.23 MG/DL (ref 0.6–1.3)
ERYTHROCYTE [DISTWIDTH] IN BLOOD BY AUTOMATED COUNT: 18.7 % (ref 11.6–14.5)
GLUCOSE SERPL-MCNC: 100 MG/DL (ref 74–99)
HCT VFR BLD AUTO: 27.2 % (ref 36–48)
HGB BLD-MCNC: 8.6 G/DL (ref 13–16)
INR PPP: 1.4 (ref 0.8–1.2)
MCH RBC QN AUTO: 29.2 PG (ref 24–34)
MCHC RBC AUTO-ENTMCNC: 31.6 G/DL (ref 31–37)
MCV RBC AUTO: 92.2 FL (ref 74–97)
PLATELET # BLD AUTO: 235 K/UL (ref 135–420)
PMV BLD AUTO: 10.8 FL (ref 9.2–11.8)
POTASSIUM SERPL-SCNC: 4.1 MMOL/L (ref 3.5–5.5)
PROTHROMBIN TIME: 16.9 SEC (ref 11.5–15.2)
RBC # BLD AUTO: 2.95 M/UL (ref 4.7–5.5)
SODIUM SERPL-SCNC: 144 MMOL/L (ref 136–145)
WBC # BLD AUTO: 8.3 K/UL (ref 4.6–13.2)

## 2018-04-19 PROCEDURE — 85610 PROTHROMBIN TIME: CPT | Performed by: INTERNAL MEDICINE

## 2018-04-19 PROCEDURE — 49083 ABD PARACENTESIS W/IMAGING: CPT

## 2018-04-19 PROCEDURE — 77030014137 HC TY PARCNT TELE -B

## 2018-04-19 PROCEDURE — 85027 COMPLETE CBC AUTOMATED: CPT | Performed by: INTERNAL MEDICINE

## 2018-04-19 PROCEDURE — 36415 COLL VENOUS BLD VENIPUNCTURE: CPT | Performed by: INTERNAL MEDICINE

## 2018-04-19 PROCEDURE — 80048 BASIC METABOLIC PNL TOTAL CA: CPT | Performed by: INTERNAL MEDICINE

## 2018-04-19 PROCEDURE — 85730 THROMBOPLASTIN TIME PARTIAL: CPT | Performed by: INTERNAL MEDICINE

## 2018-04-19 NOTE — H&P
OUTPATIENT HISTORY AND PHYSICAL      Today 4/19/2018     Indication/Symptoms:   Tania Morrison is a 49 y.o. male with recurrent ascites who presents for an US-guided paracentesis. Current Meds:    Prior to Admission medications    Medication Sig Start Date End Date Taking? Authorizing Provider   hydrALAZINE (APRESOLINE) 100 mg tablet Take 1 Tab by mouth three (3) times daily. 3/30/18   Shakir Aguilar MD   allopurinol (ZYLOPRIM) 100 mg tablet TAKE 1 TABLET BY MOUTH TWICE DAILY 3/15/18   Mel Jimenez MD   cyclobenzaprine (FLEXERIL) 10 mg tablet Take 0.5 Tabs by mouth two (2) times a day. 5/4/17   Ximena Boyd MD   lisinopril (PRINIVIL, ZESTRIL) 40 mg tablet TAKE 1 TABLET BY MOUTH DAILY 4/19/17   Ximena Boyd MD   NIFEdipine ER (PROCARDIA XL) 90 mg ER tablet Take 90 mg by mouth daily. Historical Provider   thiamine (VITAMIN B-1) 100 mg tablet Take  by mouth daily. Historical Provider   doxazosin (CARDURA) 1 mg tablet TAKE 1 TABLET BY MOUTH EVERY NIGHT 4/10/17   Shakir Aguilar MD   isosorbide mononitrate ER (IMDUR) 120 mg CR tablet TAKE 1 TABLET BY MOUTH EVERY MORNING 4/10/17   Shakir Aguilar MD   sevelamer carbonate (RENVELA) 800 mg tab tab Take  by mouth three (3) times daily. Historical Provider   AMINO ACIDS/PROTEIN HYDROLYS (PRO-STAT 64 PO) Take  by mouth. Historical Provider   mupirocin calcium (BACTROBAN NASAL) 2 % nasal ointment by Both Nostrils route two (2) times a day. Historical Provider   b complex-vitamin c-folic acid (NEPHROCAPS) 1 mg capsule Take 1 Cap by mouth daily.  [Request refills from PCP (Dr. Nirav Priest)] 3/31/17   Ximena Boyd MD   carvedilol (COREG) 25 mg tablet TAKE 1 TABLET BY MOUTH TWICE DAILY WITH MEALS 3/31/17   Ximena Boyd MD   levETIRAcetam (KEPPRA) 250 mg tablet TAKE 1 TABLET BY MOUTH EVERY Monday, Wednesday, Friday AFTER EACH DIALYSIS 12/8/16   Neelam Fairchild MD   levETIRAcetam (KEPPRA) 500 mg tablet TAKE 1 TABLET BY MOUTH DAILY 12/8/16   Burton Engel MD   acetaminophen (TYLENOL) 500 mg tablet Take  by mouth every six (6) hours as needed for Pain. Historical Provider   simethicone (PHAZYME) 180 mg cap Take  by mouth. Historical Provider   cloNIDine HCl (CATAPRES) 0.3 mg tablet Take 1 Tab by mouth three (3) times daily. 9/22/16   Ventura Carr MD   oxyCODONE-acetaminophen (PERCOCET) 5-325 mg per tablet Take 1 Tab by mouth every eight (8) hours as needed. Max Daily Amount: 3 Tabs. 9/22/16   Ventura Carr MD   senna-docusate (PERICOLACE) 8.6-50 mg per tablet Take 1 Tab by mouth daily. 3/1/16   Rl Avendaño MD   aspirin delayed-release 81 mg tablet Take 1 tablet by mouth daily. 1/22/15   Kwesi Terrazas MD   pantoprazole (PROTONIX) 40 mg tablet Take 1 Tab by mouth Daily (before breakfast). [Request refills from PCP (Dr. Tayo Priest)] 5/8/13   Praful Newby MD   cholecalciferol (VITAMIN D3) 1,000 unit tablet Take 2 Tabs by mouth two (2) times a day. [Request refills from PCP (Dr. Tayo Priest)] 5/8/13   Praful Newby MD       Allergies: Allergies   Allergen Reactions    Amlodipine Swelling    Nuts [Tree Nut] Swelling    Pcn [Penicillins] Unknown (comments)    Phenothiazines Other (comments)     Per father, \"He has a parkinsonian reaction to this medication. \"       Comorbid Conditions:    Past Medical History:   Diagnosis Date    Anemia in chronic kidney disease(285.21) 4/24/2013    Cardiomyopathy due to hypertension ([address] 4/24/2013    EF 20%    Edema of lower extremity 4/24/2013    End stage renal disease ([address] 04/19/2013    HD since 9/2003    Hyperphosphatemia 4/24/2013    Hypertension     Hypertension, uncontrolled 4/24/2013    Personal history of atrial flutter 4/24/2013    Pulmonary hypertension ([address] 4/24/2013    Recommendation refused by patient 4/24/2013    Patient refuses dialysis (hemodialysis or peritoneal dialysis)    Secondary hyperparathyroidism of renal origin ([address] 4/24/2013    Stroke [facility])     x2 1/2015    Vitamin D insufficiency 4/25/2013    Vitamin D 25-Hydroxy 20.7           Past Surgical History:   Procedure Laterality Date    HX CSF SHUNT  09/2016    HX HEART CATHETERIZATION  01/2015     Data:    There were no vitals taken for this visit.:  Recent Labs      04/19/18   0745   PLT  235     Recent Labs      04/19/18   0745   INR  1.4*   APTT  35.7       The H & P and/or progress notes and any available imaging were reviewed. The risks, indications and possible alternatives to the procedure, including doing nothing, were discussed and informed consent was obtained. Physical Exam:      Mental status:   Alert and oriented. Heart:   Regular rate. Lungs:  Normal respiratory effort. The patient is an appropriate candidate to undergo the planned procedure and sedation.     Jessica Hogue

## 2018-04-19 NOTE — PROCEDURES
RADIOLOGY POST PARACENTESIS NOTE     April 19, 2018       1:46 PM     Preoperative Diagnosis:   Ascites    Postoperative Diagnosis:  Same. :  Tessie Christina PA-C    Assistant:  None. Type of Anesthesia: 1% plain lidocaine    Procedure/Description:  US-Guided paracentesis    Findings:  Using ultrasound guidance the largest pocket of peritoneal fluid was localized and marked at the left lower quadrant. The patient was prepped and draped in the usual fashion. 1% Lidocaine was infiltrated locally. A 5 [place] over the needle catheter was advanced into the peritoneal cavity and clear rand colored fluid was aspirated. Once fluid was easily aspirated, the needle was removed leaving the catheter in place. The catheter was connected to vacuum containers and 3100 liters of ascitic fluid was removed.     Estimated blood Loss:  Minimal    Specimen Removed:   Yes    Complications: None    Condition: Stable    Discharge Plan:  discharge home     Jessica Marrufo

## 2018-05-10 ENCOUNTER — HOSPITAL ENCOUNTER (OUTPATIENT)
Dept: ULTRASOUND IMAGING | Age: 48
Discharge: HOME | End: 2018-05-10
Attending: INTERNAL MEDICINE
Payer: MEDICARE

## 2018-05-10 DIAGNOSIS — R18.8 OTHER ASCITES: ICD-10-CM

## 2018-05-10 PROCEDURE — C1729 CATH, DRAINAGE: HCPCS

## 2018-05-24 ENCOUNTER — OFFICE VISIT (OUTPATIENT)
Dept: CARDIOLOGY CLINIC | Age: 48
End: 2018-05-24

## 2018-05-24 VITALS
HEART RATE: 79 BPM | SYSTOLIC BLOOD PRESSURE: 173 MMHG | BODY MASS INDEX: 25.9 KG/M2 | DIASTOLIC BLOOD PRESSURE: 101 MMHG | HEIGHT: 71 IN | WEIGHT: 185 LBS

## 2018-05-24 DIAGNOSIS — I27.20 PULMONARY HYPERTENSION (HCC): Chronic | ICD-10-CM

## 2018-05-24 DIAGNOSIS — Z91.14 NON COMPLIANCE W MEDICATION REGIMEN: ICD-10-CM

## 2018-05-24 DIAGNOSIS — I50.32 CHRONIC DIASTOLIC CONGESTIVE HEART FAILURE (HCC): Primary | ICD-10-CM

## 2018-05-24 DIAGNOSIS — I10 HYPERTENSION, UNCONTROLLED: Chronic | ICD-10-CM

## 2018-05-24 RX ORDER — CINACALCET 60 MG/1
TABLET, FILM COATED ORAL
COMMUNITY
End: 2020-01-01 | Stop reason: ALTCHOICE

## 2018-05-24 NOTE — PROGRESS NOTES
Patient didn't bring medications, verbally reviewed    1. Have you been to the ER, urgent care clinic since your last visit? Hospitalized since your last visit? Yes When: 4/18 Where: [facility] Reason for visit: Stomach Virus    2. Have you seen or consulted any other health care providers outside of the [facility] since your last visit? Include any pap smears or colon screening. No     3. Since your last visit, have you had any of the following symptoms? shortness of breath and swelling in legs/arms. 4.  Have you had any blood work, X-rays or cardiac testing? Yes Where: [facility] Reason for visit: Gastroenterologist Ordered     Requested: NO     In [facility]: YES    5. Where do you normally have your labs drawn? [facility]     6. Do you need any refills today?    No

## 2018-05-24 NOTE — PROGRESS NOTES
HISTORY OF PRESENT ILLNESS  Hill Jeffries is a 49 y.o. male. Cardiomyopathy   The history is provided by the medical records (3/18 cath not done yet). Associated symptoms include shortness of breath. Pertinent negatives include no chest pain and no headaches. Hypertension   The history is provided by the medical records. This is a chronic problem. Associated symptoms include shortness of breath. Pertinent negatives include no chest pain and no headaches. Shortness of Breath   The history is provided by the patient. This is a chronic problem. The problem occurs intermittently. The current episode started more than 1 week ago. The problem has been gradually improving. Associated symptoms include leg swelling. Pertinent negatives include no fever, no headaches, no cough, no wheezing, no PND, no orthopnea, no chest pain, no vomiting, no rash and no claudication. The problem's precipitants include exercise (walking in the house). Leg Swelling   The history is provided by the patient. This is a chronic problem. The current episode started more than 1 week ago. The problem occurs every several days. The problem has not changed since onset. Associated symptoms include shortness of breath. Pertinent negatives include no chest pain and no headaches. The symptoms are aggravated by standing. The symptoms are relieved by sleep (HD). Review of Systems   Constitutional: Negative for chills, fever, malaise/fatigue and weight loss. HENT: Negative for nosebleeds. Eyes: Negative for discharge. Respiratory: Positive for shortness of breath. Negative for cough and wheezing. Cardiovascular: Positive for leg swelling. Negative for chest pain, palpitations, orthopnea, claudication and PND. Gastrointestinal: Negative for diarrhea, nausea and vomiting. Genitourinary: Negative for dysuria and hematuria. Musculoskeletal: Negative for joint pain. Skin: Negative for rash.    Neurological: Negative for dizziness, seizures, loss of consciousness and headaches. Endo/Heme/Allergies: Negative for polydipsia. Does not bruise/bleed easily. Psychiatric/Behavioral: Negative for depression and substance abuse. The patient does not have insomnia. Allergies   Allergen Reactions    Amlodipine Swelling    Nuts [Tree Nut] Swelling    Pcn [Penicillins] Unknown (comments)    Phenothiazines Other (comments)     Per father, \"He has a parkinsonian reaction to this medication. \"       Past Medical History:   Diagnosis Date    Anemia in chronic kidney disease(285.21) 4/24/2013    Cardiomyopathy due to hypertension ([facility] 4/24/2013    EF 20%    Edema of lower extremity 4/24/2013    [facility] stage renal disease ([facility] 04/19/2013    HD since 9/2003    Hyperphosphatemia 4/24/2013    Hypertension     Hypertension, uncontrolled 4/24/2013    Personal history of atrial flutter 4/24/2013    Pulmonary hypertension ([facility] 4/24/2013    Recommendation refused by patient 4/24/2013    Patient refuses dialysis (hemodialysis or peritoneal dialysis)    Secondary hyperparathyroidism of renal origin ([facility] 4/24/2013    Stroke ([facility]     x2 1/2015    Vitamin D insufficiency 4/25/2013    Vitamin D 25-Hydroxy 20.7        Family History   Problem Relation Age of Onset    Hypertension Mother     Hypertension Father     Heart Attack Neg Hx     Stroke Neg Hx        Social History   Substance Use Topics    Smoking status: Never Smoker    Smokeless tobacco: Never Used    Alcohol use No        Current Outpatient Prescriptions   Medication Sig    cinacalcet (SENSIPAR) 60 mg tab Take  by mouth.  hydrALAZINE (APRESOLINE) 100 mg tablet Take 1 Tab by mouth three (3) times daily.  allopurinol (ZYLOPRIM) 100 mg tablet TAKE 1 TABLET BY MOUTH TWICE DAILY    cyclobenzaprine (FLEXERIL) 10 mg tablet Take 0.5 Tabs by mouth two (2) times a day.     lisinopril (PRINIVIL, ZESTRIL) 40 mg tablet TAKE 1 TABLET BY MOUTH DAILY    NIFEdipine ER (PROCARDIA XL) 90 mg ER tablet Take 90 mg by mouth daily.  thiamine (VITAMIN B-1) 100 mg tablet Take  by mouth daily.  doxazosin (CARDURA) 1 mg tablet TAKE 1 TABLET BY MOUTH EVERY NIGHT    isosorbide mononitrate ER (IMDUR) 120 mg CR tablet TAKE 1 TABLET BY MOUTH EVERY MORNING    sevelamer carbonate (RENVELA) 800 mg tab tab Take  by mouth three (3) times daily.  AMINO ACIDS/PROTEIN HYDROLYS (PRO-STAT 64 PO) Take  by mouth.  mupirocin calcium (BACTROBAN NASAL) 2 % nasal ointment by Both Nostrils route two (2) times a day. Indications: as  needed    b complex-vitamin c-folic acid (NEPHROCAPS) 1 mg capsule Take 1 Cap by mouth daily. [Request refills from PCP (Dr. Sarah Priest)]    carvedilol (COREG) 25 mg tablet TAKE 1 TABLET BY MOUTH TWICE DAILY WITH MEALS    levETIRAcetam (KEPPRA) 250 mg tablet TAKE 1 TABLET BY MOUTH EVERY Monday, Wednesday, Friday AFTER EACH DIALYSIS    levETIRAcetam (KEPPRA) 500 mg tablet TAKE 1 TABLET BY MOUTH DAILY    acetaminophen (TYLENOL) 500 mg tablet Take  by mouth every six (6) hours as needed for Pain.  simethicone (PHAZYME) 180 mg cap Take  by mouth.  cloNIDine HCl (CATAPRES) 0.3 mg tablet Take 1 Tab by mouth three (3) times daily.  oxyCODONE-acetaminophen (PERCOCET) 5-325 mg per tablet Take 1 Tab by mouth every eight (8) hours as needed. Max Daily Amount: 3 Tabs.  senna-docusate (PERICOLACE) 8.6-50 mg per tablet Take 1 Tab by mouth daily.  aspirin delayed-release 81 mg tablet Take 1 tablet by mouth daily.  pantoprazole (PROTONIX) 40 mg tablet Take 1 Tab by mouth Daily (before breakfast). [Request refills from PCP (Dr. Sarah Priest)]    cholecalciferol (VITAMIN D3) 1,000 unit tablet Take 2 Tabs by mouth two (2) times a day. [Request refills from PCP (Dr. Sarah Priest)]     No current facility-administered medications for this visit.          Past Surgical History:   Procedure Laterality Date    HX CSF SHUNT  09/2016    HX HEART CATHETERIZATION  01/2015 Visit Vitals    BP (!) 173/101    Pulse 79    Ht 5' 11\" (1.803 m)    Wt 185 lb (83.9 kg)    BMI 25.8 kg/m2       Diagnostic Studies:  I have reviewed the relevant tests done on the patient and show as follows  EKG tracings reviewed by me today. No flowsheet data found. Mr. Salvador Quesada has a reminder for a \"due or due soon\" health maintenance. I have asked that he contact his primary care provider for follow-up on this health maintenance. 4/18 Nuc Stress  Conclusion:   1. Normal perfusion scan. 2. Normal wall motion and ejection fraction. 3. Low risk scan.  12/16 ECHO  SUMMARY:  Left ventricle: Systolic function was moderately to markedly reduced by EF  (biplane method of disks). Ejection fraction was estimated in the range of  35 % to 40 %. There was moderate diffuse hypokinesis. Wall thickness was  markedly increased. Features were consistent with a pseudonormal left  ventricular filling pattern, with concomitant abnormal relaxation and  increased filling pressure (grade 2 diastolic dysfunction). Right ventricle: Systolic pressure was markedly increased. Estimated peak  pressure was at least 70 mmHg. Left atrium: The atrium was severely dilated. Right atrium: The atrium was dilated. Tricuspid valve: There was mild to moderate regurgitation. COMPARISONS:  There has been no significant change. Comparison was made with the  previous study of 27-Jun-2015. Physical Exam   Constitutional: He is oriented to person, place, and time. He appears well-developed and well-nourished. No distress. HENT:   Head: Normocephalic and atraumatic. Mouth/Throat: Normal dentition. Eyes: Right eye exhibits no discharge. Left eye exhibits no discharge. No scleral icterus. Neck: Neck supple. JVD present. Carotid bruit is not present. No thyromegaly present. Cardiovascular: Normal rate, regular rhythm, S1 normal, S2 normal, normal heart sounds and intact distal pulses.   Exam reveals no gallop and no friction rub. No murmur heard. Pulmonary/Chest: Effort normal and breath sounds normal. He has no wheezes. He has no rales. Abdominal: Soft. He exhibits no mass. There is no tenderness. +ascites   Musculoskeletal: He exhibits edema (2+). Lymphadenopathy:        Right cervical: No superficial cervical adenopathy present. Left cervical: No superficial cervical adenopathy present. Neurological: He is alert and oriented to person, place, and time. Skin: Skin is warm and dry. No rash noted. Psychiatric: He has a normal mood and affect. His behavior is normal.       ASSESSMENT and PLAN    Patient education included a review of the importance of compliance in the treatment regimen        Diagnoses and all orders for this visit:    1. Chronic diastolic congestive heart failure ([address]  Comments:  5/18 ESRD- misses HD about 1/wk    2. Pulmonary hypertension  Comments:  12/16 PAP70, significant fluid overload, noncompl with HD;  6/16 [id number]      3. Hypertension, uncontrolled  Comments:  5/18; 3/18; 11/16 needs more aggressive HD   d/w pt in detail; try minoxidil if BP high when no edema      4. Non compliance w medication regimen        Pertinent laboratory and test data reviewed and discussed with patient. See patient instructions also for other medical advice given    There are no discontinued medications. Follow-up Disposition:  Return in about 3 months (around 8/24/2018), or if symptoms worsen or fail to improve.

## 2018-05-24 NOTE — PATIENT INSTRUCTIONS
There are no discontinued medications. Avoiding Triggers With Heart Failure: Care Instructions  Your Care Instructions    Triggers are anything that make your heart failure flare up. A flare-up is also called \"sudden heart failure\" or \"acute heart failure. \" When you have a flare-up, fluid builds up in your lungs, and you have problems breathing. You might need to go to the hospital. By watching for changes in your condition and avoiding triggers, you can prevent heart failure flare-ups. Follow-up care is a key part of your treatment and safety. Be sure to make and go to all appointments, and call your doctor if you are having problems. It's also a good idea to know your test results and keep a list of the medicines you take. How can you care for yourself at home? Watch for changes in your weight and condition  · Weigh yourself without clothing at the same time each day. Record your weight. Call your doctor if you have sudden weight gain, such as more than 2 to 3 pounds in a day or 5 pounds in a week. (Your doctor may suggest a different range of weight gain.) A sudden weight gain may mean that your heart failure is getting worse. · Keep a daily record of your symptoms. Write down any changes in how you feel, such as new shortness of breath, cough, or problems eating. Also record if your ankles are more swollen than usual and if you feel more tired than usual. Note anything that you ate or did that could have triggered these changes. Limit sodium  Sodium causes your body to hold on to extra water. This may cause your heart failure symptoms to get worse. People get most of their sodium from processed foods. Fast food and restaurant meals also tend to be very high in sodium. · Your doctor may suggest that you limit sodium to 2,000 milligrams (mg) a day or less. That is less than 1 teaspoon of salt a day, including all the salt you eat in cooking or in packaged foods.   · Read food labels on cans and food packages. They tell you how much sodium you get in one serving. Check the serving size. If you eat more than one serving, you are getting more sodium. · Be aware that sodium can come in forms other than salt, including monosodium glutamate (MSG), sodium citrate, and sodium bicarbonate (baking soda). MSG is often added to Asian food. You can sometimes ask for food without MSG or salt. · Slowly reducing salt will help you adjust to the taste. Take the salt shaker off the table. · Flavor your food with garlic, lemon juice, onion, vinegar, herbs, and spices instead of salt. Do not use soy sauce, steak sauce, onion salt, garlic salt, mustard, or ketchup on your food, unless it is labeled \"low-sodium\" or \"low-salt. \"  · Make your own salad dressings, sauces, and ketchup without adding salt. · Use fresh or frozen ingredients, instead of canned ones, whenever you can. Choose low-sodium canned goods. · Eat less processed food and food from restaurants, including fast food. Exercise as directed  Moderate, regular exercise is very good for your heart. It improves your blood flow and helps control your weight. But too much exercise can stress your heart and cause a heart failure flare-up. · Check with your doctor before you start an exercise program.  · Walking is an easy way to get exercise. Start out slowly. Gradually increase the length and pace of your walk. Swimming, riding a bike, and using a treadmill are also good forms of exercise. · When you exercise, watch for signs that your heart is working too hard. You are pushing yourself too hard if you cannot talk while you are exercising. If you become short of breath or dizzy or have chest pain, stop, sit down, and rest.  · Do not exercise when you do not feel well. Take medicines correctly  · Take your medicines exactly as prescribed. Call your doctor if you think you are having a problem with your medicine. · Make a list of all the medicines you take.  Include those prescribed to you by other doctors and any over-the-counter medicines, vitamins, or supplements you take. Take this list with you when you go to any doctor. · Take your medicines at the same time every day. It may help you to post a list of all the medicines you take every day and what time of day you take them. · Make taking your medicine as simple as you can. Plan times to take your medicines when you are doing other things, such as eating a meal or getting ready for bed. This will make it easier to remember to take your medicines. · Get organized. Use helpful tools, such as daily or weekly pill containers. When should you call for help? Call 911 if you have symptoms of sudden heart failure such as:  ? · You have severe trouble breathing. ? · You cough up pink, foamy mucus. ? · You have a new irregular or rapid heartbeat. ?Call your doctor now or seek immediate medical care if:  ? · You have new or increased shortness of breath. ? · You are dizzy or lightheaded, or you feel like you may faint. ? · You have sudden weight gain, such as more than 2 to 3 pounds in a day or 5 pounds in a week. (Your doctor may suggest a different range of weight gain.)   ? · You have increased swelling in your legs, ankles, or feet. ? · You are suddenly so tired or weak that you cannot do your usual activities. ? Watch closely for changes in your health, and be sure to contact your doctor if you develop new symptoms. Where can you learn more? Go to http://randa-pavel.info/. Enter Z734 in the search box to learn more about \"Avoiding Triggers With Heart Failure: Care Instructions. \"  Current as of: September 21, 2016  Content Version: 11.4  © 4543-6533 [employer]. Care instructions adapted under license by [employer] (which disclaims liability or warranty for this information).  If you have questions about a medical condition or this instruction, always ask your healthcare professional. [address] any warranty or liability for your use of this information.

## 2018-05-24 NOTE — LETTER
Stephan Led 1970 
 
5/24/2018 Dear Jonas Cunningham MD 
 
I had the pleasure of evaluating  Mr. Suman Mac in office today. Below are the relevant portions of my assessment and plan of care. ICD-10-CM ICD-9-CM 1. Chronic diastolic congestive heart failure (HCC) I50.32 428.32   
  428.0   
 5/18 ESRD- misses HD about 1/wk 2. Pulmonary hypertension I27.20 416.8   
 12/16 PAP70, significant fluid overload, noncompl with HD;  6/16 [id number] 3. Hypertension, uncontrolled I10 401.9   
 5/18; 3/18; 11/16 needs more aggressive HD  
d/w pt in detail; try minoxidil if BP high when no edema 4. Non compliance w medication regimen Z91.14 V15.81 Current Outpatient Prescriptions Medication Sig Dispense Refill  cinacalcet (SENSIPAR) 60 mg tab Take  by mouth.  hydrALAZINE (APRESOLINE) 100 mg tablet Take 1 Tab by mouth three (3) times daily. 270 Tab 1  
 allopurinol (ZYLOPRIM) 100 mg tablet TAKE 1 TABLET BY MOUTH TWICE DAILY 180 Tab 0  cyclobenzaprine (FLEXERIL) 10 mg tablet Take 0.5 Tabs by mouth two (2) times a day. 30 Tab 3  
 lisinopril (PRINIVIL, ZESTRIL) 40 mg tablet TAKE 1 TABLET BY MOUTH DAILY 90 Tab 0  
 NIFEdipine ER (PROCARDIA XL) 90 mg ER tablet Take 90 mg by mouth daily.  thiamine (VITAMIN B-1) 100 mg tablet Take  by mouth daily.  doxazosin (CARDURA) 1 mg tablet TAKE 1 TABLET BY MOUTH EVERY NIGHT 90 Tab 3  
 isosorbide mononitrate ER (IMDUR) 120 mg CR tablet TAKE 1 TABLET BY MOUTH EVERY MORNING 90 Tab 5  sevelamer carbonate (RENVELA) 800 mg tab tab Take  by mouth three (3) times daily.  AMINO ACIDS/PROTEIN HYDROLYS (PRO-STAT 64 PO) Take  by mouth.  mupirocin calcium (BACTROBAN NASAL) 2 % nasal ointment by Both Nostrils route two (2) times a day. Indications: as  needed  b complex-vitamin c-folic acid (NEPHROCAPS) 1 mg capsule Take 1 Cap by mouth daily.  [Request refills from PCP (Dr. Sarah Priest)] 30 Cap 1  
  carvedilol (COREG) 25 mg tablet TAKE 1 TABLET BY MOUTH TWICE DAILY WITH MEALS 180 Tab 3  
 levETIRAcetam (KEPPRA) 250 mg tablet TAKE 1 TABLET BY MOUTH EVERY Monday, Wednesday, Friday AFTER EACH DIALYSIS 15 Tab 0  
 levETIRAcetam (KEPPRA) 500 mg tablet TAKE 1 TABLET BY MOUTH DAILY 30 Tab 0  
 acetaminophen (TYLENOL) 500 mg tablet Take  by mouth every six (6) hours as needed for Pain.  simethicone (PHAZYME) 180 mg cap Take  by mouth.  cloNIDine HCl (CATAPRES) 0.3 mg tablet Take 1 Tab by mouth three (3) times daily. 90 Tab 1  
 oxyCODONE-acetaminophen (PERCOCET) 5-325 mg per tablet Take 1 Tab by mouth every eight (8) hours as needed. Max Daily Amount: 3 Tabs. 20 Tab 0  
 senna-docusate (PERICOLACE) 8.6-50 mg per tablet Take 1 Tab by mouth daily. 30 Tab 3  
 aspirin delayed-release 81 mg tablet Take 1 tablet by mouth daily. 30 tablet 0  
 pantoprazole (PROTONIX) 40 mg tablet Take 1 Tab by mouth Daily (before breakfast). [Request refills from PCP (Dr. Christianna Carrel Barnwell)] 15 Tab 0  cholecalciferol (VITAMIN D3) 1,000 unit tablet Take 2 Tabs by mouth two (2) times a day. [Request refills from PCP (Dr. Christianna Carrel Barnwell)] 60 Tab 0 Orders Placed This Encounter  cinacalcet (SENSIPAR) 60 mg tab Sig: Take  by mouth. If you have questions, please do not hesitate to call me. I look forward to following  Alber Jordan along with you. Sincerely, Sami Sun MD

## 2018-05-31 ENCOUNTER — HOSPITAL ENCOUNTER (OUTPATIENT)
Dept: ULTRASOUND IMAGING | Age: 48
Discharge: HOME | End: 2018-05-31
Attending: INTERNAL MEDICINE
Payer: MEDICARE

## 2018-05-31 ENCOUNTER — HOSPITAL ENCOUNTER (OUTPATIENT)
Dept: LAB | Age: 48
Discharge: HOME | End: 2018-05-31
Attending: INTERNAL MEDICINE
Payer: MEDICARE

## 2018-05-31 DIAGNOSIS — R18.8 OTHER ASCITES: ICD-10-CM

## 2018-05-31 LAB
ANION GAP SERPL CALC-SCNC: 16 MMOL/L (ref 3–18)
APTT PPP: 36.4 SEC (ref 23–36.4)
BUN SERPL-MCNC: 107 MG/DL (ref 7–18)
BUN/CREAT SERPL: 9 (ref 12–20)
CALCIUM SERPL-MCNC: 8 MG/DL (ref 8.5–10.1)
CHLORIDE SERPL-SCNC: 106 MMOL/L (ref 100–108)
CO2 SERPL-SCNC: 22 MMOL/L (ref 21–32)
CREAT SERPL-MCNC: 11.8 MG/DL (ref 0.6–1.3)
ERYTHROCYTE [DISTWIDTH] IN BLOOD BY AUTOMATED COUNT: 18.1 % (ref 11.6–14.5)
GLUCOSE SERPL-MCNC: 70 MG/DL (ref 74–99)
HCT VFR BLD AUTO: 33.6 % (ref 36–48)
HGB BLD-MCNC: 10.6 G/DL (ref 13–16)
INR PPP: 1.5 (ref 0.8–1.2)
MCH RBC QN AUTO: 28.8 PG (ref 24–34)
MCHC RBC AUTO-ENTMCNC: 31.5 G/DL (ref 31–37)
MCV RBC AUTO: 91.3 FL (ref 74–97)
PLATELET # BLD AUTO: 226 K/UL (ref 135–420)
PMV BLD AUTO: 11.4 FL (ref 9.2–11.8)
POTASSIUM SERPL-SCNC: 4.8 MMOL/L (ref 3.5–5.5)
PROTHROMBIN TIME: 17.1 SEC (ref 11.5–15.2)
RBC # BLD AUTO: 3.68 M/UL (ref 4.7–5.5)
SODIUM SERPL-SCNC: 144 MMOL/L (ref 136–145)
WBC # BLD AUTO: 7.1 K/UL (ref 4.6–13.2)

## 2018-05-31 PROCEDURE — 36415 COLL VENOUS BLD VENIPUNCTURE: CPT | Performed by: PHYSICIAN ASSISTANT

## 2018-05-31 PROCEDURE — 77030020897 US GUIDE PARACENTESIS

## 2018-05-31 PROCEDURE — 85027 COMPLETE CBC AUTOMATED: CPT | Performed by: PHYSICIAN ASSISTANT

## 2018-05-31 PROCEDURE — 80048 BASIC METABOLIC PNL TOTAL CA: CPT | Performed by: PHYSICIAN ASSISTANT

## 2018-05-31 PROCEDURE — 85730 THROMBOPLASTIN TIME PARTIAL: CPT | Performed by: PHYSICIAN ASSISTANT

## 2018-05-31 PROCEDURE — 85610 PROTHROMBIN TIME: CPT | Performed by: PHYSICIAN ASSISTANT

## 2018-05-31 NOTE — PROCEDURES
INTERVENTIONAL RADIOLOGY POST PROCEDURE NOTE              Paracentesis procedure note    May 31, 2018       3:43 PM     Preoperative Diagnosis:   Ascites. Postoperative Diagnosis:  Same. Attending Physician: Dr Kirt Azar    : Darnell Winston PA-C    Assistant:  None. Type of Anesthesia:  1% Lidocaine, local    Procedure:  Paracentesis    Description:   Using ultrasound guidance the largest pocket of peritoneal fluid was localized and marked at the Left lower quadrant. The patient was prepped and draped in the usual fashion. 1% Lidocaine was infiltrated locally. A 5 Danish over the needle catheter was advanced into the peritoneal cavity and clear colored fluid was aspirated. Once fluid was easily aspirated the needle was removed leaving the catheter in place. The catheter was connected to vacuum containers and 2.7 liters of ascitic fluid was removed. Findings:   2.7  Liters of ascites removed. Estimated blood Loss:  Minimal     Transfusions: None. Implants: None. Specimen Removed:   No    Drains: None.     Complications: None    Condition: Stable    Disposition: Home    Faith Taylor PA-C

## 2018-06-28 ENCOUNTER — HOSPITAL ENCOUNTER (OUTPATIENT)
Dept: ULTRASOUND IMAGING | Age: 48
Discharge: HOME | End: 2018-06-28
Attending: INTERNAL MEDICINE
Payer: MEDICARE

## 2018-06-28 ENCOUNTER — HOSPITAL ENCOUNTER (OUTPATIENT)
Dept: LAB | Age: 48
Discharge: HOME | End: 2018-06-28
Attending: INTERNAL MEDICINE
Payer: MEDICARE

## 2018-06-28 DIAGNOSIS — R18.8 ASCITES: ICD-10-CM

## 2018-06-28 DIAGNOSIS — R18.8 OTHER ASCITES: ICD-10-CM

## 2018-06-28 LAB
ANION GAP SERPL CALC-SCNC: 9 MMOL/L (ref 3–18)
APTT PPP: 30.9 SEC (ref 23–36.4)
BUN SERPL-MCNC: 42 MG/DL (ref 7–18)
BUN/CREAT SERPL: 7 (ref 12–20)
CALCIUM SERPL-MCNC: 6.9 MG/DL (ref 8.5–10.1)
CHLORIDE SERPL-SCNC: 102 MMOL/L (ref 100–108)
CO2 SERPL-SCNC: 32 MMOL/L (ref 21–32)
CREAT SERPL-MCNC: 6.25 MG/DL (ref 0.6–1.3)
ERYTHROCYTE [DISTWIDTH] IN BLOOD BY AUTOMATED COUNT: 17.2 % (ref 11.6–14.5)
GLUCOSE SERPL-MCNC: 90 MG/DL (ref 74–99)
HCT VFR BLD AUTO: 34.2 % (ref 36–48)
HGB BLD-MCNC: 10.5 G/DL (ref 13–16)
INR PPP: 1.3 (ref 0.8–1.2)
MCH RBC QN AUTO: 27.6 PG (ref 24–34)
MCHC RBC AUTO-ENTMCNC: 30.7 G/DL (ref 31–37)
MCV RBC AUTO: 90 FL (ref 74–97)
PLATELET # BLD AUTO: 204 K/UL (ref 135–420)
PMV BLD AUTO: 12.8 FL (ref 9.2–11.8)
POTASSIUM SERPL-SCNC: 4.3 MMOL/L (ref 3.5–5.5)
PROTHROMBIN TIME: 15.8 SEC (ref 11.5–15.2)
RBC # BLD AUTO: 3.8 M/UL (ref 4.7–5.5)
SODIUM SERPL-SCNC: 143 MMOL/L (ref 136–145)
WBC # BLD AUTO: 5.6 K/UL (ref 4.6–13.2)

## 2018-06-28 PROCEDURE — 85027 COMPLETE CBC AUTOMATED: CPT | Performed by: PHYSICIAN ASSISTANT

## 2018-06-28 PROCEDURE — 36415 COLL VENOUS BLD VENIPUNCTURE: CPT | Performed by: PHYSICIAN ASSISTANT

## 2018-06-28 PROCEDURE — 80048 BASIC METABOLIC PNL TOTAL CA: CPT | Performed by: PHYSICIAN ASSISTANT

## 2018-06-28 PROCEDURE — 85610 PROTHROMBIN TIME: CPT | Performed by: PHYSICIAN ASSISTANT

## 2018-06-28 PROCEDURE — 85730 THROMBOPLASTIN TIME PARTIAL: CPT | Performed by: PHYSICIAN ASSISTANT

## 2018-06-28 PROCEDURE — C1729 CATH, DRAINAGE: HCPCS

## 2018-06-28 NOTE — PROCEDURES
RADIOLOGY POST PARACENTESIS NOTE     June 28, 2018       2:28 PM     Preoperative Diagnosis:   Ascites    Postoperative Diagnosis:  Same. :  Daniel Brown PA-C    Assistant:  None. Type of Anesthesia: 1% plain lidocaine    Procedure/Description:  US-Guided paracentesis    Findings:  Using ultrasound guidance the largest pocket of peritoneal fluid was localized and marked at the left lower quadrant. The patient was prepped and draped in the usual fashion. 1% Lidocaine was infiltrated locally. A 5 Vietnamese over the needle catheter was advanced into the peritoneal cavity and clear yellow colored fluid was aspirated. Once fluid was easily aspirated, the needle was removed leaving the catheter in place. The catheter was connected to vacuum containers and 3.2 liters of ascitic fluid was removed.     Estimated blood Loss:  Minimal    Specimen Removed:   Yes    Complications: None    Condition: Stable    Discharge Plan:  discharge home     Jessica Avery

## 2018-06-28 NOTE — H&P
OUTPATIENT HISTORY AND PHYSICAL      Today 6/28/2018     Indication/Symptoms:   Ramos Briggs is a 49 y.o. male with recurrent ascites who presents for an US-guided paracentesis. Current Meds:    Prior to Admission medications    Medication Sig Start Date End Date Taking? Authorizing Provider   cinacalcet (SENSIPAR) 60 mg tab Take  by mouth. Historical Provider   hydrALAZINE (APRESOLINE) 100 mg tablet Take 1 Tab by mouth three (3) times daily. 3/30/18   Charis Duque MD   allopurinol (ZYLOPRIM) 100 mg tablet TAKE 1 TABLET BY MOUTH TWICE DAILY 3/15/18   Danna Watson MD   cyclobenzaprine (FLEXERIL) 10 mg tablet Take 0.5 Tabs by mouth two (2) times a day. 5/4/17   Georgia Solis MD   lisinopril (PRINIVIL, ZESTRIL) 40 mg tablet TAKE 1 TABLET BY MOUTH DAILY 4/19/17   Georgia Solis MD   NIFEdipine ER (PROCARDIA XL) 90 mg ER tablet Take 90 mg by mouth daily. Historical Provider   thiamine (VITAMIN B-1) 100 mg tablet Take  by mouth daily. Historical Provider   doxazosin (CARDURA) 1 mg tablet TAKE 1 TABLET BY MOUTH EVERY NIGHT 4/10/17   Charis Duque MD   isosorbide mononitrate ER (IMDUR) 120 mg CR tablet TAKE 1 TABLET BY MOUTH EVERY MORNING 4/10/17   Charis Duque MD   sevelamer carbonate (RENVELA) 800 mg tab tab Take  by mouth three (3) times daily. Historical Provider   AMINO ACIDS/PROTEIN HYDROLYS (PRO-STAT 64 PO) Take  by mouth. Historical Provider   mupirocin calcium (BACTROBAN NASAL) 2 % nasal ointment by Both Nostrils route two (2) times a day. Indications: as  needed    Historical Provider   b complex-vitamin c-folic acid (NEPHROCAPS) 1 mg capsule Take 1 Cap by mouth daily.  [Request refills from PCP (Dr. Marcelo Priest)] 3/31/17   Georgia Solis MD   carvedilol (COREG) 25 mg tablet TAKE 1 TABLET BY MOUTH TWICE DAILY WITH MEALS 3/31/17   Georgia Solis MD   levETIRAcetam (KEPPRA) 250 mg tablet TAKE 1 TABLET BY MOUTH EVERY Monday, Wednesday, Friday AFTER [facility] DIALYSIS 12/8/16   Duarte Raza MD   levETIRAcetam (KEPPRA) 500 mg tablet TAKE 1 TABLET BY MOUTH DAILY 12/8/16   Duarte Raza MD   acetaminophen (TYLENOL) 500 mg tablet Take  by mouth every six (6) hours as needed for Pain. Historical Provider   simethicone (PHAZYME) 180 mg cap Take  by mouth. Historical Provider   cloNIDine HCl (CATAPRES) 0.3 mg tablet Take 1 Tab by mouth three (3) times daily. 9/22/16   Lyle Forde MD   oxyCODONE-acetaminophen (PERCOCET) 5-325 mg per tablet Take 1 Tab by mouth every eight (8) hours as needed. Max Daily Amount: 3 Tabs. 9/22/16   Lyle Forde MD   senna-docusate (PERICOLACE) 8.6-50 mg per tablet Take 1 Tab by mouth daily. 3/1/16   Dayana Berkowitz MD   aspirin delayed-release 81 mg tablet Take 1 tablet by mouth daily. 1/22/15   Candace Benitez MD   pantoprazole (PROTONIX) 40 mg tablet Take 1 Tab by mouth Daily (before breakfast). [Request refills from PCP (Dr. Moe Priest)] 5/8/13   Mike Izaguirre MD   cholecalciferol (VITAMIN D3) 1,000 unit tablet Take 2 Tabs by mouth two (2) times a day. [Request refills from PCP (Dr. Moe Priest)] 5/8/13   Mike Izaguirre MD       Allergies: Allergies   Allergen Reactions    Amlodipine Swelling    Nuts [Tree Nut] Swelling    Pcn [Penicillins] Unknown (comments)    Phenothiazines Other (comments)     Per father, \"He has a parkinsonian reaction to this medication. \"       Comorbid Conditions:    Past Medical History:   Diagnosis Date    Anemia in chronic kidney disease(285.21) 4/24/2013    Cardiomyopathy due to hypertension ([address] 4/24/2013    EF 20%    Edema of lower extremity 4/24/2013    End stage renal disease [address] 04/19/2013    HD since 9/2003    Hyperphosphatemia 4/24/2013    Hypertension     Hypertension, uncontrolled 4/24/2013    Personal history of atrial flutter 4/24/2013    Pulmonary hypertension ([facility] [address] 4/24/2013    Recommendation refused by patient 4/24/2013    Patient refuses dialysis (hemodialysis or peritoneal dialysis)    Secondary hyperparathyroidism of renal origin ([facility] 4/24/2013    Stroke ([facility] [address]     x2 1/2015    Vitamin D insufficiency 4/25/2013    Vitamin D 25-Hydroxy 20.7           Past Surgical History:   Procedure Laterality Date    HX CSF SHUNT  09/2016    HX HEART CATHETERIZATION  01/2015     Data:    There were no vitals taken for this visit.:  Recent Labs      06/28/18   1045   PLT  204     Recent Labs      06/28/18   1045   INR  1.3*   APTT  30.9       The H & P and/or progress notes and any available imaging were reviewed. The risks, indications and possible alternatives to the procedure, including doing nothing, were discussed and informed consent was obtained. Physical Exam:      Mental status:   Alert and oriented. Heart:   Regular rate. Lungs:  Normal respiratory effort. The patient is an appropriate candidate to undergo the planned procedure and sedation.     Jessica King

## 2018-07-11 ENCOUNTER — APPOINTMENT (OUTPATIENT)
Dept: ULTRASOUND IMAGING | Age: 48
End: 2018-07-11
Attending: INTERNAL MEDICINE
Payer: MEDICARE

## 2018-07-19 ENCOUNTER — HOSPITAL ENCOUNTER (OUTPATIENT)
Dept: ULTRASOUND IMAGING | Age: 48
Discharge: HOME | End: 2018-07-19
Attending: INTERNAL MEDICINE
Payer: MEDICARE

## 2018-07-19 DIAGNOSIS — R18.8 OTHER ASCITES: ICD-10-CM

## 2018-07-19 PROCEDURE — C1729 CATH, DRAINAGE: HCPCS

## 2018-07-19 RX ORDER — LIDOCAINE HYDROCHLORIDE 10 MG/ML
30 INJECTION, SOLUTION EPIDURAL; INFILTRATION; INTRACAUDAL; PERINEURAL ONCE
Status: ACTIVE | OUTPATIENT
Start: 2018-07-19 | End: 2018-07-20

## 2018-07-19 RX ORDER — LIDOCAINE HYDROCHLORIDE 10 MG/ML
INJECTION, SOLUTION EPIDURAL; INFILTRATION; INTRACAUDAL; PERINEURAL
Status: DISPENSED
Start: 2018-07-19 | End: 2018-07-20

## 2018-07-19 NOTE — H&P
OUTPATIENT HISTORY AND PHYSICAL      Today 7/19/2018     Indication/Symptoms:   Carol Lozada is a 49 y.o. male with recurrent ascites who presents for an US-guided paracentesis. Current Meds:    Prior to Admission medications    Medication Sig Start Date End Date Taking? Authorizing Provider   cinacalcet (SENSIPAR) 60 mg tab Take  by mouth. Historical Provider   hydrALAZINE (APRESOLINE) 100 mg tablet Take 1 Tab by mouth three (3) times daily. 3/30/18   Leanne Cortez MD   allopurinol (ZYLOPRIM) 100 mg tablet TAKE 1 TABLET BY MOUTH TWICE DAILY 3/15/18   Ritu Hendricks MD   cyclobenzaprine (FLEXERIL) 10 mg tablet Take 0.5 Tabs by mouth two (2) times a day. 5/4/17   Damari Salazar MD   lisinopril (PRINIVIL, ZESTRIL) 40 mg tablet TAKE 1 TABLET BY MOUTH DAILY 4/19/17   Damari Salazar MD   NIFEdipine ER (PROCARDIA XL) 90 mg ER tablet Take 90 mg by mouth daily. Historical Provider   thiamine (VITAMIN B-1) 100 mg tablet Take  by mouth daily. Historical Provider   doxazosin (CARDURA) 1 mg tablet TAKE 1 TABLET BY MOUTH EVERY NIGHT 4/10/17   Leanne Cortez MD   isosorbide mononitrate ER (IMDUR) 120 mg CR tablet TAKE 1 TABLET BY MOUTH EVERY MORNING 4/10/17   Leanne Cortez MD   sevelamer carbonate (RENVELA) 800 mg tab tab Take  by mouth three (3) times daily. Historical Provider   AMINO ACIDS/PROTEIN HYDROLYS (PRO-STAT 64 PO) Take  by mouth. Historical Provider   mupirocin calcium (BACTROBAN NASAL) 2 % nasal ointment by Both Nostrils route two (2) times a day. Indications: as  needed    Historical Provider   b complex-vitamin c-folic acid (NEPHROCAPS) 1 mg capsule Take 1 Cap by mouth daily.  [Request refills from PCP (Dr. Yuliana Priest)] 3/31/17   Damari Salazar MD   carvedilol (COREG) 25 mg tablet TAKE 1 TABLET BY MOUTH TWICE DAILY WITH MEALS 3/31/17   Damari Salazar MD   levETIRAcetam (KEPPRA) 250 mg tablet TAKE 1 TABLET BY MOUTH EVERY Monday, Wednesday, Friday AFTER [facility] DIALYSIS 12/8/16   Abril Gonzales MD   levETIRAcetam (KEPPRA) 500 mg tablet TAKE 1 TABLET BY MOUTH DAILY 12/8/16   Abril Gonzales MD   acetaminophen (TYLENOL) 500 mg tablet Take  by mouth every six (6) hours as needed for Pain. Historical Provider   simethicone (PHAZYME) 180 mg cap Take  by mouth. Historical Provider   cloNIDine HCl (CATAPRES) 0.3 mg tablet Take 1 Tab by mouth three (3) times daily. 9/22/16   Sevier MD Tala   oxyCODONE-acetaminophen (PERCOCET) 5-325 mg per tablet Take 1 Tab by mouth every eight (8) hours as needed. Max Daily Amount: 3 Tabs. 9/22/16   Sevier MD Tala   senna-docusate (PERICOLACE) 8.6-50 mg per tablet Take 1 Tab by mouth daily. 3/1/16   Royal Shaw MD   aspirin delayed-release 81 mg tablet Take 1 tablet by mouth daily. 1/22/15   Marla Herrera MD   pantoprazole (PROTONIX) 40 mg tablet Take 1 Tab by mouth Daily (before breakfast). [Request refills from PCP (Dr. Latonya Priest)] 5/8/13   Kaylee Alonso MD   cholecalciferol (VITAMIN D3) 1,000 unit tablet Take 2 Tabs by mouth two (2) times a day. [Request refills from PCP (Dr. Latonya Priest)] 5/8/13   Kaylee Alonso MD       Allergies: Allergies   Allergen Reactions    Amlodipine Swelling    Nuts [Tree Nut] Swelling    Pcn [Penicillins] Unknown (comments)    Phenothiazines Other (comments)     Per father, \"He has a parkinsonian reaction to this medication. \"       Comorbid Conditions:    Past Medical History:   Diagnosis Date    Anemia in chronic kidney disease(285.21) 4/24/2013    Cardiomyopathy due to hypertension ([address] 4/24/2013    EF 20%    Edema of lower extremity 4/24/2013    End stage renal disease ([facility] [address] 04/19/2013    HD since 9/2003    Hyperphosphatemia 4/24/2013    Hypertension     Hypertension, uncontrolled 4/24/2013    Personal history of atrial flutter 4/24/2013    Pulmonary hypertension ([facility] [address] 4/24/2013    Recommendation refused by patient 4/24/2013    Patient refuses dialysis (hemodialysis or peritoneal dialysis)    Secondary hyperparathyroidism of renal origin ([facility] [address] 4/24/2013    Stroke ([facility] [address]     x2 1/2015    Vitamin D insufficiency 4/25/2013    Vitamin D 25-Hydroxy 20.7           Past Surgical History:   Procedure Laterality Date    HX CSF SHUNT  09/2016    HX HEART CATHETERIZATION  01/2015     Data:    There were no vitals taken for this visit.:  No results for input(s): PLT, PLTEXT in the last 72 hours. No lab exists for component:  HCT  No results for input(s): INR, APTT in the last 72 hours. No lab exists for component: PT, INREXT    The H & P and/or progress notes and any available imaging were reviewed. The risks, indications and possible alternatives to the procedure, including doing nothing, were discussed and informed consent was obtained. Physical Exam:      Mental status:   Alert and oriented. Heart:   Regular rate. Lungs:  Normal respiratory effort. The patient is an appropriate candidate to undergo the planned procedure and sedation.     Jessica Russ

## 2018-07-19 NOTE — PROCEDURES
RADIOLOGY POST PARACENTESIS NOTE     July 19, 2018       2:04 PM     Preoperative Diagnosis:   Ascites    Postoperative Diagnosis:  Same. :  Catia Schmidt PA-C    Assistant:  None. Type of Anesthesia: 1% plain lidocaine    Procedure/Description:  US-Guided paracentesis    Findings:  Using ultrasound guidance the largest pocket of peritoneal fluid was localized and marked at the right lower quadrant. The patient was prepped and draped in the usual fashion. 1% Lidocaine was infiltrated locally. A 5 German over the needle catheter was advanced into the peritoneal cavity and rand colored fluid was aspirated but ceased after 350cc so attention was then turned to the left lower quadrant. Using ultrasound guidance the largest pocket of peritoneal fluid was localized and marked at the left lower quadrant. The patient was prepped and draped in the usual fashion. 1% Lidocaine was infiltrated locally. A 5 German over the needle catheter was advanced into the peritoneal cavity and rand colored fluid was aspirated. Once fluid was easily aspirated, the needle was removed leaving the catheter in place. The catheter was connected to vacuum containers and 2.2 liters of ascitic fluid was removed.     Estimated blood Loss:  Minimal    Specimen Removed:   Yes    Complications: None    Condition: Stable    Discharge Plan:  discharge home     Jessica Blair

## 2018-07-19 NOTE — H&P
OUTPATIENT HISTORY AND PHYSICAL        Today 7/19/2018      Indication/Symptoms:   Evette Willson is a 49 y.o. male with recurrent ascites who presents for an US-guided paracentesis.      Current Meds:            Prior to Admission medications    Medication Sig Start Date End Date Taking? Authorizing Provider   cinacalcet (SENSIPAR) 60 mg tab Take  by mouth.       Historical Provider   hydrALAZINE (APRESOLINE) 100 mg tablet Take 1 Tab by mouth three (3) times daily. 3/30/18     Sia Coe MD   allopurinol (ZYLOPRIM) 100 mg tablet TAKE 1 TABLET BY MOUTH TWICE DAILY 3/15/18     Carrillo Mejia MD   cyclobenzaprine (FLEXERIL) 10 mg tablet Take 0.5 Tabs by mouth two (2) times a day. 5/4/17     Robert Maciel MD   lisinopril (PRINIVIL, ZESTRIL) 40 mg tablet TAKE 1 TABLET BY MOUTH DAILY 4/19/17     Robert Maciel MD   NIFEdipine ER (PROCARDIA XL) 90 mg ER tablet Take 90 mg by mouth daily.       Historical Provider   thiamine (VITAMIN B-1) 100 mg tablet Take  by mouth daily.       Historical Provider   doxazosin (CARDURA) 1 mg tablet TAKE 1 TABLET BY MOUTH EVERY NIGHT 4/10/17     Sia Coe MD   isosorbide mononitrate ER (IMDUR) 120 mg CR tablet TAKE 1 TABLET BY MOUTH EVERY MORNING 4/10/17     Sia Coe MD   sevelamer carbonate (RENVELA) 800 mg tab tab Take  by mouth three (3) times daily.       Historical Provider   AMINO ACIDS/PROTEIN HYDROLYS (PRO-STAT 64 PO) Take  by mouth.       Historical Provider   mupirocin calcium (BACTROBAN NASAL) 2 % nasal ointment by Both Nostrils route two (2) times a day. Indications: as  needed       Historical Provider   b complex-vitamin c-folic acid (NEPHROCAPS) 1 mg capsule Take 1 Cap by mouth daily.  [Request refills from PCP (Dr. Lizzie Priest)] 3/31/17     Robert Maciel MD   carvedilol (COREG) 25 mg tablet TAKE 1 TABLET BY MOUTH TWICE DAILY WITH MEALS 3/31/17     Robert Maciel MD   levETIRAcetam (KEPPRA) 250 mg tablet TAKE 1 TABLET BY MOUTH EVERY Monday, Wednesday, Friday AFTER EACH DIALYSIS 12/8/16     Morgan Gibbons MD   levETIRAcetam (KEPPRA) 500 mg tablet TAKE 1 TABLET BY MOUTH DAILY 12/8/16     Morgan Gibbons MD   acetaminophen (TYLENOL) 500 mg tablet Take  by mouth every six (6) hours as needed for Pain.       Historical Provider   simethicone (PHAZYME) 180 mg cap Take  by mouth.       Historical Provider   cloNIDine HCl (CATAPRES) 0.3 mg tablet Take 1 Tab by mouth three (3) times daily. 9/22/16     Marcia Fowler MD   oxyCODONE-acetaminophen (PERCOCET) 5-325 mg per tablet Take 1 Tab by mouth every eight (8) hours as needed. Max Daily Amount: 3 Tabs. 9/22/16     Marcia Fowler MD   senna-docusate (PERICOLACE) 8.6-50 mg per tablet Take 1 Tab by mouth daily. 3/1/16     Maria Arroyo MD   aspirin delayed-release 81 mg tablet Take 1 tablet by mouth daily. 1/22/15     Cathy Mercedes MD   pantoprazole (PROTONIX) 40 mg tablet Take 1 Tab by mouth Daily (before breakfast). [Request refills from PCP (Dr. Paige Priest)] 5/8/13     Jael Sanches MD   cholecalciferol (VITAMIN D3) 1,000 unit tablet Take 2 Tabs by mouth two (2) times a day. [Request refills from PCP (Dr. Paige Priest)] 5/8/13     Jael Sanches MD         Allergies:             Allergies   Allergen Reactions    Amlodipine Swelling    Nuts [place] Nut] Swelling    Pcn [Penicillins] Unknown (comments)    Phenothiazines Other (comments)       Per father, Linsey Nina has a parkinsonian reaction to this medication. \"         Comorbid Conditions:         Past Medical History:   Diagnosis Date    Anemia in chronic kidney disease(285.21) 4/24/2013    [facility] to [facility] 4/24/2013     EF 20%    Edema of lower extremity 4/24/2013    End stage renal disease ([facility] 04/19/2013     HD since 9/2003    Hyperphosphatemia 4/24/2013    Hypertension      Hypertension, uncontrolled 4/24/2013    Personal history of atrial flutter 4/24/2013    Pulmonary hypertension ([facility] 4/24/2013  Recommendation refused by patient 4/24/2013     Patient refuses dialysis (hemodialysis or peritoneal dialysis)    Secondary hyperparathyroidism of renal origin ([facility] [address] 4/24/2013    Stroke [facility])       x2 1/2015    Vitamin D insufficiency 4/25/2013     Vitamin D 25-Hydroxy 20.7              Past Surgical History:   Procedure Laterality Date    HX CSF SHUNT   09/2016    HX HEART CATHETERIZATION   01/2015      Data:    There were no vitals taken for this visit.:  No results for input(s): PLT, PLTEXT in the last 72 hours.     No lab exists for component:  HCT  No results for input(s): INR, APTT in the last 72 hours.     No lab exists for component: PT, INREXT     The H & P and/or progress notes and any available imaging were reviewed. The risks, indications and possible alternatives to the procedure, including doing nothing, were discussed and informed consent was obtained.     Physical Exam:                            Mental status:   Alert and oriented. Heart:   Regular rate.                         Lungs:  Normal respiratory effort.     The patient is an appropriate candidate to undergo the planned procedure.     GILBERTO Danielle

## 2018-07-27 ENCOUNTER — TELEPHONE (OUTPATIENT)
Dept: CARDIOLOGY CLINIC | Age: 48
End: 2018-07-27

## 2018-07-27 NOTE — TELEPHONE ENCOUNTER
Paper message Per Dr. Carmelo Gil get HR/BP bid x 1 week and show him. Called and left message with patient with instructions and if any questions to call the office.

## 2018-08-09 ENCOUNTER — HOSPITAL ENCOUNTER (OUTPATIENT)
Dept: LAB | Age: 48
Discharge: HOME | End: 2018-08-09
Attending: INTERNAL MEDICINE
Payer: MEDICARE

## 2018-08-09 ENCOUNTER — HOSPITAL ENCOUNTER (OUTPATIENT)
Dept: ULTRASOUND IMAGING | Age: 48
Discharge: HOME | End: 2018-08-09
Attending: INTERNAL MEDICINE
Payer: MEDICARE

## 2018-08-09 DIAGNOSIS — R18.8 OTHER ASCITES: ICD-10-CM

## 2018-08-09 LAB
ERYTHROCYTE [DISTWIDTH] IN BLOOD BY AUTOMATED COUNT: 17.5 % (ref 11.6–14.5)
HCT VFR BLD AUTO: 24.7 % (ref 36–48)
HGB BLD-MCNC: 7.9 G/DL (ref 13–16)
INR PPP: 1.3 (ref 0.8–1.2)
MCH RBC QN AUTO: 28.6 PG (ref 24–34)
MCHC RBC AUTO-ENTMCNC: 32 G/DL (ref 31–37)
MCV RBC AUTO: 89.5 FL (ref 74–97)
PLATELET # BLD AUTO: 160 K/UL (ref 135–420)
PMV BLD AUTO: 11.2 FL (ref 9.2–11.8)
PROTHROMBIN TIME: 16.4 SEC (ref 11.5–15.2)
RBC # BLD AUTO: 2.76 M/UL (ref 4.7–5.5)
WBC # BLD AUTO: 6.1 K/UL (ref 4.6–13.2)

## 2018-08-09 PROCEDURE — 36415 COLL VENOUS BLD VENIPUNCTURE: CPT | Performed by: INTERNAL MEDICINE

## 2018-08-09 PROCEDURE — C1729 CATH, DRAINAGE: HCPCS

## 2018-08-09 PROCEDURE — 85610 PROTHROMBIN TIME: CPT | Performed by: INTERNAL MEDICINE

## 2018-08-09 PROCEDURE — 85027 COMPLETE CBC AUTOMATED: CPT | Performed by: INTERNAL MEDICINE

## 2018-08-09 NOTE — PROCEDURES
RADIOLOGY POST PARACENTESIS NOTE     August 9, 2018       1:24 PM     Preoperative Diagnosis:   Ascites    Postoperative Diagnosis:  Same. :  Madi Medina PA-C    Assistant:  None. Type of Anesthesia: 1% plain lidocaine    Procedure/Description:  US-Guided paracentesis    Findings:  Using ultrasound guidance the largest pocket of peritoneal fluid was localized and marked at the left lower quadrant. The patient was prepped and draped in the usual fashion. 1% Lidocaine was infiltrated locally. A 5 Upper sorbian over the needle catheter was advanced into the peritoneal cavity and dark yellow colored fluid was aspirated. Once fluid was easily aspirated, the needle was removed leaving the catheter in place. The catheter was connected to vacuum containers and 2.7 liters of ascitic fluid was removed.     Estimated blood Loss:  Minimal    Specimen Removed:   No    Complications: None    Condition: Stable    Discharge Plan:  discharge home     Jessica Ahmadi

## 2018-08-09 NOTE — H&P
OUTPATIENT HISTORY AND PHYSICAL      Today 8/9/2018     Indication/Symptoms:   Edvin Escobar is a 49 y.o. male with recurrent ascites who presents for an US-guided paracentesis. Current Meds:    Prior to Admission medications    Medication Sig Start Date End Date Taking? Authorizing Provider   cinacalcet (SENSIPAR) 60 mg tab Take  by mouth. Historical Provider   hydrALAZINE (APRESOLINE) 100 mg tablet Take 1 Tab by mouth three (3) times daily. 3/30/18   Lionel Beasley MD   allopurinol (ZYLOPRIM) 100 mg tablet TAKE 1 TABLET BY MOUTH TWICE DAILY 3/15/18   Stephane Browne MD   cyclobenzaprine (FLEXERIL) 10 mg tablet Take 0.5 Tabs by mouth two (2) times a day. 5/4/17   Dayana Berkowitz MD   lisinopril (PRINIVIL, ZESTRIL) 40 mg tablet TAKE 1 TABLET BY MOUTH DAILY 4/19/17   Dayana Berkowitz MD   NIFEdipine ER (PROCARDIA XL) 90 mg ER tablet Take 90 mg by mouth daily. Historical Provider   thiamine (VITAMIN B-1) 100 mg tablet Take  by mouth daily. Historical Provider   doxazosin (CARDURA) 1 mg tablet TAKE 1 TABLET BY MOUTH EVERY NIGHT 4/10/17   Lionel Beasley MD   isosorbide mononitrate ER (IMDUR) 120 mg CR tablet TAKE 1 TABLET BY MOUTH EVERY MORNING 4/10/17   Lionel Beasley MD   sevelamer carbonate (RENVELA) 800 mg tab tab Take  by mouth three (3) times daily. Historical Provider   AMINO ACIDS/PROTEIN HYDROLYS (PRO-STAT 64 PO) Take  by mouth. Historical Provider   mupirocin calcium (BACTROBAN NASAL) 2 % nasal ointment by Both Nostrils route two (2) times a day. Indications: as  needed    Historical Provider   b complex-vitamin c-folic acid (NEPHROCAPS) 1 mg capsule Take 1 Cap by mouth daily.  [Request refills from PCP (Dr. Moe Priest)] 3/31/17   Dayana Berkowitz MD   carvedilol (COREG) 25 mg tablet TAKE 1 TABLET BY MOUTH TWICE DAILY WITH MEALS 3/31/17   Dayana Berkowitz MD   levETIRAcetam (KEPPRA) 250 mg tablet TAKE 1 TABLET BY MOUTH EVERY Monday, Wednesday, Friday AFTER [facility] DIALYSIS 12/8/16   Jude Jasso MD   levETIRAcetam (KEPPRA) 500 mg tablet TAKE 1 TABLET BY MOUTH DAILY 12/8/16   Jude Jasso MD   acetaminophen (TYLENOL) 500 mg tablet Take  by mouth every six (6) hours as needed for Pain. Historical Provider   simethicone (PHAZYME) 180 mg cap Take  by mouth. Historical Provider   cloNIDine HCl (CATAPRES) 0.3 mg tablet Take 1 Tab by mouth three (3) times daily. 9/22/16   Melchor Khanna MD   oxyCODONE-acetaminophen (PERCOCET) 5-325 mg per tablet Take 1 Tab by mouth every eight (8) hours as needed. Max Daily Amount: 3 Tabs. 9/22/16   Melchor Khanna MD   senna-docusate (PERICOLACE) 8.6-50 mg per tablet Take 1 Tab by mouth daily. 3/1/16   Robert Maciel MD   aspirin delayed-release 81 mg tablet Take 1 tablet by mouth daily. 1/22/15   Saleem García MD   pantoprazole (PROTONIX) 40 mg tablet Take 1 Tab by mouth Daily (before breakfast). [Request refills from PCP (Dr. Lizzie Priest)] 5/8/13   Anmol Ruiz MD   cholecalciferol (VITAMIN D3) 1,000 unit tablet Take 2 Tabs by mouth two (2) times a day. [Request refills from PCP (Dr. Lizzie Priest)] 5/8/13   Anmol Ruiz MD       Allergies: Allergies   Allergen Reactions    Amlodipine Swelling    Nuts [Tree Nut] Swelling    Pcn [Penicillins] Unknown (comments)    Phenothiazines Other (comments)     Per father, \"He has a parkinsonian reaction to this medication. \"       Comorbid Conditions:    Past Medical History:   Diagnosis Date    Anemia in chronic kidney disease(285.21) 4/24/2013    Cardiomyopathy due to hypertension ([address] 4/24/2013    EF 20%    Edema of lower extremity 4/24/2013    End stage renal disease [address] [address] 04/19/2013    HD since 9/2003    Hyperphosphatemia 4/24/2013    Hypertension     Hypertension, uncontrolled 4/24/2013    Personal history of atrial flutter 4/24/2013    Pulmonary hypertension ([facility] [address] 4/24/2013    Recommendation refused by patient 4/24/2013    Patient refuses dialysis (hemodialysis or peritoneal dialysis)    Secondary hyperparathyroidism of renal origin ([facility] [address] 4/24/2013    Stroke ([facility] [address]     x2 1/2015    Vitamin D insufficiency 4/25/2013    Vitamin D 25-Hydroxy 20.7           Past Surgical History:   Procedure Laterality Date    HX CSF SHUNT  09/2016    HX HEART CATHETERIZATION  01/2015     Data:    There were no vitals taken for this visit.:  Recent Labs      08/09/18   0936   PLT  160     Recent Labs      08/09/18   0936   INR  1.3*       The H & P and/or progress notes and any available imaging were reviewed. The risks, indications and possible alternatives to the procedure, including doing nothing, were discussed and informed consent was obtained. Physical Exam:      Mental status:   Alert and oriented. Heart:   Regular rate. Lungs:  Normal respiratory effort. The patient is an appropriate candidate to undergo the planned procedure.      Jessica Torres

## 2018-08-30 ENCOUNTER — HOSPITAL ENCOUNTER (OUTPATIENT)
Dept: ULTRASOUND IMAGING | Age: 48
Discharge: HOME | End: 2018-08-30
Attending: INTERNAL MEDICINE
Payer: MEDICARE

## 2018-08-30 DIAGNOSIS — R18.8 OTHER ASCITES: ICD-10-CM

## 2018-08-30 PROCEDURE — C1729 CATH, DRAINAGE: HCPCS

## 2018-08-30 NOTE — PROCEDURES
RADIOLOGY POST PROCEDURE NOTE     August 30, 2018       6:00 PM     Preoperative Diagnosis:   Ascites. Postoperative Diagnosis:  Same. :  Dr. Shanell Luevano    Assistant:  None. Type of Anesthesia: 1% plain lidocaine    Procedure/Description:  US paracentesis. Findings:   No bleeding. Estimated blood Loss:  Minimal    Specimen Removed:   no    Blood transfusions:  None. Implants:  None.     Complications: None    Condition: Stable    Discharge Plan:  discharge home     Colon MD Oumar

## 2018-09-20 ENCOUNTER — HOSPITAL ENCOUNTER (OUTPATIENT)
Dept: ULTRASOUND IMAGING | Age: 48
Discharge: HOME | End: 2018-09-20
Attending: INTERNAL MEDICINE
Payer: MEDICARE

## 2018-09-20 ENCOUNTER — HOSPITAL ENCOUNTER (OUTPATIENT)
Dept: PREADMISSION TESTING | Age: 48
Discharge: HOME | End: 2018-09-20

## 2018-09-20 DIAGNOSIS — R18.8 ASCITIC FLUID: ICD-10-CM

## 2018-09-20 DIAGNOSIS — R18.8 OTHER ASCITES: ICD-10-CM

## 2018-09-20 LAB
ANION GAP SERPL CALC-SCNC: 10 MMOL/L (ref 3–18)
APTT PPP: 34.9 SEC (ref 23–36.4)
BUN SERPL-MCNC: 48 MG/DL (ref 7–18)
BUN/CREAT SERPL: 7 (ref 12–20)
CALCIUM SERPL-MCNC: 8.2 MG/DL (ref 8.5–10.1)
CHLORIDE SERPL-SCNC: 104 MMOL/L (ref 100–108)
CO2 SERPL-SCNC: 31 MMOL/L (ref 21–32)
CREAT SERPL-MCNC: 6.87 MG/DL (ref 0.6–1.3)
ERYTHROCYTE [DISTWIDTH] IN BLOOD BY AUTOMATED COUNT: 16.1 % (ref 11.6–14.5)
GLUCOSE SERPL-MCNC: 83 MG/DL (ref 74–99)
HCT VFR BLD AUTO: 29.4 % (ref 36–48)
HGB BLD-MCNC: 9.3 G/DL (ref 13–16)
INR PPP: 1.3 (ref 0.8–1.2)
MCH RBC QN AUTO: 28.8 PG (ref 24–34)
MCHC RBC AUTO-ENTMCNC: 31.6 G/DL (ref 31–37)
MCV RBC AUTO: 91 FL (ref 74–97)
PLATELET # BLD AUTO: 176 K/UL (ref 135–420)
PMV BLD AUTO: 11.5 FL (ref 9.2–11.8)
POTASSIUM SERPL-SCNC: 4.3 MMOL/L (ref 3.5–5.5)
PROTHROMBIN TIME: 16.4 SEC (ref 11.5–15.2)
RBC # BLD AUTO: 3.23 M/UL (ref 4.7–5.5)
SODIUM SERPL-SCNC: 145 MMOL/L (ref 136–145)
WBC # BLD AUTO: 6.1 K/UL (ref 4.6–13.2)

## 2018-09-20 PROCEDURE — 49083 ABD PARACENTESIS W/IMAGING: CPT

## 2018-09-20 PROCEDURE — 36415 COLL VENOUS BLD VENIPUNCTURE: CPT | Performed by: PHYSICIAN ASSISTANT

## 2018-09-20 PROCEDURE — 85730 THROMBOPLASTIN TIME PARTIAL: CPT | Performed by: PHYSICIAN ASSISTANT

## 2018-09-20 PROCEDURE — 74011250636 HC RX REV CODE- 250/636: Performed by: PHYSICIAN ASSISTANT

## 2018-09-20 PROCEDURE — 80048 BASIC METABOLIC PNL TOTAL CA: CPT | Performed by: PHYSICIAN ASSISTANT

## 2018-09-20 PROCEDURE — 85610 PROTHROMBIN TIME: CPT | Performed by: PHYSICIAN ASSISTANT

## 2018-09-20 PROCEDURE — 85027 COMPLETE CBC AUTOMATED: CPT | Performed by: PHYSICIAN ASSISTANT

## 2018-09-20 RX ORDER — LIDOCAINE HYDROCHLORIDE 10 MG/ML
30 INJECTION, SOLUTION EPIDURAL; INFILTRATION; INTRACAUDAL; PERINEURAL
Status: COMPLETED | OUTPATIENT
Start: 2018-09-20 | End: 2018-09-20

## 2018-09-20 RX ADMIN — LIDOCAINE HYDROCHLORIDE 10 ML: 10 INJECTION, SOLUTION EPIDURAL; INFILTRATION; INTRACAUDAL; PERINEURAL at 11:12

## 2018-09-20 NOTE — PROCEDURES
RADIOLOGY POST PARACENTESIS NOTE     September 20, 2018       1:28 PM     Preoperative Diagnosis:   Ascites    Postoperative Diagnosis:  Same. :  Dai Gomez PA-C    Assistant:  None. Type of Anesthesia: 1% plain lidocaine    Procedure/Description:  US-Guided paracentesis    Findings:  Using ultrasound guidance the largest pocket of peritoneal fluid was localized and marked at the right lower quadrant. The patient was prepped and draped in the usual fashion. 1% Lidocaine was infiltrated locally. A 5 Vietnamese over the needle catheter was advanced into the peritoneal cavity and clear yellow colored fluid was aspirated. Once fluid was easily aspirated, the needle was removed leaving the catheter in place. The catheter was connected to vacuum containers and 500mL of ascitic fluid was removed.     Estimated blood Loss:  Minimal    Specimen Removed:   No    Complications: None    Condition: Stable    Discharge Plan:  discharge home     Jessica Gaona

## 2018-09-20 NOTE — H&P
OUTPATIENT HISTORY AND PHYSICAL      Today 9/20/2018     Indication/Symptoms:   Edvin Escobar is a 49 y.o. male with recurrent ascites who presents for an US-guided paracentesis. Current Meds:    Prior to Admission medications    Medication Sig Start Date End Date Taking? Authorizing Provider   cinacalcet (SENSIPAR) 60 mg tab Take  by mouth. Historical Provider   hydrALAZINE (APRESOLINE) 100 mg tablet Take 1 Tab by mouth three (3) times daily. 3/30/18   Lionel Beasley MD   allopurinol (ZYLOPRIM) 100 mg tablet TAKE 1 TABLET BY MOUTH TWICE DAILY 3/15/18   Stephane Browne MD   cyclobenzaprine (FLEXERIL) 10 mg tablet Take 0.5 Tabs by mouth two (2) times a day. 5/4/17   Dayana Berkowitz MD   lisinopril (PRINIVIL, ZESTRIL) 40 mg tablet TAKE 1 TABLET BY MOUTH DAILY 4/19/17   Dayana Berkowitz MD   NIFEdipine ER (PROCARDIA XL) 90 mg ER tablet Take 90 mg by mouth daily. Historical Provider   thiamine (VITAMIN B-1) 100 mg tablet Take  by mouth daily. Historical Provider   doxazosin (CARDURA) 1 mg tablet TAKE 1 TABLET BY MOUTH EVERY NIGHT 4/10/17   Lionel Beasley MD   isosorbide mononitrate ER (IMDUR) 120 mg CR tablet TAKE 1 TABLET BY MOUTH EVERY MORNING 4/10/17   Lionel Beasley MD   sevelamer carbonate (RENVELA) 800 mg tab tab Take  by mouth three (3) times daily. Historical Provider   AMINO ACIDS/PROTEIN HYDROLYS (PRO-STAT 64 PO) Take  by mouth. Historical Provider   mupirocin calcium (BACTROBAN NASAL) 2 % nasal ointment by Both Nostrils route two (2) times a day. Indications: as  needed    Historical Provider   b complex-vitamin c-folic acid (NEPHROCAPS) 1 mg capsule Take 1 Cap by mouth daily.  [Request refills from PCP (Dr. Moe Priest)] 3/31/17   Dayana Berkowitz MD   carvedilol (COREG) 25 mg tablet TAKE 1 TABLET BY MOUTH TWICE DAILY WITH MEALS 3/31/17   Dayana Berkowitz MD   levETIRAcetam (KEPPRA) 250 mg tablet TAKE 1 TABLET BY MOUTH EVERY Monday, Wednesday, Friday AFTER [facility] DIALYSIS 12/8/16   Rissa Portillo MD   levETIRAcetam (KEPPRA) 500 mg tablet TAKE 1 TABLET BY MOUTH DAILY 12/8/16   Rissa Portillo MD   acetaminophen (TYLENOL) 500 mg tablet Take  by mouth every six (6) hours as needed for Pain. Historical Provider   simethicone (PHAZYME) 180 mg cap Take  by mouth. Historical Provider   cloNIDine HCl (CATAPRES) 0.3 mg tablet Take 1 Tab by mouth three (3) times daily. 9/22/16   Jennie Robbins MD   oxyCODONE-acetaminophen (PERCOCET) 5-325 mg per tablet Take 1 Tab by mouth every eight (8) hours as needed. Max Daily Amount: 3 Tabs. 9/22/16   Jennie Robbins MD   senna-docusate (PERICOLACE) 8.6-50 mg per tablet Take 1 Tab by mouth daily. 3/1/16   Daniel Dhaliwal MD   aspirin delayed-release 81 mg tablet Take 1 tablet by mouth daily. 1/22/15   Sheryl Barrett MD   pantoprazole (PROTONIX) 40 mg tablet Take 1 Tab by mouth Daily (before breakfast). [Request refills from PCP (Dr. Trina Priest)] 5/8/13   Sunshine Abbott MD   cholecalciferol (VITAMIN D3) 1,000 unit tablet Take 2 Tabs by mouth two (2) times a day. [Request refills from PCP (Dr. Trina Priest)] 5/8/13   Sunshine Abbott MD       Allergies: Allergies   Allergen Reactions    Amlodipine Swelling    Nuts [Tree Nut] Swelling    Pcn [Penicillins] Unknown (comments)    Phenothiazines Other (comments)     Per father, \"He has a parkinsonian reaction to this medication. \"       Comorbid Conditions:    Past Medical History:   Diagnosis Date    Anemia in chronic kidney disease(285.21) 4/24/2013    Cardiomyopathy due to hypertension ([address] 4/24/2013    EF 20%    Edema of lower extremity 4/24/2013    End stage renal disease ([address] 04/19/2013    HD since 9/2003    Hyperphosphatemia 4/24/2013    Hypertension     Hypertension, uncontrolled 4/24/2013    Personal history of atrial flutter 4/24/2013    Pulmonary hypertension ([facility] [address] 4/24/2013    Recommendation refused by patient 4/24/2013    Patient refuses dialysis (hemodialysis or peritoneal dialysis)    Secondary hyperparathyroidism of renal origin ([facility] [address] 4/24/2013    Stroke ([facility] [address]     x2 1/2015    Vitamin D insufficiency 4/25/2013    Vitamin D 25-Hydroxy 20.7           Past Surgical History:   Procedure Laterality Date    HX CSF SHUNT  09/2016    HX HEART CATHETERIZATION  01/2015     Data:    There were no vitals taken for this visit.:  Recent Labs      09/20/18   0900   PLT  176     Recent Labs      09/20/18   0900   INR  1.3*   APTT  34.9       The H & P and/or progress notes and any available imaging were reviewed. The risks, indications and possible alternatives to the procedure, including doing nothing, were discussed and informed consent was obtained. Physical Exam:      Mental status:   Alert and oriented. Heart:   Regular rate. Lungs:  Normal respiratory effort. The patient is an appropriate candidate to undergo the planned procedure.      Jessica Cohn

## 2018-10-04 ENCOUNTER — HOSPITAL ENCOUNTER (OUTPATIENT)
Dept: ULTRASOUND IMAGING | Age: 48
Discharge: HOME | End: 2018-10-04
Attending: INTERNAL MEDICINE
Payer: MEDICARE

## 2018-10-04 DIAGNOSIS — R18.8 OTHER ASCITES: ICD-10-CM

## 2018-10-04 PROCEDURE — C1729 CATH, DRAINAGE: HCPCS

## 2018-10-04 NOTE — H&P
OUTPATIENT HISTORY AND PHYSICAL      Today 10/4/2018     Indication/Symptoms:   Stephy Agustin is a 51 y.o. male with recurrent ascites who presents for an US-guided paracentesis. Current Meds:    Prior to Admission medications    Medication Sig Start Date End Date Taking? Authorizing Provider   cinacalcet (SENSIPAR) 60 mg tab Take  by mouth. Historical Provider   hydrALAZINE (APRESOLINE) 100 mg tablet Take 1 Tab by mouth three (3) times daily. 3/30/18   Samuel Holloway MD   allopurinol (ZYLOPRIM) 100 mg tablet TAKE 1 TABLET BY MOUTH TWICE DAILY 3/15/18   [id number] Eben Henderson MD   cyclobenzaprine (FLEXERIL) 10 mg tablet Take 0.5 Tabs by mouth two (2) times a day. 5/4/17   Jud Barrett MD   lisinopril (PRINIVIL, ZESTRIL) 40 mg tablet TAKE 1 TABLET BY MOUTH DAILY 4/19/17   Jud Barrett MD   NIFEdipine ER (PROCARDIA XL) 90 mg ER tablet Take 90 mg by mouth daily. Historical Provider   thiamine (VITAMIN B-1) 100 mg tablet Take  by mouth daily. Historical Provider   doxazosin (CARDURA) 1 mg tablet TAKE 1 TABLET BY MOUTH EVERY NIGHT 4/10/17   Samuel Holloway MD   isosorbide mononitrate ER (IMDUR) 120 mg CR tablet TAKE 1 TABLET BY MOUTH EVERY MORNING 4/10/17   Samuel Holloway MD   sevelamer carbonate (RENVELA) 800 mg tab tab Take  by mouth three (3) times daily. Historical Provider   AMINO ACIDS/PROTEIN HYDROLYS (PRO-STAT 64 PO) Take  by mouth. Historical Provider   mupirocin calcium (BACTROBAN NASAL) 2 % nasal ointment by Both Nostrils route two (2) times a day. Indications: as  needed    Historical Provider   b complex-vitamin c-folic acid (NEPHROCAPS) 1 mg capsule Take 1 Cap by mouth daily.  [Request refills from PCP (Dr. Maribel Priest)] 3/31/17   Jud Barrett MD   carvedilol (COREG) 25 mg tablet TAKE 1 TABLET BY MOUTH TWICE DAILY WITH MEALS 3/31/17   Jud Barrett MD   levETIRAcetam (KEPPRA) 250 mg tablet TAKE 1 TABLET BY MOUTH EVERY Monday, Wednesday, Friday AFTER [facility] DIALYSIS 12/8/16   Swapna Toro MD   levETIRAcetam (KEPPRA) 500 mg tablet TAKE 1 TABLET BY MOUTH DAILY 12/8/16   Swapna Toro MD   acetaminophen (TYLENOL) 500 mg tablet Take  by mouth every six (6) hours as needed for Pain. Historical Provider   simethicone (PHAZYME) 180 mg cap Take  by mouth. Historical Provider   cloNIDine HCl (CATAPRES) 0.3 mg tablet Take 1 Tab by mouth three (3) times daily. 9/22/16   Haroldo Galarza MD   oxyCODONE-acetaminophen (PERCOCET) 5-325 mg per tablet Take 1 Tab by mouth every eight (8) hours as needed. Max Daily Amount: 3 Tabs. 9/22/16   Haroldo Galarza MD   senna-docusate (PERICOLACE) 8.6-50 mg per tablet Take 1 Tab by mouth daily. 3/1/16   Radha Abad MD   aspirin delayed-release 81 mg tablet Take 1 tablet by mouth daily. 1/22/15   Irene Diehl MD   pantoprazole (PROTONIX) 40 mg tablet Take 1 Tab by mouth Daily (before breakfast). [Request refills from PCP (Dr. Lucille Priest)] 5/8/13   Aiyana Poole MD   cholecalciferol (VITAMIN D3) 1,000 unit tablet Take 2 Tabs by mouth two (2) times a day. [Request refills from PCP (Dr. Lucille Priest)] 5/8/13   Aiyana Poole MD       Allergies: Allergies   Allergen Reactions    Amlodipine Swelling    Nuts [Tree Nut] Swelling    Pcn [Penicillins] Unknown (comments)    Phenothiazines Other (comments)     Per father, \"He has a parkinsonian reaction to this medication. \"       Comorbid Conditions:    Past Medical History:   Diagnosis Date    Anemia in chronic kidney disease(285.21) 4/24/2013    Cardiomyopathy due to hypertension ([facility] [address] 4/24/2013    EF 20%    Edema of lower extremity 4/24/2013    End stage renal disease ([facility] [address] 04/19/2013    HD since 9/2003    Hyperphosphatemia 4/24/2013    Hypertension     Hypertension, uncontrolled 4/24/2013    Personal history of atrial flutter 4/24/2013    Pulmonary hypertension ([facility] [address] 4/24/2013    Recommendation refused by patient 4/24/2013    Patient refuses dialysis (hemodialysis or peritoneal dialysis)    Secondary hyperparathyroidism of renal origin ([facility] [address] 4/24/2013    Stroke ([facility] [address]     x2 1/2015    Vitamin D insufficiency 4/25/2013    Vitamin D 25-Hydroxy 20.7           Past Surgical History:   Procedure Laterality Date    HX CSF SHUNT  09/2016    HX HEART CATHETERIZATION  01/2015     Data:    There were no vitals taken for this visit.:  No results for input(s): PLT, PLTEXT in the last 72 hours. No lab exists for component:  HCT  No results for input(s): INR, APTT in the last 72 hours. No lab exists for component: PT, INREXT    The H & P and/or progress notes and any available imaging were reviewed. The risks, indications and possible alternatives to the procedure, including doing nothing, were discussed and informed consent was obtained. Physical Exam:      Mental status:   Alert and oriented. Heart:   Regular rate. Lungs:  Normal respiratory effort. The patient is an appropriate candidate to undergo the planned procedure.      Jessica Pond

## 2018-10-04 NOTE — PROCEDURES
RADIOLOGY POST PARACENTESIS NOTE     October 4, 2018       11:09 AM     Preoperative Diagnosis:   Ascites    Postoperative Diagnosis:  Same. :  Lyndsay Álvarez PA-C    Assistant:  None. Type of Anesthesia: 1% plain lidocaine    Procedure/Description:  US-Guided paracentesis    Findings:  Using ultrasound guidance the largest pocket of peritoneal fluid was localized and marked at the left lower quadrant. The patient was prepped and draped in the usual fashion. 1% Lidocaine was infiltrated locally. A 5 Italian over the needle catheter was advanced into the peritoneal cavity and clear yellow colored fluid was aspirated. Once fluid was easily aspirated, the needle was removed leaving the catheter in place. The catheter was connected to vacuum containers and 3 liters of ascitic fluid was removed.     Estimated blood Loss:  Minimal    Specimen Removed:   No    Complications: None    Condition: Stable    Discharge Plan:  discharge home     Jessica Jay

## 2018-10-25 ENCOUNTER — HOSPITAL ENCOUNTER (OUTPATIENT)
Dept: LAB | Age: 48
Discharge: HOME | End: 2018-10-25
Payer: MEDICARE

## 2018-10-25 ENCOUNTER — HOSPITAL ENCOUNTER (OUTPATIENT)
Dept: ULTRASOUND IMAGING | Age: 48
Discharge: HOME | End: 2018-10-25
Attending: INTERNAL MEDICINE
Payer: MEDICARE

## 2018-10-25 DIAGNOSIS — R18.8 OTHER ASCITES: ICD-10-CM

## 2018-10-25 DIAGNOSIS — R18.8 ASCITIC FLUID: ICD-10-CM

## 2018-10-25 LAB
APTT PPP: 37.3 SEC (ref 23–36.4)
ERYTHROCYTE [DISTWIDTH] IN BLOOD BY AUTOMATED COUNT: 17 % (ref 11.6–14.5)
HCT VFR BLD AUTO: 30.6 % (ref 36–48)
HGB BLD-MCNC: 9.6 G/DL (ref 13–16)
INR PPP: 1.3 (ref 0.8–1.2)
MCH RBC QN AUTO: 28.8 PG (ref 24–34)
MCHC RBC AUTO-ENTMCNC: 31.4 G/DL (ref 31–37)
MCV RBC AUTO: 91.9 FL (ref 74–97)
PLATELET # BLD AUTO: 181 K/UL (ref 135–420)
PMV BLD AUTO: 11.7 FL (ref 9.2–11.8)
PROTHROMBIN TIME: 15.7 SEC (ref 11.5–15.2)
RBC # BLD AUTO: 3.33 M/UL (ref 4.7–5.5)
WBC # BLD AUTO: 6.8 K/UL (ref 4.6–13.2)

## 2018-10-25 PROCEDURE — 36415 COLL VENOUS BLD VENIPUNCTURE: CPT | Performed by: INTERNAL MEDICINE

## 2018-10-25 PROCEDURE — 85027 COMPLETE CBC AUTOMATED: CPT | Performed by: INTERNAL MEDICINE

## 2018-10-25 PROCEDURE — 85610 PROTHROMBIN TIME: CPT | Performed by: INTERNAL MEDICINE

## 2018-10-25 PROCEDURE — 85730 THROMBOPLASTIN TIME PARTIAL: CPT | Performed by: INTERNAL MEDICINE

## 2018-10-25 PROCEDURE — C1729 CATH, DRAINAGE: HCPCS

## 2018-10-25 NOTE — PROCEDURES
RADIOLOGY POST PARACENTESIS NOTE     October 25, 2018       2:11 PM     Preoperative Diagnosis:   Ascites    Postoperative Diagnosis:  Same. :  Joaquín Mcneal PA-C    Assistant:  None. Type of Anesthesia: 1% plain lidocaine    Procedure/Description:  US-Guided paracentesis    Findings:  Using ultrasound guidance the largest pocket of peritoneal fluid was localized and marked at the left lower quadrant. The patient was prepped and draped in the usual fashion. 1% Lidocaine was infiltrated locally. A 5 Malay over the needle catheter was advanced into the peritoneal cavity and clear yellow colored fluid was aspirated. Once fluid was easily aspirated, the needle was removed leaving the catheter in place. The catheter was connected to vacuum containers and 2 liters of ascitic fluid was removed.     Estimated blood Loss:  Minimal    Specimen Removed:   No    Complications: None    Condition: Stable    Discharge Plan:  discharge home     Jessica Pond

## 2018-11-16 ENCOUNTER — HOSPITAL ENCOUNTER (OUTPATIENT)
Dept: ULTRASOUND IMAGING | Age: 48
Discharge: HOME | End: 2018-11-16
Payer: MEDICARE

## 2018-11-16 ENCOUNTER — HOSPITAL ENCOUNTER (OUTPATIENT)
Dept: LAB | Age: 48
Discharge: HOME | End: 2018-11-16

## 2018-11-16 DIAGNOSIS — R18.8 ASCITES: ICD-10-CM

## 2018-11-16 LAB
ANION GAP SERPL CALC-SCNC: 17 MMOL/L (ref 3–18)
APTT PPP: 38.1 SEC (ref 23–36.4)
BUN SERPL-MCNC: 124 MG/DL (ref 7–18)
BUN/CREAT SERPL: 10 (ref 12–20)
CALCIUM SERPL-MCNC: 8.7 MG/DL (ref 8.5–10.1)
CHLORIDE SERPL-SCNC: 108 MMOL/L (ref 100–108)
CO2 SERPL-SCNC: 19 MMOL/L (ref 21–32)
CREAT SERPL-MCNC: 12.5 MG/DL (ref 0.6–1.3)
ERYTHROCYTE [DISTWIDTH] IN BLOOD BY AUTOMATED COUNT: 16.9 % (ref 11.6–14.5)
GLUCOSE SERPL-MCNC: 84 MG/DL (ref 74–99)
HCT VFR BLD AUTO: 32.2 % (ref 36–48)
HGB BLD-MCNC: 10.2 G/DL (ref 13–16)
INR PPP: 1.3 (ref 0.8–1.2)
MCH RBC QN AUTO: 28 PG (ref 24–34)
MCHC RBC AUTO-ENTMCNC: 31.7 G/DL (ref 31–37)
MCV RBC AUTO: 88.5 FL (ref 74–97)
PLATELET # BLD AUTO: 232 K/UL (ref 135–420)
PMV BLD AUTO: 11.6 FL (ref 9.2–11.8)
POTASSIUM SERPL-SCNC: 6.2 MMOL/L (ref 3.5–5.5)
PROTHROMBIN TIME: 15.6 SEC (ref 11.5–15.2)
RBC # BLD AUTO: 3.64 M/UL (ref 4.7–5.5)
SODIUM SERPL-SCNC: 144 MMOL/L (ref 136–145)
WBC # BLD AUTO: 9.5 K/UL (ref 4.6–13.2)

## 2018-11-16 PROCEDURE — 85027 COMPLETE CBC AUTOMATED: CPT

## 2018-11-16 PROCEDURE — 80048 BASIC METABOLIC PNL TOTAL CA: CPT

## 2018-11-16 PROCEDURE — 74011250636 HC RX REV CODE- 250/636: Performed by: PHYSICIAN ASSISTANT

## 2018-11-16 PROCEDURE — 36415 COLL VENOUS BLD VENIPUNCTURE: CPT

## 2018-11-16 PROCEDURE — 85610 PROTHROMBIN TIME: CPT

## 2018-11-16 PROCEDURE — C1729 CATH, DRAINAGE: HCPCS

## 2018-11-16 PROCEDURE — 85730 THROMBOPLASTIN TIME PARTIAL: CPT

## 2018-11-16 RX ORDER — LIDOCAINE HYDROCHLORIDE 10 MG/ML
30 INJECTION, SOLUTION EPIDURAL; INFILTRATION; INTRACAUDAL; PERINEURAL ONCE
Status: COMPLETED | OUTPATIENT
Start: 2018-11-16 | End: 2018-11-16

## 2018-11-16 RX ADMIN — LIDOCAINE HYDROCHLORIDE 10 ML: 10 INJECTION, SOLUTION EPIDURAL; INFILTRATION; INTRACAUDAL; PERINEURAL at 13:42

## 2018-11-16 NOTE — PROGRESS NOTES
Interventional Radiology      Lab called with a critical potassium result of 6.2. Messages left for patient on his cell phone, home phone and emergency contact cell phone between [phone]. Phone call placed to patient's PCP office, at [phone]. Critical value relayed to [facility] who will be relaying the message to Dr. Amy Francis.        Debra Cote 91.

## 2018-11-16 NOTE — H&P
OUTPATIENT HISTORY AND PHYSICAL      Today 11/16/2018     Indication/Symptoms:   Cj Ventura is a 51 y.o. male with recurrent ascites who presents for an US-guided paracentesis. Current Meds:    Prior to Admission medications    Medication Sig Start Date End Date Taking? Authorizing Provider   cinacalcet (SENSIPAR) 60 mg tab Take  by mouth. Provider, Historical   hydrALAZINE (APRESOLINE) 100 mg tablet Take 1 Tab by mouth three (3) times daily. 3/30/18   Keyonna Badillo MD   allopurinol (ZYLOPRIM) 100 mg tablet TAKE 1 TABLET BY MOUTH TWICE DAILY 3/15/18   Jem Crawford MD   cyclobenzaprine (FLEXERIL) 10 mg tablet Take 0.5 Tabs by mouth two (2) times a day. 5/4/17   Gilma Ahmadi MD   lisinopril (PRINIVIL, ZESTRIL) 40 mg tablet TAKE 1 TABLET BY MOUTH DAILY 4/19/17   Gilma Ahmadi MD   NIFEdipine ER (PROCARDIA XL) 90 mg ER tablet Take 90 mg by mouth daily. Provider, Historical   thiamine (VITAMIN B-1) 100 mg tablet Take  by mouth daily. Provider, Historical   doxazosin (CARDURA) 1 mg tablet TAKE 1 TABLET BY MOUTH EVERY NIGHT 4/10/17   Keyonna Badillo MD   isosorbide mononitrate ER (IMDUR) 120 mg CR tablet TAKE 1 TABLET BY MOUTH EVERY MORNING 4/10/17   Keyonna Badillo MD   sevelamer carbonate (RENVELA) 800 mg tab tab Take  by mouth three (3) times daily. Provider, Historical   AMINO ACIDS/PROTEIN HYDROLYS (PRO-STAT 64 PO) Take  by mouth. Provider, Historical   mupirocin calcium (BACTROBAN NASAL) 2 % nasal ointment by Both Nostrils route two (2) times a day. Indications: as  needed    Provider, Historical   b complex-vitamin c-folic acid (NEPHROCAPS) 1 mg capsule Take 1 Cap by mouth daily.  [Request refills from PCP (Dr. Deedee Priest)] 3/31/17   Gilma Ahmadi MD   carvedilol (COREG) 25 mg tablet TAKE 1 TABLET BY MOUTH TWICE DAILY WITH MEALS 3/31/17   Gilma Ahmadi MD   levETIRAcetam (KEPPRA) 250 mg tablet TAKE 1 TABLET BY MOUTH EVERY Monday, Wednesday, Friday AFTER EACH DIALYSIS 12/8/16   Chase Causey MD   levETIRAcetam (KEPPRA) 500 mg tablet TAKE 1 TABLET BY MOUTH DAILY 12/8/16   Chase Causey MD   acetaminophen (TYLENOL) 500 mg tablet Take  by mouth every six (6) hours as needed for Pain. Provider, Historical   simethicone (PHAZYME) 180 mg cap Take  by mouth. Provider, Historical   cloNIDine HCl (CATAPRES) 0.3 mg tablet Take 1 Tab by mouth three (3) times daily. 9/22/16   Joe Bazan MD   oxyCODONE-acetaminophen (PERCOCET) 5-325 mg per tablet Take 1 Tab by mouth every eight (8) hours as needed. Max Daily Amount: 3 Tabs. 9/22/16   Joe Bazan MD   senna-docusate (PERICOLACE) 8.6-50 mg per tablet Take 1 Tab by mouth daily. 3/1/16   Gilma Ahmadi MD   aspirin delayed-release 81 mg tablet Take 1 tablet by mouth daily. 1/22/15   Salvador Campo MD   pantoprazole (PROTONIX) 40 mg tablet Take 1 Tab by mouth Daily (before breakfast). [Request refills from PCP (Dr. Tomasa Priest)] 5/8/13   Asim Epstein MD   cholecalciferol (VITAMIN D3) 1,000 unit tablet Take 2 Tabs by mouth two (2) times a day. [Request refills from PCP (Dr. Tomasa Priest)] 5/8/13   Asim Epstein MD       Allergies: Allergies   Allergen Reactions    Amlodipine Swelling    Nuts [Tree Nut] Swelling    Pcn [Penicillins] Unknown (comments)    Phenothiazines Other (comments)     Per father, \"He has a parkinsonian reaction to this medication. \"       Comorbid Conditions:    Past Medical History:   Diagnosis Date    Anemia in chronic kidney disease(285.21) 4/24/2013    Cardiomyopathy due to hypertension ([address] 4/24/2013    EF 20%    Edema of lower extremity 4/24/2013    End stage renal disease ([address] 04/19/2013    HD since 9/2003    Hyperphosphatemia 4/24/2013    Hypertension     Hypertension, uncontrolled 4/24/2013    Personal history of atrial flutter 4/24/2013    Pulmonary hypertension ([address] 4/24/2013    Recommendation refused by patient 4/24/2013    Patient refuses dialysis (hemodialysis or peritoneal dialysis)    Secondary hyperparathyroidism of renal origin ([facility] [address] 4/24/2013    Stroke ([facility] [address]     x2 1/2015    Vitamin D insufficiency 4/25/2013    Vitamin D 25-Hydroxy 20.7           Past Surgical History:   Procedure Laterality Date    HX CSF SHUNT  09/2016    HX HEART CATHETERIZATION  01/2015     Data:    There were no vitals taken for this visit.:  No results for input(s): PLT, PLTEXT in the last 72 hours. No lab exists for component:  HCT  No results for input(s): INR, APTT in the last 72 hours. No lab exists for component: PT, INREXT    The H & P and/or progress notes and any available imaging were reviewed. The risks, indications and possible alternatives to the procedure, including doing nothing, were discussed and informed consent was obtained. Physical Exam:      Mental status:   Alert and oriented. Heart:   Regular rate. Lungs:  Normal respiratory effort. The patient is an appropriate candidate to undergo the planned procedure.      [place], [place]

## 2018-11-16 NOTE — PROCEDURES
RADIOLOGY POST PARACENTESIS NOTE     November 16, 2018       2:57 PM     Preoperative Diagnosis:   Ascites    Postoperative Diagnosis:  Same. :  Karen Kapadia PA-C    Assistant:  None. Type of Anesthesia: 1% plain lidocaine    Procedure/Description:  US-Guided paracentesis    Findings:  Using ultrasound guidance the largest pocket of peritoneal fluid was localized and marked at the left lower quadrant. The patient was prepped and draped in the usual fashion. 1% Lidocaine was infiltrated locally. A 5 Ghanaian over the needle catheter was advanced into the peritoneal cavity and clear yellow colored fluid was aspirated. Once fluid was easily aspirated, the needle was removed leaving the catheter in place. The catheter was connected to vacuum containers and 1.9 liters of ascitic fluid was removed.     Estimated blood Loss:  Minimal    Specimen Removed:   No    Complications: None    Condition: Stable    Discharge Plan:  discharge home     Jessica Jones Asp

## 2018-12-06 RX ORDER — CLONIDINE HYDROCHLORIDE 0.3 MG/1
0.3 TABLET ORAL 3 TIMES DAILY
Qty: 270 TAB | Refills: 0 | OUTPATIENT
Start: 2018-12-06

## 2018-12-06 RX ORDER — CLONIDINE HYDROCHLORIDE 0.3 MG/1
0.3 TABLET ORAL 3 TIMES DAILY
Qty: 270 TAB | Refills: 0 | Status: SHIPPED | OUTPATIENT
Start: 2018-12-06 | End: 2019-03-18 | Stop reason: SDUPTHER

## 2018-12-12 ENCOUNTER — HOSPITAL ENCOUNTER (OUTPATIENT)
Dept: ULTRASOUND IMAGING | Age: 48
Discharge: HOME | End: 2018-12-12
Attending: INTERNAL MEDICINE
Payer: MEDICARE

## 2018-12-12 DIAGNOSIS — R18.8 ASCITES: ICD-10-CM

## 2018-12-12 PROCEDURE — C1729 CATH, DRAINAGE: HCPCS

## 2019-01-01 ENCOUNTER — HOSPITAL ENCOUNTER (OUTPATIENT)
Dept: ULTRASOUND IMAGING | Age: 49
Discharge: HOME | End: 2019-12-12
Attending: NURSE PRACTITIONER
Payer: MEDICARE

## 2019-01-01 ENCOUNTER — HOSPITAL ENCOUNTER (OUTPATIENT)
Dept: LAB | Age: 49
Discharge: HOME | End: 2019-07-18
Attending: NURSE PRACTITIONER
Payer: MEDICARE

## 2019-01-01 ENCOUNTER — HOSPITAL ENCOUNTER (OUTPATIENT)
Dept: LAB | Age: 49
Discharge: HOME | End: 2019-10-10
Attending: NURSE PRACTITIONER
Payer: MEDICARE

## 2019-01-01 ENCOUNTER — HOSPITAL ENCOUNTER (OUTPATIENT)
Dept: ULTRASOUND IMAGING | Age: 49
Discharge: HOME | End: 2019-08-29
Attending: NURSE PRACTITIONER
Payer: MEDICARE

## 2019-01-01 ENCOUNTER — HOSPITAL ENCOUNTER (OUTPATIENT)
Dept: ULTRASOUND IMAGING | Age: 49
Discharge: HOME | End: 2019-11-25
Attending: NURSE PRACTITIONER
Payer: MEDICARE

## 2019-01-01 ENCOUNTER — HOSPITAL ENCOUNTER (OUTPATIENT)
Age: 49
Setting detail: OUTPATIENT SURGERY
Discharge: HOME | End: 2019-08-01
Attending: INTERNAL MEDICINE | Admitting: INTERNAL MEDICINE
Payer: MEDICARE

## 2019-01-01 ENCOUNTER — HOSPITAL ENCOUNTER (EMERGENCY)
Age: 49
Discharge: HOME | End: 2019-12-13
Attending: EMERGENCY MEDICINE
Payer: MEDICARE

## 2019-01-01 ENCOUNTER — HOSPITAL ENCOUNTER (OUTPATIENT)
Dept: ULTRASOUND IMAGING | Age: 49
Discharge: HOME | End: 2019-07-18
Attending: NURSE PRACTITIONER
Payer: MEDICARE

## 2019-01-01 ENCOUNTER — HOSPITAL ENCOUNTER (OUTPATIENT)
Dept: ULTRASOUND IMAGING | Age: 49
Discharge: HOME | End: 2019-10-10
Attending: NURSE PRACTITIONER
Payer: MEDICARE

## 2019-01-01 ENCOUNTER — HOSPITAL ENCOUNTER (OUTPATIENT)
Dept: ULTRASOUND IMAGING | Age: 49
Discharge: HOME | End: 2019-06-24
Attending: INTERNAL MEDICINE
Payer: MEDICARE

## 2019-01-01 ENCOUNTER — HOSPITAL ENCOUNTER (OUTPATIENT)
Dept: LAB | Age: 49
Discharge: HOME | End: 2019-11-25
Attending: NURSE PRACTITIONER
Payer: MEDICARE

## 2019-01-01 ENCOUNTER — HOSPITAL ENCOUNTER (OUTPATIENT)
Dept: LAB | Age: 49
Discharge: HOME | End: 2019-08-29
Attending: NURSE PRACTITIONER
Payer: MEDICARE

## 2019-01-01 ENCOUNTER — HOSPITAL ENCOUNTER (OUTPATIENT)
Dept: ULTRASOUND IMAGING | Age: 49
Discharge: HOME | End: 2019-12-26
Attending: NURSE PRACTITIONER
Payer: MEDICARE

## 2019-01-01 ENCOUNTER — HOSPITAL ENCOUNTER (OUTPATIENT)
Dept: ULTRASOUND IMAGING | Age: 49
Discharge: HOME | End: 2019-08-08
Attending: NURSE PRACTITIONER
Payer: MEDICARE

## 2019-01-01 ENCOUNTER — HOSPITAL ENCOUNTER (OUTPATIENT)
Dept: ULTRASOUND IMAGING | Age: 49
Discharge: HOME | End: 2019-10-31
Attending: NURSE PRACTITIONER
Payer: MEDICARE

## 2019-01-01 ENCOUNTER — HOSPITAL ENCOUNTER (OUTPATIENT)
Dept: LAB | Age: 49
Discharge: HOME | End: 2019-12-26
Attending: NURSE PRACTITIONER
Payer: MEDICARE

## 2019-01-01 ENCOUNTER — DOCUMENTATION ONLY (OUTPATIENT)
Dept: VASCULAR SURGERY | Age: 49
End: 2019-01-01

## 2019-01-01 ENCOUNTER — HOSPITAL ENCOUNTER (OUTPATIENT)
Dept: ULTRASOUND IMAGING | Age: 49
Discharge: HOME | End: 2019-09-19
Attending: NURSE PRACTITIONER
Payer: MEDICARE

## 2019-01-01 ENCOUNTER — HOSPITAL ENCOUNTER (OUTPATIENT)
Dept: LAB | Age: 49
Discharge: HOME | End: 2019-12-26

## 2019-01-01 ENCOUNTER — ANESTHESIA (OUTPATIENT)
Dept: ENDOSCOPY | Age: 49
End: 2019-01-01
Payer: MEDICARE

## 2019-01-01 ENCOUNTER — ANESTHESIA EVENT (OUTPATIENT)
Dept: ENDOSCOPY | Age: 49
End: 2019-01-01
Payer: MEDICARE

## 2019-01-01 ENCOUNTER — OFFICE VISIT (OUTPATIENT)
Dept: CARDIOLOGY CLINIC | Age: 49
End: 2019-01-01

## 2019-01-01 VITALS
OXYGEN SATURATION: 100 % | BODY MASS INDEX: 24.5 KG/M2 | SYSTOLIC BLOOD PRESSURE: 176 MMHG | RESPIRATION RATE: 20 BRPM | DIASTOLIC BLOOD PRESSURE: 105 MMHG | WEIGHT: 175 LBS | HEIGHT: 71 IN | TEMPERATURE: 97.9 F | HEART RATE: 72 BPM

## 2019-01-01 VITALS
HEIGHT: 71 IN | SYSTOLIC BLOOD PRESSURE: 166 MMHG | HEART RATE: 89 BPM | DIASTOLIC BLOOD PRESSURE: 101 MMHG | WEIGHT: 189 LBS | BODY MASS INDEX: 26.46 KG/M2

## 2019-01-01 VITALS
DIASTOLIC BLOOD PRESSURE: 90 MMHG | TEMPERATURE: 98.3 F | RESPIRATION RATE: 16 BRPM | OXYGEN SATURATION: 98 % | SYSTOLIC BLOOD PRESSURE: 171 MMHG | HEART RATE: 89 BPM

## 2019-01-01 DIAGNOSIS — R18.8 OTHER ASCITES: ICD-10-CM

## 2019-01-01 DIAGNOSIS — I27.20 PULMONARY HYPERTENSION (HCC): ICD-10-CM

## 2019-01-01 DIAGNOSIS — Z00.00 ROUTINE ADULT HEALTH MAINTENANCE: ICD-10-CM

## 2019-01-01 DIAGNOSIS — I10 HYPERTENSION, UNCONTROLLED: Chronic | ICD-10-CM

## 2019-01-01 DIAGNOSIS — I50.32 CHRONIC DIASTOLIC CONGESTIVE HEART FAILURE (HCC): ICD-10-CM

## 2019-01-01 DIAGNOSIS — R18.8 ASCITES: ICD-10-CM

## 2019-01-01 DIAGNOSIS — Z91.14 NON COMPLIANCE W MEDICATION REGIMEN: ICD-10-CM

## 2019-01-01 DIAGNOSIS — N18.6 ESRD (END STAGE RENAL DISEASE) ON DIALYSIS (HCC): ICD-10-CM

## 2019-01-01 DIAGNOSIS — N18.6 END STAGE RENAL DISEASE (HCC): ICD-10-CM

## 2019-01-01 DIAGNOSIS — I10 UNCONTROLLED HYPERTENSION: Primary | ICD-10-CM

## 2019-01-01 DIAGNOSIS — Z99.2 ESRD (END STAGE RENAL DISEASE) ON DIALYSIS (HCC): ICD-10-CM

## 2019-01-01 DIAGNOSIS — I10 UNCONTROLLED HYPERTENSION: ICD-10-CM

## 2019-01-01 DIAGNOSIS — M79.10 MYALGIA: Primary | ICD-10-CM

## 2019-01-01 LAB
ALBUMIN SERPL-MCNC: 2.6 G/DL (ref 3.4–5)
ALBUMIN/GLOB SERPL: 0.6 {RATIO} (ref 0.8–1.7)
ALP SERPL-CCNC: 82 U/L (ref 45–117)
ALT SERPL-CCNC: 17 U/L (ref 16–61)
ANION GAP SERPL CALC-SCNC: 7 MMOL/L (ref 3–18)
ANION GAP SERPL CALC-SCNC: 9 MMOL/L (ref 3–18)
APTT PPP: 32.8 SEC (ref 23–36.4)
APTT PPP: 34.2 SEC (ref 23–36.4)
APTT PPP: 34.6 SEC (ref 23–36.4)
APTT PPP: 34.9 SEC (ref 23–36.4)
APTT PPP: 36 SEC (ref 23–36.4)
AST SERPL-CCNC: 15 U/L (ref 10–38)
BASOPHILS # BLD: 0 K/UL (ref 0–0.1)
BASOPHILS NFR BLD: 0 % (ref 0–2)
BILIRUB SERPL-MCNC: 0.4 MG/DL (ref 0.2–1)
BUN BLD-MCNC: 61 MG/DL (ref 7–18)
BUN SERPL-MCNC: 47 MG/DL (ref 7–18)
BUN SERPL-MCNC: 50 MG/DL (ref 7–18)
BUN SERPL-MCNC: 51 MG/DL (ref 7–18)
BUN SERPL-MCNC: 60 MG/DL (ref 7–18)
BUN/CREAT SERPL: 8 (ref 12–20)
CALCIUM SERPL-MCNC: 7.7 MG/DL (ref 8.5–10.1)
CALCIUM SERPL-MCNC: 7.8 MG/DL (ref 8.5–10.1)
CALCIUM SERPL-MCNC: 8.2 MG/DL (ref 8.5–10.1)
CALCIUM SERPL-MCNC: 9.4 MG/DL (ref 8.5–10.1)
CHLORIDE BLD-SCNC: 105 MMOL/L (ref 100–108)
CHLORIDE SERPL-SCNC: 103 MMOL/L (ref 100–111)
CHLORIDE SERPL-SCNC: 105 MMOL/L (ref 100–111)
CHLORIDE SERPL-SCNC: 107 MMOL/L (ref 100–111)
CHLORIDE SERPL-SCNC: 107 MMOL/L (ref 100–111)
CO2 SERPL-SCNC: 29 MMOL/L (ref 21–32)
CO2 SERPL-SCNC: 30 MMOL/L (ref 21–32)
CO2 SERPL-SCNC: 30 MMOL/L (ref 21–32)
CO2 SERPL-SCNC: 31 MMOL/L (ref 21–32)
CREAT SERPL-MCNC: 6.05 MG/DL (ref 0.6–1.3)
CREAT SERPL-MCNC: 6.41 MG/DL (ref 0.6–1.3)
CREAT SERPL-MCNC: 6.59 MG/DL (ref 0.6–1.3)
CREAT SERPL-MCNC: 7.22 MG/DL (ref 0.6–1.3)
DIFFERENTIAL METHOD BLD: ABNORMAL
EOSINOPHIL # BLD: 0.3 K/UL (ref 0–0.4)
EOSINOPHIL NFR BLD: 3 % (ref 0–5)
ERYTHROCYTE [DISTWIDTH] IN BLOOD BY AUTOMATED COUNT: 18.7 % (ref 11.6–14.5)
ERYTHROCYTE [DISTWIDTH] IN BLOOD BY AUTOMATED COUNT: 18.8 % (ref 11.6–14.5)
ERYTHROCYTE [DISTWIDTH] IN BLOOD BY AUTOMATED COUNT: 18.8 % (ref 11.6–14.5)
ERYTHROCYTE [DISTWIDTH] IN BLOOD BY AUTOMATED COUNT: 19.3 % (ref 11.6–14.5)
ERYTHROCYTE [DISTWIDTH] IN BLOOD BY AUTOMATED COUNT: 20.2 % (ref 11.6–14.5)
ERYTHROCYTE [DISTWIDTH] IN BLOOD BY AUTOMATED COUNT: 20.6 % (ref 11.6–14.5)
GLOBULIN SER CALC-MCNC: 4.2 G/DL (ref 2–4)
GLUCOSE BLD STRIP.AUTO-MCNC: 82 MG/DL (ref 74–106)
GLUCOSE SERPL-MCNC: 102 MG/DL (ref 74–99)
GLUCOSE SERPL-MCNC: 102 MG/DL (ref 74–99)
GLUCOSE SERPL-MCNC: 119 MG/DL (ref 74–99)
GLUCOSE SERPL-MCNC: 122 MG/DL (ref 74–99)
HCT VFR BLD AUTO: 24.3 % (ref 36–48)
HCT VFR BLD AUTO: 28.9 % (ref 36–48)
HCT VFR BLD AUTO: 29.4 % (ref 36–48)
HCT VFR BLD AUTO: 30.6 % (ref 36–48)
HCT VFR BLD AUTO: 34.7 % (ref 36–48)
HCT VFR BLD AUTO: 36.1 % (ref 36–48)
HCT VFR BLD CALC: 25 % (ref 36–49)
HGB BLD-MCNC: 10.6 G/DL (ref 13–16)
HGB BLD-MCNC: 11.4 G/DL (ref 13–16)
HGB BLD-MCNC: 7.4 G/DL (ref 13–16)
HGB BLD-MCNC: 8.5 G/DL (ref 12–16)
HGB BLD-MCNC: 8.8 G/DL (ref 13–16)
HGB BLD-MCNC: 8.9 G/DL (ref 13–16)
HGB BLD-MCNC: 8.9 G/DL (ref 13–16)
INR PPP: 1.3 (ref 0.8–1.2)
INR PPP: 1.4 (ref 0.8–1.2)
LYMPHOCYTES # BLD: 0.7 K/UL (ref 0.9–3.6)
LYMPHOCYTES NFR BLD: 7 % (ref 21–52)
MCH RBC QN AUTO: 26.3 PG (ref 24–34)
MCH RBC QN AUTO: 26.7 PG (ref 24–34)
MCH RBC QN AUTO: 26.7 PG (ref 24–34)
MCH RBC QN AUTO: 27.2 PG (ref 24–34)
MCH RBC QN AUTO: 27.6 PG (ref 24–34)
MCH RBC QN AUTO: 28 PG (ref 24–34)
MCHC RBC AUTO-ENTMCNC: 29.1 G/DL (ref 31–37)
MCHC RBC AUTO-ENTMCNC: 29.9 G/DL (ref 31–37)
MCHC RBC AUTO-ENTMCNC: 30.5 G/DL (ref 31–37)
MCHC RBC AUTO-ENTMCNC: 30.5 G/DL (ref 31–37)
MCHC RBC AUTO-ENTMCNC: 30.8 G/DL (ref 31–37)
MCHC RBC AUTO-ENTMCNC: 31.6 G/DL (ref 31–37)
MCV RBC AUTO: 87.4 FL (ref 74–97)
MCV RBC AUTO: 87.4 FL (ref 74–97)
MCV RBC AUTO: 87.7 FL (ref 74–97)
MCV RBC AUTO: 90.5 FL (ref 74–97)
MCV RBC AUTO: 90.9 FL (ref 74–97)
MCV RBC AUTO: 91 FL (ref 74–97)
MONOCYTES # BLD: 0.6 K/UL (ref 0.05–1.2)
MONOCYTES NFR BLD: 6 % (ref 3–10)
NEUTS SEG # BLD: 8.5 K/UL (ref 1.8–8)
NEUTS SEG NFR BLD: 84 % (ref 40–73)
PLATELET # BLD AUTO: 193 K/UL (ref 135–420)
PLATELET # BLD AUTO: 195 K/UL (ref 135–420)
PLATELET # BLD AUTO: 198 K/UL (ref 135–420)
PLATELET # BLD AUTO: 222 K/UL (ref 135–420)
PLATELET # BLD AUTO: 236 K/UL (ref 135–420)
PLATELET # BLD AUTO: 255 K/UL (ref 135–420)
PLATELET COMMENTS,PCOM: ABNORMAL
PMV BLD AUTO: 10.4 FL (ref 9.2–11.8)
PMV BLD AUTO: 10.5 FL (ref 9.2–11.8)
PMV BLD AUTO: 12 FL (ref 9.2–11.8)
POTASSIUM BLD-SCNC: 5.1 MMOL/L (ref 3.5–5.5)
POTASSIUM SERPL-SCNC: 4.2 MMOL/L (ref 3.5–5.5)
PROT SERPL-MCNC: 6.8 G/DL (ref 6.4–8.2)
PROTHROMBIN TIME: 15.7 SEC (ref 11.5–15.2)
PROTHROMBIN TIME: 16 SEC (ref 11.5–15.2)
PROTHROMBIN TIME: 16.3 SEC (ref 11.5–15.2)
PROTHROMBIN TIME: 16.4 SEC (ref 11.5–15.2)
PROTHROMBIN TIME: 16.5 SEC (ref 11.5–15.2)
RBC # BLD AUTO: 2.77 M/UL (ref 4.7–5.5)
RBC # BLD AUTO: 3.18 M/UL (ref 4.7–5.5)
RBC # BLD AUTO: 3.23 M/UL (ref 4.7–5.5)
RBC # BLD AUTO: 3.38 M/UL (ref 4.7–5.5)
RBC # BLD AUTO: 3.97 M/UL (ref 4.7–5.5)
RBC # BLD AUTO: 4.13 M/UL (ref 4.7–5.5)
RBC MORPH BLD: ABNORMAL
SODIUM BLD-SCNC: 142 MMOL/L (ref 136–145)
SODIUM SERPL-SCNC: 142 MMOL/L (ref 136–145)
SODIUM SERPL-SCNC: 143 MMOL/L (ref 136–145)
SODIUM SERPL-SCNC: 143 MMOL/L (ref 136–145)
SODIUM SERPL-SCNC: 144 MMOL/L (ref 136–145)
WBC # BLD AUTO: 10.1 K/UL (ref 4.6–13.2)
WBC # BLD AUTO: 5.5 K/UL (ref 4.6–13.2)
WBC # BLD AUTO: 6.5 K/UL (ref 4.6–13.2)
WBC # BLD AUTO: 7.4 K/UL (ref 4.6–13.2)
WBC # BLD AUTO: 8.2 K/UL (ref 4.6–13.2)
WBC # BLD AUTO: 9.4 K/UL (ref 4.6–13.2)

## 2019-01-01 PROCEDURE — 49083 ABD PARACENTESIS W/IMAGING: CPT

## 2019-01-01 PROCEDURE — 85730 THROMBOPLASTIN TIME PARTIAL: CPT

## 2019-01-01 PROCEDURE — 80048 BASIC METABOLIC PNL TOTAL CA: CPT

## 2019-01-01 PROCEDURE — 84295 ASSAY OF SERUM SODIUM: CPT

## 2019-01-01 PROCEDURE — 74011250636 HC RX REV CODE- 250/636: Performed by: EMERGENCY MEDICINE

## 2019-01-01 PROCEDURE — 96375 TX/PRO/DX INJ NEW DRUG ADDON: CPT

## 2019-01-01 PROCEDURE — 76705 ECHO EXAM OF ABDOMEN: CPT

## 2019-01-01 PROCEDURE — 85610 PROTHROMBIN TIME: CPT

## 2019-01-01 PROCEDURE — 80053 COMPREHEN METABOLIC PANEL: CPT

## 2019-01-01 PROCEDURE — 36415 COLL VENOUS BLD VENIPUNCTURE: CPT

## 2019-01-01 PROCEDURE — 74011250636 HC RX REV CODE- 250/636: Performed by: ANESTHESIOLOGY

## 2019-01-01 PROCEDURE — 85027 COMPLETE CBC AUTOMATED: CPT

## 2019-01-01 PROCEDURE — 74011250636 HC RX REV CODE- 250/636: Performed by: NURSE ANESTHETIST, CERTIFIED REGISTERED

## 2019-01-01 PROCEDURE — 96374 THER/PROPH/DIAG INJ IV PUSH: CPT

## 2019-01-01 PROCEDURE — 74011000258 HC RX REV CODE- 258: Performed by: NURSE ANESTHETIST, CERTIFIED REGISTERED

## 2019-01-01 PROCEDURE — 99283 EMERGENCY DEPT VISIT LOW MDM: CPT

## 2019-01-01 PROCEDURE — 74011250637 HC RX REV CODE- 250/637: Performed by: EMERGENCY MEDICINE

## 2019-01-01 PROCEDURE — 85025 COMPLETE CBC W/AUTO DIFF WBC: CPT

## 2019-01-01 PROCEDURE — 74011000250 HC RX REV CODE- 250: Performed by: NURSE ANESTHETIST, CERTIFIED REGISTERED

## 2019-01-01 RX ORDER — ISOSORBIDE MONONITRATE 120 MG/1
120 TABLET, EXTENDED RELEASE ORAL
Qty: 90 TAB | Refills: 1 | Status: SHIPPED | OUTPATIENT
Start: 2019-01-01 | End: 2020-01-01

## 2019-01-01 RX ORDER — FUROSEMIDE 80 MG/1
40 TABLET ORAL DAILY
COMMUNITY

## 2019-01-01 RX ORDER — SODIUM CHLORIDE 9 MG/ML
20 INJECTION, SOLUTION INTRAVENOUS CONTINUOUS
Status: DISCONTINUED | OUTPATIENT
Start: 2019-01-01 | End: 2019-01-01 | Stop reason: HOSPADM

## 2019-01-01 RX ORDER — MIDAZOLAM HYDROCHLORIDE 1 MG/ML
1 INJECTION, SOLUTION INTRAMUSCULAR; INTRAVENOUS
Status: DISCONTINUED | OUTPATIENT
Start: 2019-01-01 | End: 2019-01-01

## 2019-01-01 RX ORDER — DEXTROSE MONOHYDRATE AND SODIUM CHLORIDE 5; .225 G/100ML; G/100ML
25 INJECTION, SOLUTION INTRAVENOUS CONTINUOUS
Status: DISCONTINUED | OUTPATIENT
Start: 2019-01-01 | End: 2019-01-01 | Stop reason: HOSPADM

## 2019-01-01 RX ORDER — HYDRALAZINE HYDROCHLORIDE 100 MG/1
TABLET, FILM COATED ORAL
Qty: 270 TAB | Refills: 0 | Status: SHIPPED | OUTPATIENT
Start: 2019-01-01

## 2019-01-01 RX ORDER — CARVEDILOL 25 MG/1
25 TABLET ORAL 2 TIMES DAILY WITH MEALS
Qty: 180 TAB | Refills: 3 | Status: SHIPPED | OUTPATIENT
Start: 2019-01-01 | End: 2019-01-01 | Stop reason: SDUPTHER

## 2019-01-01 RX ORDER — LABETALOL HCL 20 MG/4 ML
10 SYRINGE (ML) INTRAVENOUS AS NEEDED
Status: DISCONTINUED | OUTPATIENT
Start: 2019-01-01 | End: 2019-01-01 | Stop reason: HOSPADM

## 2019-01-01 RX ORDER — CARVEDILOL 25 MG/1
25 TABLET ORAL 2 TIMES DAILY WITH MEALS
Qty: 180 TAB | Refills: 3 | Status: SHIPPED | OUTPATIENT
Start: 2019-01-01

## 2019-01-01 RX ORDER — HYDRALAZINE HYDROCHLORIDE 20 MG/ML
10 INJECTION INTRAMUSCULAR; INTRAVENOUS ONCE
Status: COMPLETED | OUTPATIENT
Start: 2019-01-01 | End: 2019-01-01

## 2019-01-01 RX ORDER — ACETAMINOPHEN 500 MG
1000 TABLET ORAL ONCE
Status: COMPLETED | OUTPATIENT
Start: 2019-01-01 | End: 2019-01-01

## 2019-01-01 RX ORDER — SODIUM CHLORIDE, SODIUM LACTATE, POTASSIUM CHLORIDE, CALCIUM CHLORIDE 600; 310; 30; 20 MG/100ML; MG/100ML; MG/100ML; MG/100ML
75 INJECTION, SOLUTION INTRAVENOUS CONTINUOUS
Status: DISCONTINUED | OUTPATIENT
Start: 2019-01-01 | End: 2019-01-01 | Stop reason: HOSPADM

## 2019-01-01 RX ADMIN — SODIUM CHLORIDE 250 ML: 9 INJECTION, SOLUTION INTRAVENOUS at 20:31

## 2019-01-01 RX ADMIN — LABETALOL 20 MG/4 ML (5 MG/ML) INTRAVENOUS SYRINGE 10 MG: at 12:13

## 2019-01-01 RX ADMIN — DEXTROSE MONOHYDRATE AND SODIUM CHLORIDE 25 ML/HR: 5; .225 INJECTION, SOLUTION INTRAVENOUS at 10:44

## 2019-01-01 RX ADMIN — FAMOTIDINE 20 MG: 10 INJECTION, SOLUTION INTRAVENOUS at 11:08

## 2019-01-01 RX ADMIN — HYDRALAZINE HYDROCHLORIDE 10 MG: 20 INJECTION INTRAMUSCULAR; INTRAVENOUS at 11:13

## 2019-01-01 RX ADMIN — ACETAMINOPHEN 1000 MG: 500 TABLET ORAL at 20:48

## 2019-01-03 ENCOUNTER — HOSPITAL ENCOUNTER (OUTPATIENT)
Dept: ULTRASOUND IMAGING | Age: 49
Discharge: HOME | End: 2019-01-03
Attending: INTERNAL MEDICINE
Payer: MEDICARE

## 2019-01-03 ENCOUNTER — HOSPITAL ENCOUNTER (OUTPATIENT)
Dept: INFUSION THERAPY | Age: 49
End: 2019-01-03

## 2019-01-03 ENCOUNTER — HOSPITAL ENCOUNTER (OUTPATIENT)
Dept: LAB | Age: 49
Discharge: HOME | End: 2019-01-03
Attending: INTERNAL MEDICINE
Payer: MEDICARE

## 2019-01-03 DIAGNOSIS — R18.8 OTHER ASCITES: ICD-10-CM

## 2019-01-03 LAB
ANION GAP SERPL CALC-SCNC: 12 MMOL/L (ref 3–18)
APTT PPP: 37.9 SEC (ref 23–36.4)
BUN SERPL-MCNC: 63 MG/DL (ref 7–18)
BUN/CREAT SERPL: 8 (ref 12–20)
CALCIUM SERPL-MCNC: 8.5 MG/DL (ref 8.5–10.1)
CHLORIDE SERPL-SCNC: 106 MMOL/L (ref 100–108)
CO2 SERPL-SCNC: 28 MMOL/L (ref 21–32)
CREAT SERPL-MCNC: 8 MG/DL (ref 0.6–1.3)
ERYTHROCYTE [DISTWIDTH] IN BLOOD BY AUTOMATED COUNT: 16.8 % (ref 11.6–14.5)
GLUCOSE SERPL-MCNC: 97 MG/DL (ref 74–99)
HCT VFR BLD AUTO: 25.6 % (ref 36–48)
HGB BLD-MCNC: 8 G/DL (ref 13–16)
INR PPP: 1.4 (ref 0.8–1.2)
MCH RBC QN AUTO: 29 PG (ref 24–34)
MCHC RBC AUTO-ENTMCNC: 31.3 G/DL (ref 31–37)
MCV RBC AUTO: 92.8 FL (ref 74–97)
PLATELET # BLD AUTO: 167 K/UL (ref 135–420)
PMV BLD AUTO: 11.4 FL (ref 9.2–11.8)
POTASSIUM SERPL-SCNC: 4.6 MMOL/L (ref 3.5–5.5)
PROTHROMBIN TIME: 16.5 SEC (ref 11.5–15.2)
RBC # BLD AUTO: 2.76 M/UL (ref 4.7–5.5)
SODIUM SERPL-SCNC: 146 MMOL/L (ref 136–145)
WBC # BLD AUTO: 6 K/UL (ref 4.6–13.2)

## 2019-01-03 PROCEDURE — 85610 PROTHROMBIN TIME: CPT

## 2019-01-03 PROCEDURE — 49083 ABD PARACENTESIS W/IMAGING: CPT

## 2019-01-03 PROCEDURE — 85730 THROMBOPLASTIN TIME PARTIAL: CPT

## 2019-01-03 PROCEDURE — 85027 COMPLETE CBC AUTOMATED: CPT

## 2019-01-03 PROCEDURE — 36415 COLL VENOUS BLD VENIPUNCTURE: CPT

## 2019-01-03 PROCEDURE — 80048 BASIC METABOLIC PNL TOTAL CA: CPT

## 2019-01-03 NOTE — PROCEDURES
RADIOLOGY POST PARACENTESIS NOTE     January 3, 2019       11:10 AM     Preoperative Diagnosis:   Ascites    Postoperative Diagnosis:  Same. :  Cherry Kapadia PA-C    Assistant:  None. Type of Anesthesia: 1% plain lidocaine    Procedure/Description:  US-Guided paracentesis    Findings:  Using ultrasound guidance the largest pocket of peritoneal fluid was localized and marked at the left lower quadrant. The patient was prepped and draped in the usual fashion. 1% Lidocaine was infiltrated locally. A 5 Turkmen over the needle catheter was advanced into the peritoneal cavity and clear yellow colored fluid was aspirated. Once fluid was easily aspirated, the needle was removed leaving the catheter in place. The catheter was connected to vacuum containers and 1.3 liters of ascitic fluid was removed.     Estimated blood Loss:  Minimal    Specimen Removed:   No    Complications: None    Condition: Stable    Discharge Plan:  discharge home     Jessica Carey Asp

## 2019-01-03 NOTE — H&P
OUTPATIENT HISTORY AND PHYSICAL      Today 1/3/2019     Indication/Symptoms:   Ajay Wade is a 51 y.o. male with recurrent ascites who presents for an US-guided paracentesis. Current Meds:    Prior to Admission medications    Medication Sig Start Date End Date Taking? Authorizing Provider   cloNIDine HCl (CATAPRES) 0.3 mg tablet Take 1 Tab by mouth three (3) times daily. 12/6/18   Sang Faith MD   cinacalcet (SENSIPAR) 60 mg tab Take  by mouth. Provider, Historical   hydrALAZINE (APRESOLINE) 100 mg tablet Take 1 Tab by mouth three (3) times daily. 3/30/18   Sang Faith MD   allopurinol (ZYLOPRIM) 100 mg tablet TAKE 1 TABLET BY MOUTH TWICE DAILY 3/15/18   Billy Martino MD   cyclobenzaprine (FLEXERIL) 10 mg tablet Take 0.5 Tabs by mouth two (2) times a day. 5/4/17   Neftali Leyva MD   lisinopril (PRINIVIL, ZESTRIL) 40 mg tablet TAKE 1 TABLET BY MOUTH DAILY 4/19/17   Neftali Leyva MD   NIFEdipine ER (PROCARDIA XL) 90 mg ER tablet Take 90 mg by mouth daily. Provider, Historical   thiamine (VITAMIN B-1) 100 mg tablet Take  by mouth daily. Provider, Historical   doxazosin (CARDURA) 1 mg tablet TAKE 1 TABLET BY MOUTH EVERY NIGHT 4/10/17   Sang Faith MD   isosorbide mononitrate ER (IMDUR) 120 mg CR tablet TAKE 1 TABLET BY MOUTH EVERY MORNING 4/10/17   Sang Faith MD   sevelamer carbonate (RENVELA) 800 mg tab tab Take  by mouth three (3) times daily. Provider, Historical   AMINO ACIDS/PROTEIN HYDROLYS (PRO-STAT 64 PO) Take  by mouth. Provider, Historical   mupirocin calcium (BACTROBAN NASAL) 2 % nasal ointment by Both Nostrils route two (2) times a day. Indications: as  needed    Provider, Historical   b complex-vitamin c-folic acid (NEPHROCAPS) 1 mg capsule Take 1 Cap by mouth daily.  [Request refills from PCP (Dr. Dede Priest)] 3/31/17   Neftali Leyva MD   carvedilol (COREG) 25 mg tablet TAKE 1 TABLET BY MOUTH TWICE DAILY WITH MEALS 3/31/17 Gabe Ma MD   levETIRAcetam (KEPPRA) 250 mg tablet TAKE 1 TABLET BY MOUTH EVERY Monday, Wednesday, Friday AFTER [facility] DIALYSIS 12/8/16   Michelle Dunham MD   levETIRAcetam (KEPPRA) 500 mg tablet TAKE 1 TABLET BY MOUTH DAILY 12/8/16   Michelle Dunham MD   acetaminophen (TYLENOL) 500 mg tablet Take  by mouth every six (6) hours as needed for Pain. Provider, Historical   simethicone (PHAZYME) 180 mg cap Take  by mouth. Provider, Historical   oxyCODONE-acetaminophen (PERCOCET) 5-325 mg per tablet Take 1 Tab by mouth every eight (8) hours as needed. Max Daily Amount: 3 Tabs. 9/22/16   Arturo Sharma MD   senna-docusate (PERICOLACE) 8.6-50 mg per tablet Take 1 Tab by mouth daily. 3/1/16   Gabe Ma MD   aspirin delayed-release 81 mg tablet Take 1 tablet by mouth daily. 1/22/15   Cecy Macias MD   pantoprazole (PROTONIX) 40 mg tablet Take 1 Tab by mouth Daily (before breakfast). [Request refills from PCP (Dr. Sarah Priest)] 5/8/13   Raciel Dempsey MD   cholecalciferol (VITAMIN D3) 1,000 unit tablet Take 2 Tabs by mouth two (2) times a day. [Request refills from PCP (Dr. Sarah Priest)] 5/8/13   Raciel Dempsey MD       Allergies: Allergies   Allergen Reactions    Amlodipine Swelling    Nuts [Tree Nut] Swelling    Pcn [Penicillins] Unknown (comments)    Phenothiazines Other (comments)     Per father, \"He has a parkinsonian reaction to this medication. \"       Comorbid Conditions:    Past Medical History:   Diagnosis Date    Anemia in chronic kidney disease(285.21) 4/24/2013    Cardiomyopathy due to hypertension ([facility] [address] 4/24/2013    EF 20%    Edema of lower extremity 4/24/2013    End stage renal disease ([facility] [address] 04/19/2013    HD since 9/2003    Hyperphosphatemia 4/24/2013    Hypertension     Hypertension, uncontrolled 4/24/2013    Personal history of atrial flutter 4/24/2013    Pulmonary hypertension ([facility] [address] 4/24/2013    Recommendation refused by patient 4/24/2013    Patient refuses dialysis (hemodialysis or peritoneal dialysis)    Secondary hyperparathyroidism of renal origin ([facility] [address] 4/24/2013    Stroke ([facility] [address]     x2 1/2015    Vitamin D insufficiency 4/25/2013    Vitamin D 25-Hydroxy 20.7           Past Surgical History:   Procedure Laterality Date    HX CSF SHUNT  09/2016    HX HEART CATHETERIZATION  01/2015     Data:    There were no vitals taken for this visit.:  Recent Labs     01/03/19  0930        Recent Labs     01/03/19  0930   INR 1.4*   APTT 37.9*       The H & P and/or progress notes and any available imaging were reviewed. The risks, indications and possible alternatives to the procedure, including doing nothing, were discussed and informed consent was obtained. Physical Exam:      Mental status:   Alert and oriented. Heart:   Regular rate. Lungs:  Normal respiratory effort. The patient is an appropriate candidate to undergo the planned procedure.      Noemy Soto, [address]

## 2019-01-14 DIAGNOSIS — I10 UNCONTROLLED HYPERTENSION: ICD-10-CM

## 2019-01-14 RX ORDER — ISOSORBIDE MONONITRATE 120 MG/1
TABLET, EXTENDED RELEASE ORAL
Qty: 90 TAB | Refills: 1 | Status: SHIPPED | OUTPATIENT
Start: 2019-01-14 | End: 2019-01-01 | Stop reason: SDUPTHER

## 2019-01-24 ENCOUNTER — HOSPITAL ENCOUNTER (OUTPATIENT)
Dept: INFUSION THERAPY | Age: 49
End: 2019-01-24

## 2019-01-24 ENCOUNTER — HOSPITAL ENCOUNTER (OUTPATIENT)
Dept: ULTRASOUND IMAGING | Age: 49
Discharge: HOME | End: 2019-01-24
Attending: INTERNAL MEDICINE
Payer: MEDICARE

## 2019-01-24 DIAGNOSIS — R18.8 OTHER ASCITES: ICD-10-CM

## 2019-01-24 PROCEDURE — 49083 ABD PARACENTESIS W/IMAGING: CPT

## 2019-01-24 RX ORDER — SODIUM CHLORIDE 0.9 % (FLUSH) 0.9 %
10-40 SYRINGE (ML) INJECTION AS NEEDED
Status: CANCELLED | OUTPATIENT
Start: 2019-01-24

## 2019-01-24 NOTE — PROCEDURES
RADIOLOGY POST PARACENTESIS NOTE     January 24, 2019       3:27 PM     Preoperative Diagnosis:   Ascites    Postoperative Diagnosis:  Same. :  Vikram Tracy PA-C    Assistant:  None. Type of Anesthesia: 1% plain lidocaine    Procedure/Description:  US-Guided paracentesis    Findings:  Using ultrasound guidance the largest pocket of peritoneal fluid was localized and marked at the left lower quadrant. The patient was prepped and draped in the usual fashion. 1% Lidocaine was infiltrated locally. A 5 Bengali over the needle catheter was advanced into the peritoneal cavity and clear yellow colored fluid was aspirated. Once fluid was easily aspirated, the needle was removed leaving the catheter in place. The catheter was connected to vacuum containers and 1.5 liters of ascitic fluid was removed.     Estimated blood Loss:  Minimal    Specimen Removed:   None    Complications: None    Condition: Stable    Discharge Plan:  discharge home     Jessica Pastrana

## 2019-01-24 NOTE — H&P
OUTPATIENT HISTORY AND PHYSICAL      Today 1/24/2019     Indication/Symptoms:   Deirdre Madrid is a 51 y.o. male with recurrent ascites who presents for an US-guided paracentesis. Current Meds:    Prior to Admission medications    Medication Sig Start Date End Date Taking? Authorizing Provider   isosorbide mononitrate ER (IMDUR) 120 mg CR tablet 1 tab by mouth daily 1/14/19   Adalberto Cuenca NP   cloNIDine HCl (CATAPRES) 0.3 mg tablet Take 1 Tab by mouth three (3) times daily. 12/6/18   Denisse Black MD   cinacalcet (SENSIPAR) 60 mg tab Take  by mouth. Provider, Historical   hydrALAZINE (APRESOLINE) 100 mg tablet Take 1 Tab by mouth three (3) times daily. 3/30/18   Denisse Black MD   allopurinol (ZYLOPRIM) 100 mg tablet TAKE 1 TABLET BY MOUTH TWICE DAILY 3/15/18   Marlen Escobar MD   cyclobenzaprine (FLEXERIL) 10 mg tablet Take 0.5 Tabs by mouth two (2) times a day. 5/4/17   Jonh Alvarado MD   lisinopril (PRINIVIL, ZESTRIL) 40 mg tablet TAKE 1 TABLET BY MOUTH DAILY 4/19/17   Jonh Alvarado MD   NIFEdipine ER (PROCARDIA XL) 90 mg ER tablet Take 90 mg by mouth daily. Provider, Historical   thiamine (VITAMIN B-1) 100 mg tablet Take  by mouth daily. Provider, Historical   doxazosin (CARDURA) 1 mg tablet TAKE 1 TABLET BY MOUTH EVERY NIGHT 4/10/17   Denisse Black MD   sevelamer carbonate (RENVELA) 800 mg tab tab Take  by mouth three (3) times daily. Provider, Historical   AMINO ACIDS/PROTEIN HYDROLYS (PRO-STAT 64 PO) Take  by mouth. Provider, Historical   mupirocin calcium (BACTROBAN NASAL) 2 % nasal ointment by Both Nostrils route two (2) times a day. Indications: as  needed    Provider, Historical   b complex-vitamin c-folic acid (NEPHROCAPS) 1 mg capsule Take 1 Cap by mouth daily.  [Request refills from PCP (Dr. Sonido Priest)] 3/31/17   Jonh Alvarado MD   carvedilol (COREG) 25 mg tablet TAKE 1 TABLET BY MOUTH TWICE DAILY WITH MEALS 3/31/17   Jonh Alvarado MD   levETIRAcetam (KEPPRA) 250 mg tablet TAKE 1 TABLET BY MOUTH EVERY Monday, Wednesday, Friday AFTER [facility] DIALYSIS 12/8/16   Jesusita Rebollar MD   levETIRAcetam (KEPPRA) 500 mg tablet TAKE 1 TABLET BY MOUTH DAILY 12/8/16   Jesusita Rebollar MD   acetaminophen (TYLENOL) 500 mg tablet Take  by mouth every six (6) hours as needed for Pain. Provider, Historical   simethicone (PHAZYME) 180 mg cap Take  by mouth. Provider, Historical   oxyCODONE-acetaminophen (PERCOCET) 5-325 mg per tablet Take 1 Tab by mouth every eight (8) hours as needed. Max Daily Amount: 3 Tabs. 9/22/16   Campbell Lam MD   senna-docusate (PERICOLACE) 8.6-50 mg per tablet Take 1 Tab by mouth daily. 3/1/16   Doris Goel MD   aspirin delayed-release 81 mg tablet Take 1 tablet by mouth daily. 1/22/15   Alannah Collado MD   pantoprazole (PROTONIX) 40 mg tablet Take 1 Tab by mouth Daily (before breakfast). [Request refills from PCP (Dr. Herve Priest)] 5/8/13   Wayne Sullivan MD   cholecalciferol (VITAMIN D3) 1,000 unit tablet Take 2 Tabs by mouth two (2) times a day. [Request refills from PCP (Dr. Herve Priest)] 5/8/13   Wayne Sullivan MD       Allergies: Allergies   Allergen Reactions    Amlodipine Swelling    Nuts [Tree Nut] Swelling    Pcn [Penicillins] Unknown (comments)    Phenothiazines Other (comments)     Per father, \"He has a parkinsonian reaction to this medication. \"       Comorbid Conditions:    Past Medical History:   Diagnosis Date    Anemia in chronic kidney disease(285.21) 4/24/2013    Cardiomyopathy due to hypertension ([facility] [address] 4/24/2013    EF 20%    Edema of lower extremity 4/24/2013    End stage renal disease ([facility] [address] 04/19/2013    HD since 9/2003    Hyperphosphatemia 4/24/2013    Hypertension     Hypertension, uncontrolled 4/24/2013    Personal history of atrial flutter 4/24/2013    Pulmonary hypertension ([facility] [address] 4/24/2013    Recommendation refused by patient 4/24/2013    Patient refuses dialysis (hemodialysis or peritoneal dialysis)    Secondary hyperparathyroidism of renal origin ([facility] [address] 4/24/2013    Stroke ([facility] [address]     x2 1/2015    Vitamin D insufficiency 4/25/2013    Vitamin D 25-Hydroxy 20.7           Past Surgical History:   Procedure Laterality Date    HX CSF SHUNT  09/2016    HX HEART CATHETERIZATION  01/2015     Data:    There were no vitals taken for this visit.:  No results for input(s): PLT, PLTEXT in the last 72 hours. No lab exists for component:  HCT  No results for input(s): INR, APTT in the last 72 hours. No lab exists for component: PT, INREXT    The H & P and/or progress notes and any available imaging were reviewed. The risks, indications and possible alternatives to the procedure, including doing nothing, were discussed and informed consent was obtained. Physical Exam:      Mental status:   Alert and oriented. Heart:   Regular rate. Lungs:  Normal respiratory effort. The patient is an appropriate candidate to undergo the planned procedure.      Jessica Del Rio

## 2019-02-14 ENCOUNTER — OFFICE VISIT (OUTPATIENT)
Dept: CARDIOLOGY CLINIC | Age: 49
End: 2019-02-14

## 2019-02-14 ENCOUNTER — HOSPITAL ENCOUNTER (OUTPATIENT)
Dept: PREADMISSION TESTING | Age: 49
Discharge: HOME | End: 2019-02-14
Attending: INTERNAL MEDICINE
Payer: MEDICARE

## 2019-02-14 ENCOUNTER — HOSPITAL ENCOUNTER (OUTPATIENT)
Dept: ULTRASOUND IMAGING | Age: 49
Discharge: HOME | End: 2019-02-14
Attending: INTERNAL MEDICINE
Payer: MEDICARE

## 2019-02-14 ENCOUNTER — HOSPITAL ENCOUNTER (OUTPATIENT)
Dept: INFUSION THERAPY | Age: 49
Discharge: HOME | End: 2019-02-14

## 2019-02-14 VITALS
BODY MASS INDEX: 24.36 KG/M2 | HEIGHT: 71 IN | HEART RATE: 76 BPM | WEIGHT: 174 LBS | SYSTOLIC BLOOD PRESSURE: 187 MMHG | DIASTOLIC BLOOD PRESSURE: 106 MMHG

## 2019-02-14 DIAGNOSIS — I27.20 PULMONARY HYPERTENSION (HCC): ICD-10-CM

## 2019-02-14 DIAGNOSIS — I10 UNCONTROLLED HYPERTENSION: Primary | ICD-10-CM

## 2019-02-14 DIAGNOSIS — Z91.14 NON COMPLIANCE W MEDICATION REGIMEN: ICD-10-CM

## 2019-02-14 DIAGNOSIS — N18.6 END STAGE RENAL DISEASE (HCC): ICD-10-CM

## 2019-02-14 DIAGNOSIS — I50.32 CHRONIC DIASTOLIC CONGESTIVE HEART FAILURE (HCC): ICD-10-CM

## 2019-02-14 DIAGNOSIS — R18.8 OTHER ASCITES: ICD-10-CM

## 2019-02-14 LAB
ANION GAP SERPL CALC-SCNC: 8 MMOL/L (ref 3–18)
APTT PPP: 37.1 SEC (ref 23–36.4)
BUN SERPL-MCNC: 65 MG/DL (ref 7–18)
BUN/CREAT SERPL: 8 (ref 12–20)
CALCIUM SERPL-MCNC: 7.9 MG/DL (ref 8.5–10.1)
CHLORIDE SERPL-SCNC: 101 MMOL/L (ref 100–108)
CO2 SERPL-SCNC: 30 MMOL/L (ref 21–32)
CREAT SERPL-MCNC: 7.65 MG/DL (ref 0.6–1.3)
ERYTHROCYTE [DISTWIDTH] IN BLOOD BY AUTOMATED COUNT: 18.4 % (ref 11.6–14.5)
GLUCOSE SERPL-MCNC: 94 MG/DL (ref 74–99)
HCT VFR BLD AUTO: 29.2 % (ref 36–48)
HGB BLD-MCNC: 9.1 G/DL (ref 13–16)
INR PPP: 1.2 (ref 0.8–1.2)
MCH RBC QN AUTO: 28.4 PG (ref 24–34)
MCHC RBC AUTO-ENTMCNC: 31.2 G/DL (ref 31–37)
MCV RBC AUTO: 91.3 FL (ref 74–97)
PLATELET # BLD AUTO: 168 K/UL (ref 135–420)
PMV BLD AUTO: 11.5 FL (ref 9.2–11.8)
POTASSIUM SERPL-SCNC: 4.4 MMOL/L (ref 3.5–5.5)
PROTHROMBIN TIME: 15.4 SEC (ref 11.5–15.2)
RBC # BLD AUTO: 3.2 M/UL (ref 4.7–5.5)
SODIUM SERPL-SCNC: 139 MMOL/L (ref 136–145)
WBC # BLD AUTO: 6.2 K/UL (ref 4.6–13.2)

## 2019-02-14 PROCEDURE — 76705 ECHO EXAM OF ABDOMEN: CPT

## 2019-02-14 PROCEDURE — 80048 BASIC METABOLIC PNL TOTAL CA: CPT

## 2019-02-14 PROCEDURE — 85610 PROTHROMBIN TIME: CPT

## 2019-02-14 PROCEDURE — 36415 COLL VENOUS BLD VENIPUNCTURE: CPT

## 2019-02-14 PROCEDURE — 85730 THROMBOPLASTIN TIME PARTIAL: CPT

## 2019-02-14 PROCEDURE — 85027 COMPLETE CBC AUTOMATED: CPT

## 2019-02-14 RX ORDER — SODIUM CHLORIDE 0.9 % (FLUSH) 0.9 %
10-40 SYRINGE (ML) INJECTION AS NEEDED
Status: CANCELLED | OUTPATIENT
Start: 2019-02-14

## 2019-02-14 RX ORDER — DOXAZOSIN 2 MG/1
2 TABLET ORAL
Qty: 30 TAB | Refills: 2 | Status: SHIPPED | OUTPATIENT
Start: 2019-02-14 | End: 2019-06-05 | Stop reason: SDUPTHER

## 2019-02-14 NOTE — LETTER
[facility] 1970 
 
2/14/2019 Dear Oscar Puga MD 
 
I had the pleasure of evaluating  Mr. Donte Cortes in office today. Below are the relevant portions of my assessment and plan of care. ICD-10-CM ICD-9-CM 1. Uncontrolled hypertension I10 401.9 doxazosin (CARDURA) 2 mg tablet 2. Chronic diastolic congestive heart failure (HCC) I50.32 428.32 ECHO ADULT COMPLETE  
  428.0 3. Pulmonary hypertension I27.20 416.8 4. End stage renal disease (HCC) N18.6 585.6 5. Non compliance w medication regimen Z91.14 V15.81 Current Outpatient Medications Medication Sig Dispense Refill  sucroferric oxyhydroxide (VELPHORO PO) Take  by mouth.  doxazosin (CARDURA) 2 mg tablet Take 1 Tab by mouth nightly. 30 Tab 2  
 isosorbide mononitrate ER (IMDUR) 120 mg CR tablet 1 tab by mouth daily (Patient taking differently: 90 mg. 1 tab by mouth daily) 90 Tab 1  cloNIDine HCl (CATAPRES) 0.3 mg tablet Take 1 Tab by mouth three (3) times daily. 270 Tab 0  
 cinacalcet (SENSIPAR) 60 mg tab Take  by mouth.  hydrALAZINE (APRESOLINE) 100 mg tablet Take 1 Tab by mouth three (3) times daily. 270 Tab 1  
 allopurinol (ZYLOPRIM) 100 mg tablet TAKE 1 TABLET BY MOUTH TWICE DAILY 180 Tab 0  cyclobenzaprine (FLEXERIL) 10 mg tablet Take 0.5 Tabs by mouth two (2) times a day. 30 Tab 3  
 lisinopril (PRINIVIL, ZESTRIL) 40 mg tablet TAKE 1 TABLET BY MOUTH DAILY 90 Tab 0  
 NIFEdipine ER (PROCARDIA XL) 90 mg ER tablet Take 90 mg by mouth daily.  thiamine (VITAMIN B-1) 100 mg tablet Take  by mouth daily.  mupirocin calcium (BACTROBAN NASAL) 2 % nasal ointment by Both Nostrils route two (2) times a day. Indications: as  needed  b complex-vitamin c-folic acid (NEPHROCAPS) 1 mg capsule Take 1 Cap by mouth daily. [Request refills from PCP (Dr. Herve Priest)] 30 Cap 1  carvedilol (COREG) 25 mg tablet TAKE 1 TABLET BY MOUTH TWICE DAILY WITH MEALS 180 Tab 3  
  levETIRAcetam (KEPPRA) 250 mg tablet TAKE 1 TABLET BY MOUTH EVERY Monday, Wednesday, Friday AFTER EACH DIALYSIS 15 Tab 0  
 levETIRAcetam (KEPPRA) 500 mg tablet TAKE 1 TABLET BY MOUTH DAILY 30 Tab 0  
 acetaminophen (TYLENOL) 500 mg tablet Take  by mouth every six (6) hours as needed for Pain.  simethicone (PHAZYME) 180 mg cap Take  by mouth.  oxyCODONE-acetaminophen (PERCOCET) 5-325 mg per tablet Take 1 Tab by mouth every eight (8) hours as needed. Max Daily Amount: 3 Tabs. 20 Tab 0  
 senna-docusate (PERICOLACE) 8.6-50 mg per tablet Take 1 Tab by mouth daily. 30 Tab 3  
 aspirin delayed-release 81 mg tablet Take 1 tablet by mouth daily. 30 tablet 0  
 pantoprazole (PROTONIX) 40 mg tablet Take 1 Tab by mouth Daily (before breakfast). [Request refills from PCP (Dr. Margarito Priest)] 15 Tab 0  cholecalciferol (VITAMIN D3) 1,000 unit tablet Take 2 Tabs by mouth two (2) times a day. [Request refills from PCP (Dr. Margarito Priest)] 60 Tab 0  
 AMINO ACIDS/PROTEIN HYDROLYS (PRO-STAT 64 PO) Take  by mouth. Orders Placed This Encounter  sucroferric oxyhydroxide (VELPHORO PO) Sig: Take  by mouth.  doxazosin (CARDURA) 2 mg tablet Sig: Take 1 Tab by mouth nightly. Dispense:  30 Tab Refill:  2 **Patient requests 90 days supply** If you have questions, please do not hesitate to call me. I look forward to following  Sherrie Anita along with you. Sincerely, Tejas Corcoran MD

## 2019-02-14 NOTE — PATIENT INSTRUCTIONS
Medications Discontinued During This Encounter   Medication Reason    sevelamer carbonate (RENVELA) 800 mg tab tab     doxazosin (CARDURA) 1 mg tablet      After the recommended changes have been made in blood pressure medicines, patient advised to keep BP/HR(pulse rate) chart twice daily and bring us results in next 2 weeks or so. Patient may send the results via \"My Chart\" if desired. Please rest for 5-10 minutes before checking blood pressure    Please discuss with your nephrologist and ask them to remove more fluid during dialysis. Avoiding Triggers With Heart Failure: Care Instructions  Your Care Instructions    Triggers are anything that make your heart failure flare up. A flare-up is also called \"sudden heart failure\" or \"acute heart failure. \" When you have a flare-up, fluid builds up in your lungs, and you have problems breathing. You might need to go to the hospital. By watching for changes in your condition and avoiding triggers, you can prevent heart failure flare-ups. Follow-up care is a key part of your treatment and safety. Be sure to make and go to all appointments, and call your doctor if you are having problems. It's also a good idea to know your test results and keep a list of the medicines you take. How can you care for yourself at home? Watch for changes in your weight and condition  · Weigh yourself without clothing at the same time each day. Record your weight. Call your doctor if you have sudden weight gain, such as more than 2 to 3 pounds in a day or 5 pounds in a week. (Your doctor may suggest a different range of weight gain.) A sudden weight gain may mean that your heart failure is getting worse. · Keep a daily record of your symptoms. Write down any changes in how you feel, such as new shortness of breath, cough, or problems eating.  Also record if your ankles are more swollen than usual and if you feel more tired than usual. Note anything that you ate or did that could have triggered these changes. Limit sodium  Sodium causes your body to hold on to extra water. This may cause your heart failure symptoms to get worse. People get most of their sodium from processed foods. Fast food and restaurant meals also tend to be very high in sodium. · Your doctor may suggest that you limit sodium to 2,000 milligrams (mg) a day or less. That is less than 1 teaspoon of salt a day, including all the salt you eat in cooking or in packaged foods. · Read food labels on cans and food packages. They tell you how much sodium you get in one serving. Check the serving size. If you eat more than one serving, you are getting more sodium. · Be aware that sodium can come in forms other than salt, including monosodium glutamate (MSG), sodium citrate, and sodium bicarbonate (baking soda). MSG is often added to Asian food. You can sometimes ask for food without MSG or salt. · Slowly reducing salt will help you adjust to the taste. Take the salt shaker off the table. · Flavor your food with garlic, lemon juice, onion, vinegar, herbs, and spices instead of salt. Do not use soy sauce, steak sauce, onion salt, garlic salt, mustard, or ketchup on your food, unless it is labeled \"low-sodium\" or \"low-salt. \"  · Make your own salad dressings, sauces, and ketchup without adding salt. · Use fresh or frozen ingredients, instead of canned ones, whenever you can. Choose low-sodium canned goods. · Eat less processed food and food from restaurants, including fast food. Exercise as directed  Moderate, regular exercise is very good for your heart. It improves your blood flow and helps control your weight. But too much exercise can stress your heart and cause a heart failure flare-up. · Check with your doctor before you start an exercise program.  · Walking is an easy way to get exercise. Start out slowly. Gradually increase the length and pace of your walk.  Swimming, riding a bike, and using a treadmill are also good forms of exercise. · When you exercise, watch for signs that your heart is working too hard. You are pushing yourself too hard if you cannot talk while you are exercising. If you become short of breath or dizzy or have chest pain, stop, sit down, and rest.  · Do not exercise when you do not feel well. Take medicines correctly  · Take your medicines exactly as prescribed. Call your doctor if you think you are having a problem with your medicine. · Make a list of all the medicines you take. Include those prescribed to you by other doctors and any over-the-counter medicines, vitamins, or supplements you take. Take this list with you when you go to any doctor. · Take your medicines at the same time every day. It may help you to post a list of all the medicines you take every day and what time of day you take them. · Make taking your medicine as simple as you can. Plan times to take your medicines when you are doing other things, such as eating a meal or getting ready for bed. This will make it easier to remember to take your medicines. · Get organized. Use helpful tools, such as daily or weekly pill containers. When should you call for help? Call 911 if you have symptoms of sudden heart failure such as:    · You have severe trouble breathing.     · You cough up pink, foamy mucus.     · You have a new irregular or rapid heartbeat.    Call your doctor now or seek immediate medical care if:    · You have new or increased shortness of breath.     · You are dizzy or lightheaded, or you feel like you may faint.     · You have sudden weight gain, such as more than 2 to 3 pounds in a day or 5 pounds in a week.  (Your doctor may suggest a different range of weight gain.)     · You have increased swelling in your legs, ankles, or feet.     · You are suddenly so tired or weak that you cannot do your usual activities.    Watch closely for changes in your health, and be sure to contact your doctor if you develop new symptoms. Where can you learn more? Go to http://randa-pavel.info/. Enter Q883 in the search box to learn more about \"Avoiding Triggers With Heart Failure: Care Instructions. \"  Current as of: July 22, 2018  Content Version: 11.9  © 2369-9754 [employer]. Care instructions adapted under license by [employer] (which disclaims liability or warranty for this information). If you have questions about a medical condition or this instruction, always ask your healthcare professional. [address] any warranty or liability for your use of this information.

## 2019-02-14 NOTE — PROGRESS NOTES
Patient didn't bring medications, verbally reviewed      1. Have you been to the ER, urgent care clinic since your last visit? Hospitalized since your last visit? No    2. Have you seen or consulted any other health care providers outside of the [address] [place] [address] since your last visit? Include any pap smears or colon screening. No     3. Since your last visit, have you had any of the following symptoms?      palpitations, shortness of breath and swelling in legs/arms. Explain: occasional arms and ankle swelling    4. Have you had any blood work, X-rays or cardiac testing? Yes Where: [facility] Reason for visit: Gastroenterology Labs     Requested: NO     In [facility]: YES    5. Where do you normally have your labs drawn? [facility]    6. Do you need any refills today?    Yes

## 2019-02-14 NOTE — PROGRESS NOTES
HISTORY OF PRESENT ILLNESS  Mark Felix is a 51 y.o. male. Cardiomyopathy   The history is provided by the medical records (3/18 cath not done yet). Associated symptoms include shortness of breath. Pertinent negatives include no chest pain and no headaches. Hypertension   The history is provided by the medical records. This is a chronic problem. Associated symptoms include shortness of breath. Pertinent negatives include no chest pain and no headaches. Shortness of Breath   The history is provided by the patient. This is a chronic problem. The problem occurs intermittently. The current episode started more than 1 week ago. The problem has been gradually improving. Associated symptoms include leg swelling. Pertinent negatives include no fever, no headaches, no cough, no wheezing, no PND, no orthopnea, no chest pain, no vomiting, no rash and no claudication. The problem's precipitants include exercise (walking in the house; 2/19 200ft). Leg Swelling   The history is provided by the patient. This is a chronic problem. The current episode started more than 1 week ago. The problem occurs every several days. The problem has been gradually improving. Associated symptoms include shortness of breath. Pertinent negatives include no chest pain and no headaches. The symptoms are aggravated by standing. The symptoms are relieved by sleep (HD). CHF   The history is provided by the medical records. This is a chronic problem. Associated symptoms include shortness of breath. Pertinent negatives include no chest pain and no headaches. Review of Systems   Constitutional: Negative for chills, fever, malaise/fatigue and weight loss. HENT: Negative for nosebleeds. Eyes: Negative for discharge. Respiratory: Positive for shortness of breath. Negative for cough and wheezing. Cardiovascular: Positive for leg swelling. Negative for chest pain, palpitations, orthopnea, claudication and PND.    Gastrointestinal: Negative for diarrhea, nausea and vomiting. Genitourinary: Negative for dysuria and hematuria. Musculoskeletal: Negative for joint pain. Skin: Negative for rash. Neurological: Negative for dizziness, seizures, loss of consciousness and headaches. Endo/Heme/Allergies: Negative for polydipsia. Does not bruise/bleed easily. Psychiatric/Behavioral: Negative for depression and substance abuse. The patient does not have insomnia. Allergies   Allergen Reactions    Amlodipine Swelling    Nuts [Tree Nut] Swelling    Pcn [Penicillins] Unknown (comments)    Phenothiazines Other (comments)     Per father, \"He has a parkinsonian reaction to this medication. \"       Past Medical History:   Diagnosis Date    Anemia in chronic kidney disease(285.21) 4/24/2013    Cardiomyopathy due to hypertension ([facility] [address] 4/24/2013    EF 20%    Edema of lower extremity 4/24/2013    End stage renal disease ([facility] [address] 04/19/2013    HD since 9/2003    Hyperphosphatemia 4/24/2013    Hypertension     Hypertension, uncontrolled 4/24/2013    Personal history of atrial flutter 4/24/2013    Pulmonary hypertension ([facility] [address] 4/24/2013    Recommendation refused by patient 4/24/2013    Patient refuses dialysis (hemodialysis or peritoneal dialysis)    Secondary hyperparathyroidism of renal origin ([facility] [address] 4/24/2013    Stroke ([facility] [address]     x2 1/2015    Vitamin D insufficiency 4/25/2013    Vitamin D 25-Hydroxy 20.7        Family History   Problem Relation Age of Onset    Hypertension Mother     Hypertension Father     Heart Attack Neg Hx     Stroke Neg Hx        Social History     Tobacco Use    Smoking status: Never Smoker    Smokeless tobacco: Never Used   Substance Use Topics    Alcohol use: No    Drug use: No        Current Outpatient Medications   Medication Sig    sucroferric oxyhydroxide (VELPHORO PO) Take  by mouth.     isosorbide mononitrate ER (IMDUR) 120 mg CR tablet 1 tab by mouth daily (Patient taking differently: 90 mg. 1 tab by mouth daily)    cloNIDine HCl (CATAPRES) 0.3 mg tablet Take 1 Tab by mouth three (3) times daily.  cinacalcet (SENSIPAR) 60 mg tab Take  by mouth.  hydrALAZINE (APRESOLINE) 100 mg tablet Take 1 Tab by mouth three (3) times daily.  allopurinol (ZYLOPRIM) 100 mg tablet TAKE 1 TABLET BY MOUTH TWICE DAILY    cyclobenzaprine (FLEXERIL) 10 mg tablet Take 0.5 Tabs by mouth two (2) times a day.  lisinopril (PRINIVIL, ZESTRIL) 40 mg tablet TAKE 1 TABLET BY MOUTH DAILY    NIFEdipine ER (PROCARDIA XL) 90 mg ER tablet Take 90 mg by mouth daily.  thiamine (VITAMIN B-1) 100 mg tablet Take  by mouth daily.  doxazosin (CARDURA) 1 mg tablet TAKE 1 TABLET BY MOUTH EVERY NIGHT    mupirocin calcium (BACTROBAN NASAL) 2 % nasal ointment by Both Nostrils route two (2) times a day. Indications: as  needed    b complex-vitamin c-folic acid (NEPHROCAPS) 1 mg capsule Take 1 Cap by mouth daily. [Request refills from PCP (Dr. Alvah Gaucher Barnwell)]    carvedilol (COREG) 25 mg tablet TAKE 1 TABLET BY MOUTH TWICE DAILY WITH MEALS    levETIRAcetam (KEPPRA) 250 mg tablet TAKE 1 TABLET BY MOUTH EVERY Monday, Wednesday, Friday AFTER EACH DIALYSIS    levETIRAcetam (KEPPRA) 500 mg tablet TAKE 1 TABLET BY MOUTH DAILY    acetaminophen (TYLENOL) 500 mg tablet Take  by mouth every six (6) hours as needed for Pain.  simethicone (PHAZYME) 180 mg cap Take  by mouth.  oxyCODONE-acetaminophen (PERCOCET) 5-325 mg per tablet Take 1 Tab by mouth every eight (8) hours as needed. Max Daily Amount: 3 Tabs.  senna-docusate (PERICOLACE) 8.6-50 mg per tablet Take 1 Tab by mouth daily.  aspirin delayed-release 81 mg tablet Take 1 tablet by mouth daily.  pantoprazole (PROTONIX) 40 mg tablet Take 1 Tab by mouth Daily (before breakfast). [Request refills from PCP (Dr. Alvah Gaucher Barnwell)]    cholecalciferol (VITAMIN D3) 1,000 unit tablet Take 2 Tabs by mouth two (2) times a day.  [Request refills from PCP (Dr. Alvah Gaucher Barnwell)]    AMINO ACIDS/PROTEIN HYDROLYS (PRO-STAT 64 PO) Take  by mouth. No current facility-administered medications for this visit. Past Surgical History:   Procedure Laterality Date    HX CSF SHUNT  09/2016    HX HEART CATHETERIZATION  01/2015       Visit Vitals  BP (!) 187/106   Pulse 76   Ht 5' 11\" (1.803 m)   Wt 78.9 kg (174 lb)   BMI 24.27 kg/m²       Diagnostic Studies:  I have reviewed the relevant tests done on the patient and show as follows  EKG tracings reviewed by me today. No flowsheet data found. Mr. Mandeep Pelletier has a reminder for a \"due or due soon\" health maintenance. I have asked that he contact his primary care provider for follow-up on this health maintenance. 4/18 Nuc Stress  Conclusion:   1. Normal perfusion scan. 2. Normal wall motion and ejection fraction. 3. Low risk scan.  12/16 ECHO  SUMMARY:  Left ventricle: Systolic function was moderately to markedly reduced by EF  (biplane method of disks). Ejection fraction was estimated in the range of  35 % to 40 %. There was moderate diffuse hypokinesis. Wall thickness was  markedly increased. Features were consistent with a pseudonormal left  ventricular filling pattern, with concomitant abnormal relaxation and  increased filling pressure (grade 2 diastolic dysfunction). Right ventricle: Systolic pressure was markedly increased. Estimated peak  pressure was at least 70 mmHg. Left atrium: The atrium was severely dilated. Right atrium: The atrium was dilated. Tricuspid valve: There was mild to moderate regurgitation. COMPARISONS:  There has been no significant change. Comparison was made with the  previous study of 27-Jun-2015. Physical Exam   Constitutional: He is oriented to person, place, and time. He appears well-developed and well-nourished. No distress. HENT:   Head: Normocephalic and atraumatic. Mouth/Throat: Normal dentition. Eyes: Right eye exhibits no discharge. Left eye exhibits no discharge.  No scleral icterus. Neck: Neck supple. JVD present. Carotid bruit is not present. No thyromegaly present. Cardiovascular: Normal rate, regular rhythm, S1 normal, S2 normal, normal heart sounds and intact distal pulses. Exam reveals no gallop and no friction rub. No murmur heard. Pulmonary/Chest: Effort normal and breath sounds normal. He has no wheezes. He has no rales. Abdominal: Soft. He exhibits distension. He exhibits no mass. There is no tenderness. +ascites   Musculoskeletal: He exhibits edema (2+). Lymphadenopathy:        Right cervical: No superficial cervical adenopathy present. Left cervical: No superficial cervical adenopathy present. Neurological: He is alert and oriented to person, place, and time. Skin: Skin is warm and dry. No rash noted. Psychiatric: He has a normal mood and affect. His behavior is normal.       ASSESSMENT and PLAN    Patient education included a review of the importance of compliance in the treatment regimen. Patient did seem to be compliant now with his present blood pressure medicines    Increase Cardura to hopefully better control the blood pressure. Discussed with patient that more fluid needs to be removed and dialysis as he still has significant edema it will help in controlling the blood pressure as well as reducing the need for repeated paracentesis. Once there is no significant edema and ascites is still a problem, TIPS can be considered. Diagnoses and all orders for this visit:    1. Uncontrolled hypertension  -     doxazosin (CARDURA) 2 mg tablet; Take 1 Tab by mouth nightly. 2. Chronic diastolic congestive heart failure ([address]  -     ECHO ADULT COMPLETE; Future    3. Pulmonary hypertension    4. End stage renal disease ([address]    5. Non compliance w medication regimen        Pertinent laboratory and test data reviewed and discussed with patient.   See patient instructions also for other medical advice given    Medications Discontinued During This Encounter   Medication Reason    sevelamer carbonate (RENVELA) 800 mg tab tab     doxazosin (CARDURA) 1 mg tablet        Follow-up Disposition:  Return in about 3 months (around 5/14/2019), or if symptoms worsen or fail to improve, for post test.

## 2019-02-27 ENCOUNTER — HOSPITAL ENCOUNTER (OUTPATIENT)
Dept: ULTRASOUND IMAGING | Age: 49
Discharge: HOME | End: 2019-02-27
Attending: INTERNAL MEDICINE
Payer: MEDICARE

## 2019-02-27 DIAGNOSIS — R18.8 OTHER ASCITES: ICD-10-CM

## 2019-02-27 PROCEDURE — 49083 ABD PARACENTESIS W/IMAGING: CPT

## 2019-02-27 PROCEDURE — 74011250636 HC RX REV CODE- 250/636: Performed by: PHYSICIAN ASSISTANT

## 2019-02-27 RX ORDER — LIDOCAINE HYDROCHLORIDE 10 MG/ML
30 INJECTION, SOLUTION EPIDURAL; INFILTRATION; INTRACAUDAL; PERINEURAL
Status: COMPLETED | OUTPATIENT
Start: 2019-02-27 | End: 2019-02-27

## 2019-02-27 RX ADMIN — LIDOCAINE HYDROCHLORIDE 10 ML: 10 INJECTION, SOLUTION EPIDURAL; INFILTRATION; INTRACAUDAL; PERINEURAL at 13:20

## 2019-03-07 ENCOUNTER — HOSPITAL ENCOUNTER (OUTPATIENT)
Dept: INFUSION THERAPY | Age: 49
End: 2019-03-07

## 2019-03-12 DIAGNOSIS — I10 HYPERTENSION, UNCONTROLLED: Chronic | ICD-10-CM

## 2019-03-12 RX ORDER — HYDRALAZINE HYDROCHLORIDE 100 MG/1
TABLET, FILM COATED ORAL
Qty: 270 TAB | Refills: 0 | Status: SHIPPED | OUTPATIENT
Start: 2019-03-12 | End: 2019-06-05 | Stop reason: SDUPTHER

## 2019-03-18 RX ORDER — CLONIDINE HYDROCHLORIDE 0.3 MG/1
0.3 TABLET ORAL 3 TIMES DAILY
Qty: 270 TAB | Refills: 1 | Status: SHIPPED | OUTPATIENT
Start: 2019-03-18

## 2019-03-26 ENCOUNTER — HOSPITAL ENCOUNTER (OUTPATIENT)
Dept: ULTRASOUND IMAGING | Age: 49
Discharge: HOME | End: 2019-03-26
Attending: INTERNAL MEDICINE
Payer: MEDICARE

## 2019-03-26 ENCOUNTER — HOSPITAL ENCOUNTER (OUTPATIENT)
Dept: LAB | Age: 49
Discharge: HOME | End: 2019-03-26
Attending: INTERNAL MEDICINE
Payer: MEDICARE

## 2019-03-26 DIAGNOSIS — R18.8 OTHER ASCITES: ICD-10-CM

## 2019-03-26 LAB
APTT PPP: 32.3 SEC (ref 23–36.4)
ERYTHROCYTE [DISTWIDTH] IN BLOOD BY AUTOMATED COUNT: 19.3 % (ref 11.6–14.5)
HCT VFR BLD AUTO: 35.8 % (ref 36–48)
HGB BLD-MCNC: 11.1 G/DL (ref 13–16)
INR PPP: 1.2 (ref 0.8–1.2)
MCH RBC QN AUTO: 28.2 PG (ref 24–34)
MCHC RBC AUTO-ENTMCNC: 31 G/DL (ref 31–37)
MCV RBC AUTO: 91.1 FL (ref 74–97)
PLATELET # BLD AUTO: 194 K/UL (ref 135–420)
PMV BLD AUTO: 12.5 FL (ref 9.2–11.8)
PROTHROMBIN TIME: 15.1 SEC (ref 11.5–15.2)
RBC # BLD AUTO: 3.93 M/UL (ref 4.7–5.5)
WBC # BLD AUTO: 7 K/UL (ref 4.6–13.2)

## 2019-03-26 PROCEDURE — 76705 ECHO EXAM OF ABDOMEN: CPT

## 2019-03-26 PROCEDURE — 85730 THROMBOPLASTIN TIME PARTIAL: CPT

## 2019-03-26 PROCEDURE — 85610 PROTHROMBIN TIME: CPT

## 2019-03-26 PROCEDURE — 85027 COMPLETE CBC AUTOMATED: CPT

## 2019-03-26 PROCEDURE — 36415 COLL VENOUS BLD VENIPUNCTURE: CPT

## 2019-03-28 ENCOUNTER — HOSPITAL ENCOUNTER (OUTPATIENT)
Dept: INFUSION THERAPY | Age: 49
End: 2019-03-28

## 2019-04-18 ENCOUNTER — APPOINTMENT (OUTPATIENT)
Dept: INFUSION THERAPY | Age: 49
End: 2019-04-18

## 2019-04-24 ENCOUNTER — HOSPITAL ENCOUNTER (OUTPATIENT)
Dept: ULTRASOUND IMAGING | Age: 49
Discharge: HOME | End: 2019-04-24
Attending: INTERNAL MEDICINE
Payer: MEDICARE

## 2019-04-24 DIAGNOSIS — R18.8 OTHER ASCITES: ICD-10-CM

## 2019-04-24 PROCEDURE — 74011250636 HC RX REV CODE- 250/636: Performed by: INTERNAL MEDICINE

## 2019-04-24 PROCEDURE — 49083 ABD PARACENTESIS W/IMAGING: CPT

## 2019-04-24 PROCEDURE — 76705 ECHO EXAM OF ABDOMEN: CPT

## 2019-04-24 RX ORDER — LIDOCAINE HYDROCHLORIDE 10 MG/ML
30 INJECTION, SOLUTION EPIDURAL; INFILTRATION; INTRACAUDAL; PERINEURAL
Status: COMPLETED | OUTPATIENT
Start: 2019-04-24 | End: 2019-04-24

## 2019-04-24 RX ADMIN — LIDOCAINE HYDROCHLORIDE 10 ML: 10 INJECTION, SOLUTION EPIDURAL; INFILTRATION; INTRACAUDAL; PERINEURAL at 13:01

## 2019-04-24 NOTE — H&P
OUTPATIENT HISTORY AND PHYSICAL      Today 4/24/2019     Indication/Symptoms:   Loreto Rahman is a 51 y.o. male with recurrent ascites who presents for an US-guided paracentesis. Current Meds:    Prior to Admission medications    Medication Sig Start Date End Date Taking? Authorizing Provider   cloNIDine HCl (CATAPRES) 0.3 mg tablet Take 1 Tab by mouth three (3) times daily. 3/18/19   Adalberto Cuenca NP   hydrALAZINE (APRESOLINE) 100 mg tablet TAKE 1 TABLET BY MOUTH THREE TIMES DAILY. 3/12/19   Rodrigo Dunne MD   sucroferric oxyhydroxide (VELPHORO PO) Take  by mouth. Provider, Historical   doxazosin (CARDURA) 2 mg tablet Take 1 Tab by mouth nightly. 2/14/19   Rodrigo Dunne MD   isosorbide mononitrate ER (IMDUR) 120 mg CR tablet 1 tab by mouth daily  Patient taking differently: 90 mg. 1 tab by mouth daily 1/14/19   Javier Cuenca NP   cinacalcet (SENSIPAR) 60 mg tab Take  by mouth. Provider, Historical   allopurinol (ZYLOPRIM) 100 mg tablet TAKE 1 TABLET BY MOUTH TWICE DAILY 3/15/18   Elinor Nunes MD   cyclobenzaprine (FLEXERIL) 10 mg tablet Take 0.5 Tabs by mouth two (2) times a day. 5/4/17   Sophia De Jesus MD   lisinopril (PRINIVIL, ZESTRIL) 40 mg tablet TAKE 1 TABLET BY MOUTH DAILY 4/19/17   Sophia De Jesus MD   NIFEdipine ER (PROCARDIA XL) 90 mg ER tablet Take 90 mg by mouth daily. Provider, Historical   thiamine (VITAMIN B-1) 100 mg tablet Take  by mouth daily. Provider, Historical   AMINO ACIDS/PROTEIN HYDROLYS (PRO-STAT 64 PO) Take  by mouth. Provider, Historical   mupirocin calcium (BACTROBAN NASAL) 2 % nasal ointment by Both Nostrils route two (2) times a day. Indications: as  needed    Provider, Historical   b complex-vitamin c-folic acid (NEPHROCAPS) 1 mg capsule Take 1 Cap by mouth daily.  [Request refills from PCP (Dr. Javi Priest)] 3/31/17   Sophia De Jesus MD   carvedilol (COREG) 25 mg tablet TAKE 1 TABLET BY MOUTH TWICE DAILY WITH MEALS 3/31/17 Kirill Harris MD   levETIRAcetam (KEPPRA) 250 mg tablet TAKE 1 TABLET BY MOUTH EVERY Monday, Wednesday, Friday AFTER [facility] DIALYSIS 12/8/16   Ann Marie Doherty MD   levETIRAcetam (KEPPRA) 500 mg tablet TAKE 1 TABLET BY MOUTH DAILY 12/8/16   Ann Marie Doherty MD   acetaminophen (TYLENOL) 500 mg tablet Take  by mouth every six (6) hours as needed for Pain. Provider, Historical   simethicone (PHAZYME) 180 mg cap Take  by mouth. Provider, Historical   oxyCODONE-acetaminophen (PERCOCET) 5-325 mg per tablet Take 1 Tab by mouth every eight (8) hours as needed. Max Daily Amount: 3 Tabs. 9/22/16   Benja Arredondo MD   senna-docusate (PERICOLACE) 8.6-50 mg per tablet Take 1 Tab by mouth daily. 3/1/16   Kirill Harris MD   aspirin delayed-release 81 mg tablet Take 1 tablet by mouth daily. 1/22/15   Jasmin Chávez MD   pantoprazole (PROTONIX) 40 mg tablet Take 1 Tab by mouth Daily (before breakfast). [Request refills from PCP (Dr. Monse Priest)] 5/8/13   Michael Velasquez MD   cholecalciferol (VITAMIN D3) 1,000 unit tablet Take 2 Tabs by mouth two (2) times a day. [Request refills from PCP (Dr. Monse Priest)] 5/8/13   Michael Velasquez MD       Allergies: Allergies   Allergen Reactions    Amlodipine Swelling    Nuts [Tree Nut] Swelling    Pcn [Penicillins] Unknown (comments)    Phenothiazines Other (comments)     Per father, \"He has a parkinsonian reaction to this medication. \"       Comorbid Conditions:    Past Medical History:   Diagnosis Date    Anemia in chronic kidney disease(285.21) 4/24/2013    Cardiomyopathy due to hypertension ([facility] [address] 4/24/2013    EF 20%    Edema of lower extremity 4/24/2013    End stage renal disease ([facility] [address] 04/19/2013    HD since 9/2003    Hyperphosphatemia 4/24/2013    Hypertension     Hypertension, uncontrolled 4/24/2013    Personal history of atrial flutter 4/24/2013    Pulmonary hypertension ([address] 4/24/2013    Recommendation refused by patient 4/24/2013    Patient refuses dialysis (hemodialysis or peritoneal dialysis)    Secondary hyperparathyroidism of renal origin ([facility] [address] 4/24/2013    Stroke ([facility] [address]     x2 1/2015    Vitamin D insufficiency 4/25/2013    Vitamin D 25-Hydroxy 20.7           Past Surgical History:   Procedure Laterality Date    HX CSF SHUNT  09/2016    HX HEART CATHETERIZATION  01/2015     Data:    There were no vitals taken for this visit.:  No results for input(s): PLT, PLTEXT in the last 72 hours. No lab exists for component:  HCT  No results for input(s): INR, APTT in the last 72 hours. No lab exists for component: PT, INREXT    The H & P and/or progress notes and any available imaging were reviewed. The risks, indications and possible alternatives to the procedure, including doing nothing, were discussed and informed consent was obtained. Physical Exam:      Mental status:   Alert and oriented. Heart:   Regular rate. Lungs:  Normal respiratory effort. The patient is an appropriate candidate to undergo the planned procedure.      [place], [id number]

## 2019-04-24 NOTE — PROCEDURES
RADIOLOGY POST PARACENTESIS NOTE     April 24, 2019       3:31 PM     Preoperative Diagnosis:   Ascites    Postoperative Diagnosis:  Same. :  Prem Ortiz PA-C    Assistant:  None. Type of Anesthesia: 1% plain lidocaine    Procedure/Description:  US-Guided paracentesis    Findings:  Using ultrasound guidance the largest pocket of peritoneal fluid was localized and marked at the left lower quadrant. The patient was prepped and draped in the usual fashion. 1% Lidocaine was infiltrated locally. A 5 Romansh over the needle catheter was advanced into the peritoneal cavity and dark yellow colored fluid was aspirated. Once fluid was easily aspirated, the needle was removed leaving the catheter in place. The catheter was connected to vacuum containers and 2.9 liters of ascitic fluid was removed.     Estimated blood Loss:  Minimal    Specimen Removed:   No    Complications: None    Condition: Stable    Discharge Plan:  discharge home     Jessica Ivan

## 2019-05-09 ENCOUNTER — OFFICE VISIT (OUTPATIENT)
Dept: CARDIOLOGY CLINIC | Age: 49
End: 2019-05-09

## 2019-05-09 VITALS
BODY MASS INDEX: 25.23 KG/M2 | SYSTOLIC BLOOD PRESSURE: 182 MMHG | DIASTOLIC BLOOD PRESSURE: 104 MMHG | WEIGHT: 180.2 LBS | HEART RATE: 81 BPM | HEIGHT: 71 IN

## 2019-05-09 DIAGNOSIS — I27.20 PULMONARY HYPERTENSION (HCC): ICD-10-CM

## 2019-05-09 DIAGNOSIS — I10 HYPERTENSION, UNCONTROLLED: ICD-10-CM

## 2019-05-09 DIAGNOSIS — N18.6 END STAGE RENAL DISEASE (HCC): ICD-10-CM

## 2019-05-09 DIAGNOSIS — R18.8 OTHER ASCITES: ICD-10-CM

## 2019-05-09 DIAGNOSIS — Z91.14 NON COMPLIANCE W MEDICATION REGIMEN: ICD-10-CM

## 2019-05-09 DIAGNOSIS — I50.32 CHRONIC DIASTOLIC CONGESTIVE HEART FAILURE (HCC): Primary | ICD-10-CM

## 2019-05-09 NOTE — PROGRESS NOTES
HISTORY OF PRESENT ILLNESS  Ingrid Diego is a 51 y.o. male. Cardiomyopathy   The history is provided by the medical records (3/18 cath not done yet). Associated symptoms include shortness of breath. Pertinent negatives include no chest pain and no headaches. CHF   The history is provided by the medical records. This is a chronic problem. Associated symptoms include shortness of breath. Pertinent negatives include no chest pain and no headaches. Hypertension   The history is provided by the medical records. This is a chronic problem. Associated symptoms include shortness of breath. Pertinent negatives include no chest pain and no headaches. Shortness of Breath   The history is provided by the patient. This is a chronic problem. The problem occurs intermittently. The current episode started more than 1 week ago. The problem has not changed since onset. Associated symptoms include leg swelling. Pertinent negatives include no fever, no headaches, no cough, no wheezing, no PND, no orthopnea, no chest pain, no vomiting, no rash and no claudication. The problem's precipitants include exercise (walking in the house; 2/19 200ft). Leg Swelling   The history is provided by the patient. This is a chronic problem. The current episode started more than 1 week ago. The problem occurs every several days. The problem has not changed since onset. Associated symptoms include shortness of breath. Pertinent negatives include no chest pain and no headaches. The symptoms are aggravated by standing. The symptoms are relieved by sleep (HD). Review of Systems   Constitutional: Negative for chills, fever, malaise/fatigue and weight loss. HENT: Negative for nosebleeds. Eyes: Negative for discharge. Respiratory: Positive for shortness of breath. Negative for cough and wheezing. Cardiovascular: Positive for leg swelling. Negative for chest pain, palpitations, orthopnea, claudication and PND.    Gastrointestinal: Negative for diarrhea, nausea and vomiting. Genitourinary: Negative for dysuria and hematuria. Musculoskeletal: Negative for joint pain. Skin: Negative for rash. Neurological: Negative for dizziness, seizures, loss of consciousness and headaches. Endo/Heme/Allergies: Negative for polydipsia. Does not bruise/bleed easily. Psychiatric/Behavioral: Negative for depression and substance abuse. The patient does not have insomnia. Allergies   Allergen Reactions    Amlodipine Swelling    Nuts [Tree Nut] Swelling    Pcn [Penicillins] Unknown (comments)    Phenothiazines Other (comments)     Per father, \"He has a parkinsonian reaction to this medication. \"       Past Medical History:   Diagnosis Date    Anemia in chronic kidney disease(285.21) 4/24/2013    Cardiomyopathy due to hypertension ([facility] 4/24/2013    EF 20%    Edema of lower extremity 4/24/2013    End stage renal disease ([facility] 04/19/2013    HD since 9/2003    Hyperphosphatemia 4/24/2013    Hypertension     Hypertension, uncontrolled 4/24/2013    Personal history of atrial flutter 4/24/2013    Pulmonary hypertension ([facility] 4/24/2013    Recommendation refused by patient 4/24/2013    Patient refuses dialysis (hemodialysis or peritoneal dialysis)    Secondary hyperparathyroidism of renal origin ([facility] 4/24/2013    Stroke ([facility]     x2 1/2015    Vitamin D insufficiency 4/25/2013    Vitamin D 25-Hydroxy 20.7        Family History   Problem Relation Age of Onset    Hypertension Mother     Hypertension Father     Heart Attack Neg Hx     Stroke Neg Hx        Social History     Tobacco Use    Smoking status: Never Smoker    Smokeless tobacco: Never Used   Substance Use Topics    Alcohol use: No    Drug use: No        Current Outpatient Medications   Medication Sig    cloNIDine HCl (CATAPRES) 0.3 mg tablet Take 1 Tab by mouth three (3) times daily.  hydrALAZINE (APRESOLINE) 100 mg tablet TAKE 1 TABLET BY MOUTH THREE TIMES DAILY.     sucroferric oxyhydroxide (VELPHORO PO) Take  by mouth.  doxazosin (CARDURA) 2 mg tablet Take 1 Tab by mouth nightly.  isosorbide mononitrate ER (IMDUR) 120 mg CR tablet 1 tab by mouth daily (Patient taking differently: 90 mg. 1 tab by mouth daily)    cinacalcet (SENSIPAR) 60 mg tab Take  by mouth.  allopurinol (ZYLOPRIM) 100 mg tablet TAKE 1 TABLET BY MOUTH TWICE DAILY    cyclobenzaprine (FLEXERIL) 10 mg tablet Take 0.5 Tabs by mouth two (2) times a day.  lisinopril (PRINIVIL, ZESTRIL) 40 mg tablet TAKE 1 TABLET BY MOUTH DAILY    NIFEdipine ER (PROCARDIA XL) 90 mg ER tablet Take 90 mg by mouth daily.  thiamine (VITAMIN B-1) 100 mg tablet Take  by mouth daily.  AMINO ACIDS/PROTEIN HYDROLYS (PRO-STAT 64 PO) Take  by mouth.  mupirocin calcium (BACTROBAN NASAL) 2 % nasal ointment by Both Nostrils route two (2) times a day. Indications: as  needed    b complex-vitamin c-folic acid (NEPHROCAPS) 1 mg capsule Take 1 Cap by mouth daily. [Request refills from PCP (Dr. Marlyn Priest)]    carvedilol (COREG) 25 mg tablet TAKE 1 TABLET BY MOUTH TWICE DAILY WITH MEALS    levETIRAcetam (KEPPRA) 250 mg tablet TAKE 1 TABLET BY MOUTH EVERY Monday, Wednesday, Friday AFTER EACH DIALYSIS    levETIRAcetam (KEPPRA) 500 mg tablet TAKE 1 TABLET BY MOUTH DAILY    acetaminophen (TYLENOL) 500 mg tablet Take  by mouth every six (6) hours as needed for Pain.  simethicone (PHAZYME) 180 mg cap Take  by mouth.  senna-docusate (PERICOLACE) 8.6-50 mg per tablet Take 1 Tab by mouth daily.  pantoprazole (PROTONIX) 40 mg tablet Take 1 Tab by mouth Daily (before breakfast). [Request refills from PCP (Dr. Marlyn Priest)]    cholecalciferol (VITAMIN D3) 1,000 unit tablet Take 2 Tabs by mouth two (2) times a day. [Request refills from PCP (Dr. [place] Locker Zayda)]     No current facility-administered medications for this visit.          Past Surgical History:   Procedure Laterality Date    HX CSF SHUNT  09/2016  HX HEART CATHETERIZATION  01/2015       Visit Vitals  BP (!) 182/104   Pulse 81   Ht 5' 11\" (1.803 m)   Wt 81.7 kg (180 lb 3.2 oz)   BMI 25.13 kg/m²       Diagnostic Studies:  I have reviewed the relevant tests done on the patient and show as follows  EKG tracings reviewed by me today. No flowsheet data found. Mr. Felton Multani has a reminder for a \"due or due soon\" health maintenance. I have asked that he contact his primary care provider for follow-up on this health maintenance. 4/18 Nuc Stress  Conclusion:   1. Normal perfusion scan. 2. Normal wall motion and ejection fraction. 3. Low risk scan.  12/16 ECHO  SUMMARY:  Left ventricle: Systolic function was moderately to markedly reduced by EF  (biplane method of disks). Ejection fraction was estimated in the range of  35 % to 40 %. There was moderate diffuse hypokinesis. Wall thickness was  markedly increased. Features were consistent with a pseudonormal left  ventricular filling pattern, with concomitant abnormal relaxation and  increased filling pressure (grade 2 diastolic dysfunction). Right ventricle: Systolic pressure was markedly increased. Estimated peak  pressure was at least 70 mmHg. Left atrium: The atrium was severely dilated. Right atrium: The atrium was dilated. Tricuspid valve: There was mild to moderate regurgitation. COMPARISONS:  There has been no significant change. Comparison was made with the  previous study of 27-Jun-2015. Physical Exam   Constitutional: He is oriented to person, place, and time. He appears well-developed and well-nourished. No distress. HENT:   Head: Normocephalic and atraumatic. Mouth/Throat: Normal dentition. Eyes: Right eye exhibits no discharge. Left eye exhibits no discharge. No scleral icterus. Neck: Neck supple. JVD present. Carotid bruit is not present. No thyromegaly present.    Cardiovascular: Normal rate, regular rhythm, S1 normal, S2 normal, normal heart sounds and intact distal pulses. Exam reveals no gallop and no friction rub. No murmur heard. Pulmonary/Chest: Effort normal and breath sounds normal. He has no wheezes. He has no rales. Abdominal: Soft. He exhibits distension. He exhibits no mass. There is no tenderness. +ascites   Musculoskeletal: He exhibits edema (2+). Lymphadenopathy:        Right cervical: No superficial cervical adenopathy present. Left cervical: No superficial cervical adenopathy present. Neurological: He is alert and oriented to person, place, and time. Skin: Skin is warm and dry. No rash noted. Psychiatric: He has a normal mood and affect. His behavior is normal.       ASSESSMENT and PLAN    Patient education included a review of the importance of compliance in the treatment regimen. Patient did seem to be compliant now with his present blood pressure medicines    Patient has been noncompliant with medications and missing dialysis off and on. Discussed with patient that more fluid needs to be removed and dialysis as he still has significant edema it will help in controlling the blood pressure as well as reducing the need for repeated paracentesis. Once there is no significant edema and ascites is still a problem, TIPS can be considered. Patient will think about TI PS. Advised to go to emergency room if blood pressure is uncontrolled but it always improves once he takes his medications. Diagnoses and all orders for this visit:    1. Chronic diastolic congestive heart failure (HCC)  -     AMB POC EKG ROUTINE W/ 12 LEADS, INTER & REP    2. Hypertension, uncontrolled    3. Pulmonary hypertension    4. End stage renal disease ([facility] [address]    5. Non compliance w medication regimen    6. Other ascites        Pertinent laboratory and test data reviewed and discussed with patient.   See patient instructions also for other medical advice given    Medications Discontinued During This Encounter   Medication Reason    aspirin delayed-release 81 mg tablet     oxyCODONE-acetaminophen (PERCOCET) 5-325 mg per tablet        Follow-up and Dispositions    · Return in about 6 months (around 11/9/2019), or if symptoms worsen or fail to improve.

## 2019-05-09 NOTE — PATIENT INSTRUCTIONS
Medications Discontinued During This Encounter   Medication Reason    aspirin delayed-release 81 mg tablet     oxyCODONE-acetaminophen (PERCOCET) 5-325 mg per tablet      Please do not miss dialysis or medications     High Blood Pressure: Care Instructions  Overview    It's normal for blood pressure to go up and down throughout the day. But if it stays up, you have high blood pressure. Another name for high blood pressure is hypertension. Despite what a lot of people think, high blood pressure usually doesn't cause headaches or make you feel dizzy or lightheaded. It usually has no symptoms. But it does increase your risk of stroke, heart attack, and other problems. You and your doctor will talk about your risks of these problems based on your blood pressure. Your doctor will give you a goal for your blood pressure. Your goal will be based on your health and your age. Lifestyle changes, such as eating healthy and being active, are always important to help lower blood pressure. You might also take medicine to reach your blood pressure goal.  Follow-up care is a key part of your treatment and safety. Be sure to make and go to all appointments, and call your doctor if you are having problems. It's also a good idea to know your test results and keep a list of the medicines you take. How can you care for yourself at home? Medical treatment  · If you stop taking your medicine, your blood pressure will go back up. You may take one or more types of medicine to lower your blood pressure. Be safe with medicines. Take your medicine exactly as prescribed. Call your doctor if you think you are having a problem with your medicine. · Talk to your doctor before you start taking aspirin every day. Aspirin can help certain people lower their risk of a heart attack or stroke. But taking aspirin isn't right for everyone, because it can cause serious bleeding. · See your doctor regularly.  You may need to see the doctor more often at first or until your blood pressure comes down. · If you are taking blood pressure medicine, talk to your doctor before you take decongestants or anti-inflammatory medicine, such as ibuprofen. Some of these medicines can raise blood pressure. · Learn how to check your blood pressure at home. Lifestyle changes  · Stay at a healthy weight. This is especially important if you put on weight around the waist. Losing even 10 pounds can help you lower your blood pressure. · If your doctor recommends it, get more exercise. Walking is a good choice. Bit by bit, increase the amount you walk every day. Try for at least 30 minutes on most days of the week. You also may want to swim, bike, or do other activities. · Avoid or limit alcohol. Talk to your doctor about whether you can drink any alcohol. · Try to limit how much sodium you eat to less than 2,300 milligrams (mg) a day. Your doctor may ask you to try to eat less than 1,500 mg a day. · Eat plenty of fruits (such as bananas and oranges), vegetables, legumes, whole grains, and low-fat dairy products. · Lower the amount of saturated fat in your diet. Saturated fat is found in animal products such as milk, cheese, and meat. Limiting these foods may help you lose weight and also lower your risk for heart disease. · Do not smoke. Smoking increases your risk for heart attack and stroke. If you need help quitting, talk to your doctor about stop-smoking programs and medicines. These can increase your chances of quitting for good. When should you call for help? Call 911 anytime you think you may need emergency care. This may mean having symptoms that suggest that your blood pressure is causing a serious heart or blood vessel problem. Your blood pressure may be over 180/120.   For example, call 911 if:    · You have symptoms of a heart attack. These may include:  ? Chest pain or pressure, or a strange feeling in the chest.  ? Sweating. ?  Shortness of breath. ? Nausea or vomiting. ? Pain, pressure, or a strange feeling in the back, neck, jaw, or upper belly or in one or both shoulders or arms. ? Lightheadedness or sudden weakness. ? A fast or irregular heartbeat.     · You have symptoms of a stroke. These may include:  ? Sudden numbness, tingling, weakness, or loss of movement in your face, arm, or leg, especially on only one side of your body. ? Sudden vision changes. ? Sudden trouble speaking. ? Sudden confusion or trouble understanding simple statements. ? Sudden problems with walking or balance. ? A sudden, severe headache that is different from past headaches.     · You have severe back or belly pain.    Do not wait until your blood pressure comes down on its own. Get help right away.   Call your doctor now or seek immediate care if:    · Your blood pressure is much higher than normal (such as 180/120 or higher), but you don't have symptoms.     · You think high blood pressure is causing symptoms, such as:  ? Severe headache.  ? Blurry vision.    Watch closely for changes in your health, and be sure to contact your doctor if:    · Your blood pressure measures higher than your doctor recommends at least 2 times. That means the top number is higher or the bottom number is higher, or both.     · You think you may be having side effects from your blood pressure medicine. Where can you learn more? Go to http://randa-pavel.info/. Enter R220 in the search box to learn more about \"High Blood Pressure: Care Instructions. \"  Current as of: July 22, 2018  Content Version: 11.9  © 3133-8353 [employer]. Care instructions adapted under license by [employer] (which disclaims liability or warranty for this information).  If you have questions about a medical condition or this instruction, always ask your healthcare professional. [place] [address] any warranty or liability for your use of this information.

## 2019-05-09 NOTE — PROGRESS NOTES
Patient didn't bring medications, verbally reviewed      1. Have you been to the ER, urgent care clinic since your last visit? Hospitalized since your last visit?    no    2. Have you seen or consulted any other health care providers outside of the [address] [place] [address] since your last visit? Include any pap smears or colon screening. No     3. Since your last visit, have you had any of the following symptoms? shortness of breath and swelling in legs/arms. Explain:yvette dean has gone done on right arm     4. Have you had any blood work, X-rays or cardiac testing? No       5. Where do you normally have your labs drawn? [address]    6. Do you need any refills today?    no

## 2019-05-30 ENCOUNTER — HOSPITAL ENCOUNTER (OUTPATIENT)
Dept: LAB | Age: 49
Discharge: HOME | End: 2019-05-30

## 2019-05-30 ENCOUNTER — HOSPITAL ENCOUNTER (OUTPATIENT)
Dept: ULTRASOUND IMAGING | Age: 49
Discharge: HOME | End: 2019-05-30
Attending: INTERNAL MEDICINE
Payer: MEDICARE

## 2019-05-30 DIAGNOSIS — R18.8 OTHER ASCITES: ICD-10-CM

## 2019-05-30 LAB
APTT PPP: 37.4 SEC (ref 23–36.4)
ERYTHROCYTE [DISTWIDTH] IN BLOOD BY AUTOMATED COUNT: 19.1 % (ref 11.6–14.5)
HCT VFR BLD AUTO: 26.8 % (ref 36–48)
HGB BLD-MCNC: 8.3 G/DL (ref 13–16)
INR PPP: 1.3 (ref 0.8–1.2)
MCH RBC QN AUTO: 28 PG (ref 24–34)
MCHC RBC AUTO-ENTMCNC: 31 G/DL (ref 31–37)
MCV RBC AUTO: 90.5 FL (ref 74–97)
PLATELET # BLD AUTO: 235 K/UL (ref 135–420)
PMV BLD AUTO: 11 FL (ref 9.2–11.8)
PROTHROMBIN TIME: 16.1 SEC (ref 11.5–15.2)
RBC # BLD AUTO: 2.96 M/UL (ref 4.7–5.5)
WBC # BLD AUTO: 6.4 K/UL (ref 4.6–13.2)

## 2019-05-30 PROCEDURE — 85730 THROMBOPLASTIN TIME PARTIAL: CPT

## 2019-05-30 PROCEDURE — 85610 PROTHROMBIN TIME: CPT

## 2019-05-30 PROCEDURE — 36415 COLL VENOUS BLD VENIPUNCTURE: CPT

## 2019-05-30 PROCEDURE — 49083 ABD PARACENTESIS W/IMAGING: CPT

## 2019-05-30 PROCEDURE — 85027 COMPLETE CBC AUTOMATED: CPT

## 2019-05-30 RX ORDER — LIDOCAINE HYDROCHLORIDE 10 MG/ML
30 INJECTION, SOLUTION EPIDURAL; INFILTRATION; INTRACAUDAL; PERINEURAL
Status: ACTIVE | OUTPATIENT
Start: 2019-05-30 | End: 2019-05-30

## 2019-06-05 DIAGNOSIS — I10 UNCONTROLLED HYPERTENSION: ICD-10-CM

## 2019-06-05 DIAGNOSIS — I10 HYPERTENSION, UNCONTROLLED: Chronic | ICD-10-CM

## 2019-06-05 RX ORDER — DOXAZOSIN 2 MG/1
TABLET ORAL
Qty: 90 TAB | Refills: 0 | Status: SHIPPED | OUTPATIENT
Start: 2019-06-05

## 2019-06-05 RX ORDER — HYDRALAZINE HYDROCHLORIDE 100 MG/1
TABLET, FILM COATED ORAL
Qty: 270 TAB | Refills: 0 | Status: SHIPPED | OUTPATIENT
Start: 2019-06-05 | End: 2019-01-01 | Stop reason: SDUPTHER

## 2019-06-25 NOTE — PROGRESS NOTES
Spoke to patient and he states he just had fluid drained from his stomach yesterday through IR. He states he has not been having trouble with his access, and would like to cancel surgery at this time.

## 2019-07-18 NOTE — H&P
OUTPATIENT HISTORY AND PHYSICAL      Today 7/18/2019     Indication/Symptoms:   Lon Chapman is a 51 y.o. male with recurrent ascites who presents for an US-guided paracentesis. Current Meds:    Prior to Admission medications    Medication Sig Start Date End Date Taking? Authorizing Provider   isosorbide mononitrate ER (IMDUR) 120 mg CR tablet Take 1 Tab by mouth every morning. 1 tab by mouth daily 7/5/19   Shira Cuenca NP   doxazosin (CARDURA) 2 mg tablet TAKE ONE TABLET BY MOUTH NIGHTLY 6/5/19   Daisy Cohen MD   hydrALAZINE (APRESOLINE) 100 mg tablet TAKE 1 TABLET BY MOUTH THREE TIMES DAILY. 6/5/19   Daisy Cohen MD   cloNIDine HCl (CATAPRES) 0.3 mg tablet Take 1 Tab by mouth three (3) times daily. 3/18/19   Adalberto Cuenca NP   sucroferric oxyhydroxide (VELPHORO PO) Take  by mouth. Provider, Historical   cinacalcet (SENSIPAR) 60 mg tab Take  by mouth. Provider, Historical   allopurinol (ZYLOPRIM) 100 mg tablet TAKE 1 TABLET BY MOUTH TWICE DAILY 3/15/18   Leroy Be MD   cyclobenzaprine (FLEXERIL) 10 mg tablet Take 0.5 Tabs by mouth two (2) times a day. 5/4/17   Yuly Bonilla MD   lisinopril (PRINIVIL, ZESTRIL) 40 mg tablet TAKE 1 TABLET BY MOUTH DAILY 4/19/17   Yuly Bonilla MD   NIFEdipine ER (PROCARDIA XL) 90 mg ER tablet Take 90 mg by mouth daily. Provider, Historical   thiamine (VITAMIN B-1) 100 mg tablet Take  by mouth daily. Provider, Historical   AMINO ACIDS/PROTEIN HYDROLYS (PRO-STAT 64 PO) Take  by mouth. Provider, Historical   mupirocin calcium (BACTROBAN NASAL) 2 % nasal ointment by Both Nostrils route two (2) times a day. Indications: as  needed    Provider, Historical   b complex-vitamin c-folic acid (NEPHROCAPS) 1 mg capsule Take 1 Cap by mouth daily.  [Request refills from PCP (Dr. Iona Priest)] 3/31/17   Yuly Bonilla MD   carvedilol (COREG) 25 mg tablet TAKE 1 TABLET BY MOUTH TWICE DAILY WITH MEALS 3/31/17   Yuly Bonilla MD levETIRAcetam (KEPPRA) 250 mg tablet TAKE 1 TABLET BY MOUTH EVERY Monday, Wednesday, Friday AFTER [facility] DIALYSIS 12/8/16   Link Miller MD   levETIRAcetam (KEPPRA) 500 mg tablet TAKE 1 TABLET BY MOUTH DAILY 12/8/16   Link Miller MD   acetaminophen (TYLENOL) 500 mg tablet Take  by mouth every six (6) hours as needed for Pain. Provider, Historical   simethicone (PHAZYME) 180 mg cap Take  by mouth. Provider, Historical   senna-docusate (PERICOLACE) 8.6-50 mg per tablet Take 1 Tab by mouth daily. 3/1/16   Dorie Ramirez MD   pantoprazole (PROTONIX) 40 mg tablet Take 1 Tab by mouth Daily (before breakfast). [Request refills from PCP (Dr. Wen Priest)] 5/8/13   Ratna Reyes MD   cholecalciferol (VITAMIN D3) 1,000 unit tablet Take 2 Tabs by mouth two (2) times a day. [Request refills from PCP (Dr. Wen Priest)] 5/8/13   Ratna Reyes MD       Allergies: Allergies   Allergen Reactions    Amlodipine Swelling    Nuts [Tree Nut] Swelling    Pcn [Penicillins] Unknown (comments)    Phenothiazines Other (comments)     Per father, \"He has a parkinsonian reaction to this medication. \"       Comorbid Conditions:    Past Medical History:   Diagnosis Date    Anemia in chronic kidney disease(285.21) 4/24/2013    Cardiomyopathy due to hypertension ([address] 4/24/2013    EF 20%    Edema of lower extremity 4/24/2013    End stage renal disease ([address] 04/19/2013    HD since 9/2003    Hyperphosphatemia 4/24/2013    Hypertension     Hypertension, uncontrolled 4/24/2013    Personal history of atrial flutter 4/24/2013    Pulmonary hypertension ([address] 4/24/2013    Recommendation refused by patient 4/24/2013    Patient refuses dialysis (hemodialysis or peritoneal dialysis)    Secondary hyperparathyroidism of renal origin ([address] 4/24/2013    Stroke ([address]     x2 1/2015    Vitamin D insufficiency 4/25/2013    Vitamin D 25-Hydroxy 20.7           Past Surgical History:   Procedure Laterality Date  HX CSF SHUNT  09/2016    HX HEART CATHETERIZATION  01/2015     Data:    There were no vitals taken for this visit.:  Recent Labs     07/18/19  0915        Recent Labs     07/18/19  0915   INR 1.3*   APTT 34.9       The H & P and/or progress notes and any available imaging were reviewed. The risks, indications and possible alternatives to the procedure, including doing nothing, were discussed and informed consent was obtained. Physical Exam:      Mental status:   Alert and oriented. Heart:   Regular rate. Lungs:  Normal respiratory effort. The patient is an appropriate candidate to undergo the planned procedure.      Lorin Fonseca, [address]

## 2019-07-18 NOTE — PROCEDURES
RADIOLOGY POST PARACENTESIS NOTE     July 18, 2019       1:36 PM     Preoperative Diagnosis:   Ascites    Postoperative Diagnosis:  Same. :  Arsen Lau PA-C    Assistant:  None. Type of Anesthesia: 1% plain lidocaine    Procedure/Description:  US-Guided paracentesis    Findings:  Using ultrasound guidance the largest pocket of peritoneal fluid was localized and marked at the left lower quadrant. The patient was prepped and draped in the usual fashion. 1% Lidocaine was infiltrated locally. A 5 Czech over the needle catheter was advanced into the peritoneal cavity and clear yellow colored fluid was aspirated. Once fluid was easily aspirated, the needle was removed leaving the catheter in place. The catheter was connected to vacuum containers and 1.75 liters of ascitic fluid was removed.     Estimated blood Loss:  Minimal    Specimen Removed:   No    Complications: None    Condition: Stable    Discharge Plan:  discharge home     Jessica Mcgill

## 2019-08-01 NOTE — ANESTHESIA POSTPROCEDURE EVALUATION
Procedure(s): UPPER ENDOSCOPY. MAC Anesthesia Post Evaluation Multimodal analgesia: multimodal analgesia used between 6 hours prior to anesthesia start to PACU discharge Patient location during evaluation: bedside Patient participation: complete - patient participated Level of consciousness: awake Pain management: adequate Airway patency: patent Anesthetic complications: no 
Cardiovascular status: stable Respiratory status: acceptable Hydration status: acceptable Post anesthesia nausea and vomiting:  controlled No vitals data found for the desired time range.

## 2019-08-01 NOTE — H&P
[address] [facility], [address] History and Physical reviewed; I have examined the patient and there are no pertinent changes. Jc Bahena MD, MD  
10:05 AM 8/1/2019 [facility], [facility] 
[email or web address]

## 2019-08-01 NOTE — ANESTHESIA PREPROCEDURE EVALUATION
Relevant Problems No relevant active problems Anesthetic History No history of anesthetic complications Review of Systems / Medical History Patient summary reviewed and pertinent labs reviewed Pulmonary Sleep apnea: No treatment Neuro/Psych  
 
seizures: well controlled CVA: no residual symptoms Cardiovascular Hypertension: poorly controlled Exercise tolerance: <4 METS 
  
GI/Hepatic/Renal 
  
 
 
Renal disease (Last HD- 3 days ago): ESRD and dialysis Endo/Other Anemia Other Findings Physical Exam 
 
Airway Mallampati: II 
TM Distance: 4 - 6 cm Neck ROM: normal range of motion Mouth opening: Normal 
 
 Cardiovascular Regular rate and rhythm,  S1 and S2 normal,  no murmur, click, rub, or gallop Dental 
No notable dental hx Pulmonary Breath sounds clear to auscultation Abdominal 
GI exam deferred Other Findings Anesthetic Plan ASA: 4 Anesthesia type: MAC Induction: Intravenous Anesthetic plan and risks discussed with: Patient

## 2019-08-08 NOTE — PROCEDURES
RADIOLOGY POST PARACENTESIS NOTE     August 8, 2019       11:52 AM     Preoperative Diagnosis:   Ascites    Postoperative Diagnosis:  Same. :  Noe Belle PA-C    Assistant:  None. Type of Anesthesia: 1% plain lidocaine    Procedure/Description:  US-Guided paracentesis    Findings:  Using ultrasound guidance the largest pocket of peritoneal fluid was localized and marked at the right lower quadrant. The patient was prepped and draped in the usual fashion. 1% Lidocaine was infiltrated locally. A 5 Gambian over the needle catheter was advanced into the peritoneal cavity and clear yellow colored fluid was aspirated. Once fluid was easily aspirated, the needle was removed leaving the catheter in place. The catheter was connected to vacuum containers and 1.65 liters of ascitic fluid was removed.     Estimated blood Loss:  Minimal    Specimen Removed:   No    Complications: None    Condition: Stable    Discharge Plan:  continue present therapy    GILBERTO Pollack

## 2019-08-08 NOTE — H&P
OUTPATIENT HISTORY AND PHYSICAL      Today 8/8/2019     Indication/Symptoms:   Delia Medel is a 51 y.o. male with recurrent ascites who presents for an US-guided paracentesis. Current Meds:    Prior to Admission medications    Medication Sig Start Date End Date Taking? Authorizing Provider   furosemide (LASIX) 80 mg tablet Take 80 mg by mouth daily. Provider, Historical   isosorbide mononitrate ER (IMDUR) 120 mg CR tablet Take 1 Tab by mouth every morning. 1 tab by mouth daily  Patient taking differently: Take 90 mg by mouth every morning. 1 tab by mouth daily 7/5/19   Aubrey Cuenca NP   doxazosin (CARDURA) 2 mg tablet TAKE ONE TABLET BY MOUTH NIGHTLY 6/5/19   Alfonso Ybarra MD   hydrALAZINE (APRESOLINE) 100 mg tablet TAKE 1 TABLET BY MOUTH THREE TIMES DAILY. 6/5/19   Alfonso Ybarra MD   cloNIDine HCl (CATAPRES) 0.3 mg tablet Take 1 Tab by mouth three (3) times daily. 3/18/19   Adalberto Cuenca NP   sucroferric oxyhydroxide (VELPHORO PO) Take  by mouth Before breakfast, lunch, and dinner. Provider, Historical   cinacalcet (SENSIPAR) 60 mg tab Take  by mouth every Monday, Thursday, Saturday. Injection during dialysis    Provider, Historical   allopurinol (ZYLOPRIM) 100 mg tablet TAKE 1 TABLET BY MOUTH TWICE DAILY 3/15/18   Sharad Hall MD   lisinopril (PRINIVIL, ZESTRIL) 40 mg tablet TAKE 1 TABLET BY MOUTH DAILY 4/19/17   Hannah Rodriguez MD   NIFEdipine ER (PROCARDIA XL) 90 mg ER tablet Take 90 mg by mouth daily. Provider, Historical   thiamine (VITAMIN B-1) 100 mg tablet Take  by mouth daily. Provider, Historical   AMINO ACIDS/PROTEIN HYDROLYS (PRO-STAT 64 PO) Take  by mouth daily. Provider, Historical   mupirocin calcium (BACTROBAN NASAL) 2 % nasal ointment by Both Nostrils route daily as needed. Indications: as  needed    Provider, Historical   b complex-vitamin c-folic acid (NEPHROCAPS) 1 mg capsule Take 1 Cap by mouth daily.  [Request refills from PCP (Dr. Pimentel Police Zayda)] 3/31/17   Gold Martinez MD   carvedilol (COREG) 25 mg tablet TAKE 1 TABLET BY MOUTH TWICE DAILY WITH MEALS 3/31/17   Gold Martinez MD   levETIRAcetam (KEPPRA) 250 mg tablet TAKE 1 TABLET BY MOUTH EVERY Monday, Wednesday, Friday AFTER [facility] DIALYSIS 12/8/16   Lidia Pettit MD   levETIRAcetam (KEPPRA) 500 mg tablet TAKE 1 TABLET BY MOUTH DAILY 12/8/16   Lidia Pettit MD   acetaminophen (TYLENOL) 500 mg tablet Take  by mouth every six (6) hours as needed for Pain. Provider, Historical   simethicone (PHAZYME) 180 mg cap Take  by mouth daily as needed. Provider, Historical   pantoprazole (PROTONIX) 40 mg tablet Take 1 Tab by mouth Daily (before breakfast). [Request refills from PCP (Dr. Nelsy Priest)] 5/8/13   Tabitha Meza MD   cholecalciferol (VITAMIN D3) 1,000 unit tablet Take 2 Tabs by mouth two (2) times a day. [Request refills from PCP (Dr. Nelsy Priest)] 5/8/13   Tabitha Meza MD       Allergies: Allergies   Allergen Reactions    Amlodipine Swelling    Nuts [Tree Nut] Swelling    Pcn [Penicillins] Unknown (comments)     Unsure of reaction    Phenothiazines Other (comments)     Per father, \"He has a parkinsonian reaction to this medication. \"       Comorbid Conditions:    Past Medical History:   Diagnosis Date    Anemia in chronic kidney disease(285.21) 4/24/2013    Ascites     Cardiomyopathy due to hypertension ([address] 4/24/2013    EF 20%    Chronic kidney disease     dialysis MWF at [facility] on [facility].  [phone]    Edema of lower extremity 4/24/2013    End stage renal disease ([address] 04/19/2013    HD since 9/2003    Hyperphosphatemia 4/24/2013    Hypertension     Hypertension, uncontrolled 4/24/2013    Personal history of atrial flutter 4/24/2013    Pulmonary hypertension ([address] 4/24/2013    Recommendation refused by patient 4/24/2013    Patient refuses dialysis (hemodialysis or peritoneal dialysis)    Secondary hyperparathyroidism of renal origin ([facility] 4/24/2013    Seizures ([facility]     on Keppra, last seizure 1/2015    Sleep apnea     no cpap    Stroke [facility])     TIA 2015    Vitamin D insufficiency 4/25/2013    Vitamin D 25-Hydroxy 20.7           Past Surgical History:   Procedure Laterality Date    HX CSF SHUNT  09/2016    HX HEART CATHETERIZATION  01/2015     Data:    There were no vitals taken for this visit.:  No results for input(s): PLT, PLTEXT in the last 72 hours. No lab exists for component:  HCT  No results for input(s): INR, APTT in the last 72 hours. No lab exists for component: PT, INREXT    The H & P and/or progress notes and any available imaging were reviewed. The risks, indications and possible alternatives to the procedure, including doing nothing, were discussed and informed consent was obtained. Physical Exam:      Mental status:   Alert and oriented. Heart:   Regular rate. Lungs:  Normal respiratory effort. The patient is an appropriate candidate to undergo the planned procedure.      Jessica Mary

## 2019-10-10 NOTE — H&P
OUTPATIENT HISTORY AND PHYSICAL      Today 10/10/2019     Indication/Symptoms:   Ewa Sosa is a 47 y.o. male with recurrent ascites who presents for an US-guided paracentesis. Current Meds:    Prior to Admission medications    Medication Sig Start Date End Date Taking? Authorizing Provider   hydrALAZINE (APRESOLINE) 100 mg tablet TAKE 1 TABLET BY MOUTH THREE TIMES DAILY. 9/3/19   Davis Maxwell MD   furosemide (LASIX) 80 mg tablet Take 80 mg by mouth daily. Provider, Historical   isosorbide mononitrate ER (IMDUR) 120 mg CR tablet Take 1 Tab by mouth every morning. 1 tab by mouth daily  Patient taking differently: Take 90 mg by mouth every morning. 1 tab by mouth daily 7/5/19   Mimi Cuenca NP   doxazosin (CARDURA) 2 mg tablet TAKE ONE TABLET BY MOUTH NIGHTLY 6/5/19   Davis Maxwell MD   cloNIDine HCl (CATAPRES) 0.3 mg tablet Take 1 Tab by mouth three (3) times daily. 3/18/19   Adalberto Cuenca NP   sucroferric oxyhydroxide (VELPHORO PO) Take  by mouth Before breakfast, lunch, and dinner. Provider, Historical   cinacalcet (SENSIPAR) 60 mg tab Take  by mouth every Monday, Thursday, Saturday. Injection during dialysis    Provider, Historical   allopurinol (ZYLOPRIM) 100 mg tablet TAKE 1 TABLET BY MOUTH TWICE DAILY 3/15/18   Rey Castorena MD   lisinopril (PRINIVIL, ZESTRIL) 40 mg tablet TAKE 1 TABLET BY MOUTH DAILY 4/19/17   Lucía De La Torre MD   NIFEdipine ER (PROCARDIA XL) 90 mg ER tablet Take 90 mg by mouth daily. Provider, Historical   thiamine (VITAMIN B-1) 100 mg tablet Take  by mouth daily. Provider, Historical   AMINO ACIDS/PROTEIN HYDROLYS (PRO-STAT 64 PO) Take  by mouth daily. Provider, Historical   mupirocin calcium (BACTROBAN NASAL) 2 % nasal ointment by Both Nostrils route daily as needed. Indications: as  needed    Provider, Historical   b complex-vitamin c-folic acid (NEPHROCAPS) 1 mg capsule Take 1 Cap by mouth daily.  [Request refills from PCP ( Deshaun Priest)] 3/31/17   Vitaliy Shen MD   carvedilol (COREG) 25 mg tablet TAKE 1 TABLET BY MOUTH TWICE DAILY WITH MEALS 3/31/17   Vitaliy Shen MD   levETIRAcetam (KEPPRA) 250 mg tablet TAKE 1 TABLET BY MOUTH EVERY Monday, Wednesday, Friday AFTER [facility] DIALYSIS 12/8/16   Noel Pollock MD   levETIRAcetam (KEPPRA) 500 mg tablet TAKE 1 TABLET BY MOUTH DAILY 12/8/16   Noel Pollock MD   acetaminophen (TYLENOL) 500 mg tablet Take  by mouth every six (6) hours as needed for Pain. Provider, Historical   simethicone (PHAZYME) 180 mg cap Take  by mouth daily as needed. Provider, Historical   pantoprazole (PROTONIX) 40 mg tablet Take 1 Tab by mouth Daily (before breakfast). [Request refills from PCP (Dr. Deshaun Priest)] 5/8/13   Susan Angulo MD   cholecalciferol (VITAMIN D3) 1,000 unit tablet Take 2 Tabs by mouth two (2) times a day. [Request refills from PCP (Dr. Deshaun Priest)] 5/8/13   Susan Angulo MD       Allergies: Allergies   Allergen Reactions    Amlodipine Swelling    Nuts [Tree Nut] Swelling    Pcn [Penicillins] Unknown (comments)     Unsure of reaction    Phenothiazines Other (comments)     Per father, \"He has a parkinsonian reaction to this medication. \"       Comorbid Conditions:    Past Medical History:   Diagnosis Date    Anemia in chronic kidney disease(285.21) 4/24/2013    Ascites     Cardiomyopathy due to hypertension ([address] 4/24/2013    EF 20%    Chronic kidney disease     dialysis MWF at [facility] on [address]  [phone]    Edema of lower extremity 4/24/2013    End stage renal disease ([address] 04/19/2013    HD since 9/2003    Hyperphosphatemia 4/24/2013    Hypertension     Hypertension, uncontrolled 4/24/2013    Personal history of atrial flutter 4/24/2013    Pulmonary hypertension ([address] 4/24/2013    Recommendation refused by patient 4/24/2013    Patient refuses dialysis (hemodialysis or peritoneal dialysis)    Secondary hyperparathyroidism of renal origin ([facility] 4/24/2013    Seizures ([facility]     on Keppra, last seizure 1/2015    Sleep apnea     no cpap    Stroke [facility])     TIA 2015    Vitamin D insufficiency 4/25/2013    Vitamin D 25-Hydroxy 20.7           Past Surgical History:   Procedure Laterality Date    HX CSF SHUNT  09/2016    HX HEART CATHETERIZATION  01/2015     Data:    There were no vitals taken for this visit.:  Recent Labs     10/10/19  0730        Recent Labs     10/10/19  0730   INR 1.3*   APTT 34.6       The H & P and/or progress notes and any available imaging were reviewed. The risks, indications and possible alternatives to the procedure, including doing nothing, were discussed and informed consent was obtained. Physical Exam:      Mental status:   Alert and oriented. Heart:   Regular rate. Lungs:  Normal respiratory effort. The patient is an appropriate candidate to undergo the planned procedure.      Jessica Angulo

## 2019-11-01 NOTE — PROCEDURES
RADIOLOGY POST PARACENTESIS NOTE     November 1, 2019       9:24 AM     Preoperative Diagnosis:   Ascites    Postoperative Diagnosis:  Same. :  Pepe Jean PA-C    Assistant:  None. Type of Anesthesia: 1% plain lidocaine    Procedure/Description:  US-Guided paracentesis    Findings:  Using ultrasound guidance the largest pocket of peritoneal fluid was localized and marked at the right lower quadrant. The patient was prepped and draped in the usual fashion. 1% Lidocaine was infiltrated locally. A 5 Khmer over the needle catheter was advanced into the peritoneal cavity and clear yellow colored fluid was aspirated. Once fluid was easily aspirated, the needle was removed leaving the catheter in place. The catheter was connected to vacuum containers and 1.5 liters of ascitic fluid was removed.     Estimated blood Loss:  Minimal    Specimen Removed:   No    Complications: None    Condition: Stable    Discharge Plan:  discharge home     Jessica Linares

## 2019-11-01 NOTE — H&P
OUTPATIENT HISTORY AND PHYSICAL      Today 11/1/2019     Indication/Symptoms:   Sridevi Bravo is a 47 y.o. male with recurrent ascites who presents for an US-guided paracentesis. Current Meds:    Prior to Admission medications    Medication Sig Start Date End Date Taking? Authorizing Provider   carvedilol (COREG) 25 mg tablet Take 1 Tab by mouth two (2) times daily (with meals). 10/22/19   Adalberto Cuenca NP   hydrALAZINE (APRESOLINE) 100 mg tablet TAKE 1 TABLET BY MOUTH THREE TIMES DAILY. 9/3/19   Jocelyne Ruth MD   furosemide (LASIX) 80 mg tablet Take 80 mg by mouth daily. Provider, Historical   isosorbide mononitrate ER (IMDUR) 120 mg CR tablet Take 1 Tab by mouth every morning. 1 tab by mouth daily  Patient taking differently: Take 90 mg by mouth every morning. 1 tab by mouth daily 7/5/19   Lana Cuenca NP   doxazosin (CARDURA) 2 mg tablet TAKE ONE TABLET BY MOUTH NIGHTLY 6/5/19   Jocelyne Ruth MD   cloNIDine HCl (CATAPRES) 0.3 mg tablet Take 1 Tab by mouth three (3) times daily. 3/18/19   Adalberto Cuenca NP   sucroferric oxyhydroxide (VELPHORO PO) Take  by mouth Before breakfast, lunch, and dinner. Provider, Historical   cinacalcet (SENSIPAR) 60 mg tab Take  by mouth every Monday, Thursday, Saturday. Injection during dialysis    Provider, Historical   allopurinol (ZYLOPRIM) 100 mg tablet TAKE 1 TABLET BY MOUTH TWICE DAILY 3/15/18   Quirino Montoya MD   lisinopril (PRINIVIL, ZESTRIL) 40 mg tablet TAKE 1 TABLET BY MOUTH DAILY 4/19/17   Fernando Demarco MD   NIFEdipine ER (PROCARDIA XL) 90 mg ER tablet Take 90 mg by mouth daily. Provider, Historical   thiamine (VITAMIN B-1) 100 mg tablet Take  by mouth daily. Provider, Historical   AMINO ACIDS/PROTEIN HYDROLYS (PRO-STAT 64 PO) Take  by mouth daily. Provider, Historical   mupirocin calcium (BACTROBAN NASAL) 2 % nasal ointment by Both Nostrils route daily as needed.  Indications: as  needed    Provider, Historical   b complex-vitamin c-folic acid (NEPHROCAPS) 1 mg capsule Take 1 Cap by mouth daily. [Request refills from PCP (Dr. Chantell Priest)] 3/31/17   Nona Hennessy MD   levETIRAcetam (KEPPRA) 250 mg tablet TAKE 1 TABLET BY MOUTH EVERY Monday, Wednesday, Friday AFTER [facility] DIALYSIS 12/8/16   Mar Rankin MD   levETIRAcetam (KEPPRA) 500 mg tablet TAKE 1 TABLET BY MOUTH DAILY 12/8/16   Mar Rankin MD   acetaminophen (TYLENOL) 500 mg tablet Take  by mouth every six (6) hours as needed for Pain. Provider, Historical   simethicone (PHAZYME) 180 mg cap Take  by mouth daily as needed. Provider, Historical   pantoprazole (PROTONIX) 40 mg tablet Take 1 Tab by mouth Daily (before breakfast). [Request refills from PCP (Dr. Chantell Priest)] 5/8/13   Clemente Dey MD   cholecalciferol (VITAMIN D3) 1,000 unit tablet Take 2 Tabs by mouth two (2) times a day. [Request refills from PCP (Dr. Chantell Priest)] 5/8/13   Clemente Dey MD       Allergies: Allergies   Allergen Reactions    Amlodipine Swelling    Nuts [Tree Nut] Swelling    Pcn [Penicillins] Unknown (comments)     Unsure of reaction    Phenothiazines Other (comments)     Per father, \"He has a parkinsonian reaction to this medication. \"       Comorbid Conditions:    Past Medical History:   Diagnosis Date    Anemia in chronic kidney disease(285.21) 4/24/2013    Ascites     Cardiomyopathy due to hypertension ([address] 4/24/2013    EF 20%    Chronic kidney disease     dialysis MWF at [facility] on [address]  [phone]    Edema of lower extremity 4/24/2013    End stage renal disease ([address] 04/19/2013    HD since 9/2003    Hyperphosphatemia 4/24/2013    Hypertension     Hypertension, uncontrolled 4/24/2013    Personal history of atrial flutter 4/24/2013    Pulmonary hypertension ([address] 4/24/2013    Recommendation refused by patient 4/24/2013    Patient refuses dialysis (hemodialysis or peritoneal dialysis)    Secondary hyperparathyroidism of renal origin ([facility] 4/24/2013    Seizures ([facility]     on Keppra, last seizure 1/2015    Sleep apnea     no cpap    Stroke [facility])     TIA 2015    Vitamin D insufficiency 4/25/2013    Vitamin D 25-Hydroxy 20.7           Past Surgical History:   Procedure Laterality Date    HX CSF SHUNT  09/2016    HX HEART CATHETERIZATION  01/2015     Data:    There were no vitals taken for this visit.:  No results for input(s): PLT, PLTEXT in the last 72 hours. No lab exists for component:  HCT  No results for input(s): INR, APTT, INREXT in the last 72 hours. No lab exists for component: PT    The H & P and/or progress notes and any available imaging were reviewed. The risks, indications and possible alternatives to the procedure, including doing nothing, were discussed and informed consent was obtained. Physical Exam:      Mental status:   Alert and oriented. Heart:   Regular rate. Lungs:  Normal respiratory effort. The patient is an appropriate candidate to undergo the planned procedure.      Jessica Garrido

## 2019-11-21 NOTE — PROGRESS NOTES
Patient didn't bring medications, verbally reviewed 1. Have you been to the ER, urgent care clinic since your last visit? Hospitalized since your last visit? No 
 
2. Have you seen or consulted any other health care providers outside of the [address] [place] [address] since your last visit? Include any pap smears or colon screening. No  
 
3. Since your last visit, have you had any of the following symptoms?  
 
 palpitations, shortness of breath and swelling in legs/arms. 4.  Have you had any blood work, X-rays or cardiac testing? Yes Where: [facility] Requested: NO In [facility]: YES 
 
5. Where do you normally have your labs drawn? [facility] 6. Do you need any refills today?    No

## 2019-11-21 NOTE — PATIENT INSTRUCTIONS
Medications Discontinued During This Encounter Medication Reason  AMINO ACIDS/PROTEIN HYDROLYS (PRO-STAT 64 PO) Patient education included a review of the importance of compliance in the treatment regimen Avoiding Triggers With Heart Failure: Care Instructions Your Care Instructions Triggers are anything that make your heart failure flare up. A flare-up is also called \"sudden heart failure\" or \"acute heart failure. \" When you have a flare-up, fluid builds up in your lungs, and you have problems breathing. You might need to go to the hospital. By watching for changes in your condition and avoiding triggers, you can prevent heart failure flare-ups. Follow-up care is a key part of your treatment and safety. Be sure to make and go to all appointments, and call your doctor if you are having problems. It's also a good idea to know your test results and keep a list of the medicines you take. How can you care for yourself at home? Watch for changes in your weight and condition · Weigh yourself without clothing at the same time each day. Record your weight. Call your doctor if you have sudden weight gain, such as more than 2 to 3 pounds in a day or 5 pounds in a week. (Your doctor may suggest a different range of weight gain.) A sudden weight gain may mean that your heart failure is getting worse. · Keep a daily record of your symptoms. Write down any changes in how you feel, such as new shortness of breath, cough, or problems eating. Also record if your ankles are more swollen than usual and if you feel more tired than usual. Note anything that you ate or did that could have triggered these changes. Limit sodium Sodium causes your body to hold on to extra water. This may cause your heart failure symptoms to get worse. People get most of their sodium from processed foods. Fast food and restaurant meals also tend to be very high in sodium. · Your doctor may suggest that you limit sodium to 2,000 milligrams (mg) a day or less. That is less than 1 teaspoon of salt a day, including all the salt you eat in cooking or in packaged foods. · Read food labels on cans and food packages. They tell you how much sodium you get in one serving. Check the serving size. If you eat more than one serving, you are getting more sodium. · Be aware that sodium can come in forms other than salt, including monosodium glutamate (MSG), sodium citrate, and sodium bicarbonate (baking soda). MSG is often added to Asian food. You can sometimes ask for food without MSG or salt. · Slowly reducing salt will help you adjust to the taste. Take the salt shaker off the table. · Flavor your food with garlic, lemon juice, onion, vinegar, herbs, and spices instead of salt. Do not use soy sauce, steak sauce, onion salt, garlic salt, mustard, or ketchup on your food, unless it is labeled \"low-sodium\" or \"low-salt. \" 
· Make your own salad dressings, sauces, and ketchup without adding salt. · Use fresh or frozen ingredients, instead of canned ones, whenever you can. Choose low-sodium canned goods. · Eat less processed food and food from restaurants, including fast food. Exercise as directed Moderate, regular exercise is very good for your heart. It improves your blood flow and helps control your weight. But too much exercise can stress your heart and cause a heart failure flare-up. · Check with your doctor before you start an exercise program. 
· Walking is an easy way to get exercise. Start out slowly. Gradually increase the length and pace of your walk. Swimming, riding a bike, and using a treadmill are also good forms of exercise. · When you exercise, watch for signs that your heart is working too hard. You are pushing yourself too hard if you cannot talk while you are exercising.  If you become short of breath or dizzy or have chest pain, stop, sit down, and rest. 
 · Do not exercise when you do not feel well. Take medicines correctly · Take your medicines exactly as prescribed. Call your doctor if you think you are having a problem with your medicine. · Make a list of all the medicines you take. Include those prescribed to you by other doctors and any over-the-counter medicines, vitamins, or supplements you take. Take this list with you when you go to any doctor. · Take your medicines at the same time every day. It may help you to post a list of all the medicines you take every day and what time of day you take them. · Make taking your medicine as simple as you can. Plan times to take your medicines when you are doing other things, such as eating a meal or getting ready for bed. This will make it easier to remember to take your medicines. · Get organized. Use helpful tools, such as daily or weekly pill containers. When should you call for help? Call 911 if you have symptoms of sudden heart failure such as: 
  · You have severe trouble breathing.  
  · You cough up pink, foamy mucus.  
  · You have a new irregular or rapid heartbeat.  
 Call your doctor now or seek immediate medical care if: 
  · You have new or increased shortness of breath.  
  · You are dizzy or lightheaded, or you feel like you may faint.  
  · You have sudden weight gain, such as more than 2 to 3 pounds in a day or 5 pounds in a week. (Your doctor may suggest a different range of weight gain.)  
  · You have increased swelling in your legs, ankles, or feet.  
  · You are suddenly so tired or weak that you cannot do your usual activities.  
 Watch closely for changes in your health, and be sure to contact your doctor if you develop new symptoms. Where can you learn more? Go to http://randa-pavel.info/. Enter J776 in the search box to learn more about \"Avoiding Triggers With Heart Failure: Care Instructions. \" Current as of: April 9, 2019 Content Version: 12.2 © 0500-1632 [employer]. Care instructions adapted under license by [employer] (which disclaims liability or warranty for this information). If you have questions about a medical condition or this instruction, always ask your healthcare professional. [address] any warranty or liability for your use of this information.

## 2019-11-21 NOTE — PROGRESS NOTES
HISTORY OF PRESENT ILLNESS Noel David is a 47 y.o. male. Cardiomyopathy The history is provided by the medical records (3/18 cath not done yet). Associated symptoms include shortness of breath. Pertinent negatives include no chest pain and no headaches. Hypertension The history is provided by the medical records. This is a chronic problem. Associated symptoms include shortness of breath. Pertinent negatives include no chest pain and no headaches. CHF The history is provided by the medical records. This is a chronic problem. Associated symptoms include shortness of breath. Pertinent negatives include no chest pain and no headaches. Shortness of Breath The history is provided by the patient. This is a chronic problem. The problem occurs intermittently. The current episode started more than 1 week ago. The problem has not changed since onset. Associated symptoms include leg swelling. Pertinent negatives include no fever, no headaches, no cough, no wheezing, no PND, no orthopnea, no chest pain, no vomiting, no rash and no claudication. The problem's precipitants include exercise (walking in the house; 2/19 200ft). Leg Swelling The history is provided by the patient. This is a chronic problem. The current episode started more than 1 week ago. The problem occurs every several days. The problem has not changed since onset. Associated symptoms include shortness of breath. Pertinent negatives include no chest pain and no headaches. The symptoms are aggravated by standing. The symptoms are relieved by sleep (HD). Review of Systems Constitutional: Negative for chills, fever, malaise/fatigue and weight loss. HENT: Negative for nosebleeds. Eyes: Negative for discharge. Respiratory: Positive for shortness of breath. Negative for cough and wheezing. Cardiovascular: Positive for leg swelling. Negative for chest pain, palpitations, orthopnea, claudication and PND. Gastrointestinal: Negative for diarrhea, nausea and vomiting. Genitourinary: Negative for dysuria and hematuria. Musculoskeletal: Negative for joint pain. Skin: Negative for rash. Neurological: Negative for dizziness, seizures, loss of consciousness and headaches. Endo/Heme/Allergies: Negative for polydipsia. Does not bruise/bleed easily. Psychiatric/Behavioral: Negative for depression and substance abuse. The patient does not have insomnia. Allergies Allergen Reactions  Amlodipine Swelling  Nuts [Tree Nut] Swelling  Pcn [Penicillins] Unknown (comments) Unsure of reaction  Phenothiazines Other (comments) Per father, Gavino Noguera has a parkinsonian reaction to this medication. \"  
 
 
Past Medical History:  
Diagnosis Date  Anemia in chronic kidney disease(285.21) 4/24/2013  Ascites  Cardiomyopathy due to hypertension ([address] 4/24/2013 EF 20%  Chronic kidney disease   
 dialysis MWF at [facility] on [address] [phone]  
 Edema of lower extremity 4/24/2013  End stage renal disease ([address] 04/19/2013 HD since 9/2003  Hyperphosphatemia 4/24/2013  Hypertension  Hypertension, uncontrolled 4/24/2013  Personal history of atrial flutter 4/24/2013  Pulmonary hypertension ([address] 4/24/2013  Recommendation refused by patient 4/24/2013 Patient refuses dialysis (hemodialysis or peritoneal dialysis)  Secondary hyperparathyroidism of renal origin ([address] 4/24/2013  Seizures ([address]   
 on Keppra, last seizure 1/2015  Sleep apnea   
 no cpap  Stroke ([address] TIA 2015  Vitamin D insufficiency 4/25/2013 Vitamin D 25-Hydroxy 20.7 Family History Problem Relation Age of Onset  Hypertension Mother  Hypertension Father  Heart Attack Neg Hx  Stroke Neg Hx Social History Tobacco Use  Smoking status: Never Smoker  Smokeless tobacco: Never Used Substance Use Topics  Alcohol use: No  
 Drug use:  No  
  
 
 Current Outpatient Medications Medication Sig  carvedilol (COREG) 25 mg tablet Take 1 Tab by mouth two (2) times daily (with meals).  hydrALAZINE (APRESOLINE) 100 mg tablet TAKE 1 TABLET BY MOUTH THREE TIMES DAILY.  furosemide (LASIX) 80 mg tablet Take 80 mg by mouth daily.  isosorbide mononitrate ER (IMDUR) 120 mg CR tablet Take 1 Tab by mouth every morning. 1 tab by mouth daily (Patient taking differently: Take 90 mg by mouth every morning. 1 tab by mouth daily)  doxazosin (CARDURA) 2 mg tablet TAKE ONE TABLET BY MOUTH NIGHTLY  cloNIDine HCl (CATAPRES) 0.3 mg tablet Take 1 Tab by mouth three (3) times daily.  sucroferric oxyhydroxide (VELPHORO PO) Take  by mouth Before breakfast, lunch, and dinner.  cinacalcet (SENSIPAR) 60 mg tab Take  by mouth every Monday, Thursday, Saturday. Injection during dialysis  allopurinol (ZYLOPRIM) 100 mg tablet TAKE 1 TABLET BY MOUTH TWICE DAILY  lisinopril (PRINIVIL, ZESTRIL) 40 mg tablet TAKE 1 TABLET BY MOUTH DAILY  NIFEdipine ER (PROCARDIA XL) 90 mg ER tablet Take 90 mg by mouth daily.  thiamine (VITAMIN B-1) 100 mg tablet Take  by mouth daily.  mupirocin calcium (BACTROBAN NASAL) 2 % nasal ointment by Both Nostrils route daily as needed. Indications: as  needed  b complex-vitamin c-folic acid (NEPHROCAPS) 1 mg capsule Take 1 Cap by mouth daily. [Request refills from PCP (Dr. Trini Priest)]  levETIRAcetam (KEPPRA) 250 mg tablet TAKE 1 TABLET BY MOUTH EVERY Monday, Wednesday, Friday AFTER EACH DIALYSIS  
 levETIRAcetam (KEPPRA) 500 mg tablet TAKE 1 TABLET BY MOUTH DAILY  acetaminophen (TYLENOL) 500 mg tablet Take  by mouth every six (6) hours as needed for Pain.  simethicone (PHAZYME) 180 mg cap Take  by mouth daily as needed.  pantoprazole (PROTONIX) 40 mg tablet Take 1 Tab by mouth Daily (before breakfast). [Request refills from PCP (Dr. Trini Priest)]  cholecalciferol (VITAMIN D3) 1,000 unit tablet Take 2 Tabs by mouth two (2) times a day. [Request refills from PCP (Dr. Alma Priest)] No current facility-administered medications for this visit. Past Surgical History:  
Procedure Laterality Date  HX CSF SHUNT  09/2016  HX HEART CATHETERIZATION  01/2015 Visit Vitals BP (!) 166/101 Pulse 89 Ht 5' 11\" (1.803 m) Wt 85.7 kg (189 lb) BMI 26.36 kg/m² Diagnostic Studies: 
I have reviewed the relevant tests done on the patient and show as follows EKG tracings reviewed by me today. No flowsheet data found. Mr. Addison Goodman has a reminder for a \"due or due soon\" health maintenance. I have asked that he contact his primary care provider for follow-up on this health maintenance. 4/18 Nuc Stress Conclusion: 1. Normal perfusion scan. 2. Normal wall motion and ejection fraction. 3. Low risk scan. 
03/20/19 ECHO ADULT COMPLETE 03/21/2019 3/21/2019 Narrative · Estimated left ventricular ejection fraction is 56 - 60%. Left  
ventricular severe concentric hypertrophy. Abnormal left ventricular  
septal motion. Diastolic flattening of the interventricular septum  
consistent with right ventricle volume overload and systolic flattening of  
the interventricular septum consistent with right ventricle pressure  
overload. Abnormal left ventricular strain pattern suggestive of  
amyloidosis. Moderate (grade 2) left ventricular diastolic dysfunction. · Left atrial cavity size is severely dilated. · Right ventricular cavity size is moderately dilated. Right ventricular  
global systolic function is mildly reduced. · Right atrial cavity size is moderately dilated. · Mitral valve thickening. Moderate mitral annular calcification. Mild  
mitral valve regurgitation. · Mild aortic valve regurgitation is present. · Moderate tricuspid valve regurgitation. Pulmonary arterial systolic pressure is 85.0 mmHg. Severe pulmonary hypertension. · Mild pulmonic valve regurgitation is present. · Severely elevated central venous pressure (15+ mmHg); IVC diameter is  
larger than 21 mm and collapses less than 50% with respiration. · Trivial pericardial effusion. There is a large left pleural effusion. 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 Signed by: Cristian Holloway MD  
 
Physical Exam  
Constitutional: He is oriented to person, place, and time. He appears well-developed and well-nourished. No distress. HENT:  
Head: Normocephalic and atraumatic. Mouth/Throat: Normal dentition. Eyes: Right eye exhibits no discharge. Left eye exhibits no discharge. No scleral icterus. Neck: Neck supple. JVD present. Carotid bruit is not present. No thyromegaly present. Cardiovascular: Normal rate, regular rhythm, S1 normal, S2 normal, normal heart sounds and intact distal pulses. Exam reveals no gallop and no friction rub. No murmur heard. Pulmonary/Chest: Effort normal. He has no wheezes. He has rales (R>L base). Abdominal: Soft. He exhibits distension. He exhibits no mass. There is no tenderness. +ascites Musculoskeletal:     
   General: Edema (3-4+) present. Lymphadenopathy:  
     Right cervical: No superficial cervical adenopathy present. Left cervical: No superficial cervical adenopathy present. Neurological: He is alert and oriented to person, place, and time. Skin: Skin is warm and dry. No rash noted. Psychiatric: He has a normal mood and affect. His behavior is normal.  
 
 
ASSESSMENT and PLAN Patient education included a review of the importance of compliance in the treatment regimen. Patient did seem to be compliant now with his present blood pressure medicines Patient has been noncompliant with medications and missing dialysis off and on.   Discussed with patient that more fluid needs to be removed and dialysis as he still has significant edema it will help in controlling the blood pressure as well as reducing the need for repeated paracentesis. Once there is no significant edema and ascites is still a problem, TIPS can be considered. Patient will think about TI PS. Advised to go to emergency room if blood pressure is uncontrolled but it always improves once he takes his medications. Repeated the discussion in detail and spent some time explaining the use of blood pressure control. He understands it already but he still misses once or twice a week his dialysis as he gets severely nauseous. Advised him not to miss the dialysis but request the center to remove less fluid if he is significantly nauseous and gradually fluid may go down at his body may tolerate. He also understands poor prognosis including death with continued severe pulmonary hypertension and fluid overload. Diagnoses and all orders for this visit: 
 
1. Uncontrolled hypertension 2. End stage renal disease ([facility] 3. Chronic diastolic congestive heart failure ([facility] 4. Pulmonary hypertension 5. Non compliance w medication regimen 6. Other ascites Pertinent laboratory and test data reviewed and discussed with patient. See patient instructions also for other medical advice given Medications Discontinued During This Encounter Medication Reason  AMINO ACIDS/PROTEIN HYDROLYS (PRO-STAT 64 PO) Follow-up and Dispositions · Return in about 6 months (around 5/21/2020), or if symptoms worsen or fail to improve.

## 2019-11-25 NOTE — H&P
OUTPATIENT HISTORY AND PHYSICAL      Today 11/25/2019     Indication/Symptoms:   Sabi García is a 47 y.o. male with recurrent ascites who presents for an US-guided paracentesis. Current Meds:    Prior to Admission medications    Medication Sig Start Date End Date Taking? Authorizing Provider   carvedilol (COREG) 25 mg tablet Take 1 Tab by mouth two (2) times daily (with meals). 10/22/19   Adalberto Cuenca NP   hydrALAZINE (APRESOLINE) 100 mg tablet TAKE 1 TABLET BY MOUTH THREE TIMES DAILY. 9/3/19   Cathy Schaeffer MD   furosemide (LASIX) 80 mg tablet Take 80 mg by mouth daily. Provider, Historical   isosorbide mononitrate ER (IMDUR) 120 mg CR tablet Take 1 Tab by mouth every morning. 1 tab by mouth daily  Patient taking differently: Take 90 mg by mouth every morning. 1 tab by mouth daily 7/5/19   Lilo Cuenca NP   doxazosin (CARDURA) 2 mg tablet TAKE ONE TABLET BY MOUTH NIGHTLY 6/5/19   Cathy Schaeffer MD   cloNIDine HCl (CATAPRES) 0.3 mg tablet Take 1 Tab by mouth three (3) times daily. 3/18/19   Adalberto Cuenca NP   sucroferric oxyhydroxide (VELPHORO PO) Take  by mouth Before breakfast, lunch, and dinner. Provider, Historical   cinacalcet (SENSIPAR) 60 mg tab Take  by mouth every Monday, Thursday, Saturday. Injection during dialysis    Provider, Historical   allopurinol (ZYLOPRIM) 100 mg tablet TAKE 1 TABLET BY MOUTH TWICE DAILY 3/15/18   Yared Brantley MD   lisinopril (PRINIVIL, ZESTRIL) 40 mg tablet TAKE 1 TABLET BY MOUTH DAILY 4/19/17   Leana Wood MD   NIFEdipine ER (PROCARDIA XL) 90 mg ER tablet Take 90 mg by mouth daily. Provider, Historical   thiamine (VITAMIN B-1) 100 mg tablet Take  by mouth daily. Provider, Historical   mupirocin calcium (BACTROBAN NASAL) 2 % nasal ointment by Both Nostrils route daily as needed. Indications: as  needed    Provider, Historical   b complex-vitamin c-folic acid (NEPHROCAPS) 1 mg capsule Take 1 Cap by mouth daily.  Yessy Gardner refills from PCP (Dr. Adeel Priest)] 3/31/17   Godwin Dutton MD   levETIRAcetam (KEPPRA) 250 mg tablet TAKE 1 TABLET BY MOUTH EVERY Monday, Wednesday, Friday AFTER [facility] DIALYSIS 12/8/16   Berry Taylor MD   levETIRAcetam (KEPPRA) 500 mg tablet TAKE 1 TABLET BY MOUTH DAILY 12/8/16   Berry Taylor MD   acetaminophen (TYLENOL) 500 mg tablet Take  by mouth every six (6) hours as needed for Pain. Provider, Historical   simethicone (PHAZYME) 180 mg cap Take  by mouth daily as needed. Provider, Historical   pantoprazole (PROTONIX) 40 mg tablet Take 1 Tab by mouth Daily (before breakfast). [Request refills from PCP (Dr. Adeel Priest)] 5/8/13   Julieta Dykes MD   cholecalciferol (VITAMIN D3) 1,000 unit tablet Take 2 Tabs by mouth two (2) times a day. [Request refills from PCP (Dr. Adeel Priest)] 5/8/13   Julieta Dykes MD       Allergies: Allergies   Allergen Reactions    Amlodipine Swelling    Nuts [Tree Nut] Swelling    Pcn [Penicillins] Unknown (comments)     Unsure of reaction    Phenothiazines Other (comments)     Per father, \"He has a parkinsonian reaction to this medication. \"       Comorbid Conditions:    Past Medical History:   Diagnosis Date    Anemia in chronic kidney disease(285.21) 4/24/2013    Ascites     Cardiomyopathy due to hypertension ([address] 4/24/2013    EF 20%    Chronic kidney disease     dialysis MWF at [facility] on [address]  [phone]    Edema of lower extremity 4/24/2013    End stage renal disease ([address] 04/19/2013    HD since 9/2003    Hyperphosphatemia 4/24/2013    Hypertension     Hypertension, uncontrolled 4/24/2013    Personal history of atrial flutter 4/24/2013    Pulmonary hypertension ([address] 4/24/2013    Recommendation refused by patient 4/24/2013    Patient refuses dialysis (hemodialysis or peritoneal dialysis)    Secondary hyperparathyroidism of renal origin ([address] 4/24/2013    Seizures ([address]     on Keppra, last seizure 1/2015    Sleep apnea     no cpap    Stroke [facility])     TIA 2015    Vitamin D insufficiency 4/25/2013    Vitamin D 25-Hydroxy 20.7           Past Surgical History:   Procedure Laterality Date    HX CSF SHUNT  09/2016    HX HEART CATHETERIZATION  01/2015     Data:    There were no vitals taken for this visit.:  No results for input(s): PLT, PLTEXT in the last 72 hours. No lab exists for component:  HCT  No results for input(s): INR, APTT, INREXT in the last 72 hours. No lab exists for component: PT    The H & P and/or progress notes and any available imaging were reviewed. The risks, indications and possible alternatives to the procedure, including doing nothing, were discussed and informed consent was obtained. Physical Exam:      Mental status:   Alert and oriented. Heart:   Regular rate. Lungs:  Normal respiratory effort. The patient is an appropriate candidate to undergo the planned procedure.      Jessica Aj

## 2019-12-13 NOTE — ED PROVIDER NOTES
EMERGENCY DEPARTMENT HISTORY AND PHYSICAL EXAM 
 
5:57 PM, short nurses, patient examined in wheelchair in the hallway outside of waiting room. Then returned to waiting room Date: 12/13/2019 Patient Name: Adarsh Churchill History of Presenting Illness Chief Complaint Patient presents with  Hypertension History Provided By: patient Additional History (Context): Adarsh Churchill is a 47 y.o. male presents with history of ESRD on dialysis, had full run of dialysis today and now feels cramps in his hips and hamstrings. This is happened previously when they take too much fluid off. Blood pressure is very high 195, he is compliant with his medications and has a history of difficult to control blood pressure. No shortness of breath chest pain or abdominal pain. Marlene Ask PCP: None Chief Complaint:  
Duration:   
Timing: Location:  
Quality:  
Severity:  
Modifying Factors:  
Associated Symptoms:  
 
 
Current Facility-Administered Medications Medication Dose Route Frequency Provider Last Rate Last Dose  sodium chloride 0.9 % bolus infusion 250 mL  250 mL IntraVENous Sylvain CASTREJON  mL/hr at 12/13/19 2031 250 mL at 12/13/19 2031  sodium chloride 0.9 % bolus infusion 250 mL  250 mL IntraVENous ONCE Mario Escalera MD      
 acetaminophen (TYLENOL) tablet 1,000 mg  1,000 mg Oral ONCE Pawan Garcia MD      
 
Current Outpatient Medications Medication Sig Dispense Refill  carvedilol (COREG) 25 mg tablet Take 1 Tab by mouth two (2) times daily (with meals). 180 Tab 3  
 hydrALAZINE (APRESOLINE) 100 mg tablet TAKE 1 TABLET BY MOUTH THREE TIMES DAILY. 270 Tab 0  
 furosemide (LASIX) 80 mg tablet Take 80 mg by mouth daily.  isosorbide mononitrate ER (IMDUR) 120 mg CR tablet Take 1 Tab by mouth every morning. 1 tab by mouth daily (Patient taking differently: Take 90 mg by mouth every morning.  1 tab by mouth daily) 90 Tab 1  
  doxazosin (CARDURA) 2 mg tablet TAKE ONE TABLET BY MOUTH NIGHTLY 90 Tab 0  cloNIDine HCl (CATAPRES) 0.3 mg tablet Take 1 Tab by mouth three (3) times daily. 270 Tab 1  
 sucroferric oxyhydroxide (VELPHORO PO) Take  by mouth Before breakfast, lunch, and dinner.  cinacalcet (SENSIPAR) 60 mg tab Take  by mouth every Monday, Thursday, Saturday. Injection during dialysis  allopurinol (ZYLOPRIM) 100 mg tablet TAKE 1 TABLET BY MOUTH TWICE DAILY 180 Tab 0  
 lisinopril (PRINIVIL, ZESTRIL) 40 mg tablet TAKE 1 TABLET BY MOUTH DAILY 90 Tab 0  
 NIFEdipine ER (PROCARDIA XL) 90 mg ER tablet Take 90 mg by mouth daily.  thiamine (VITAMIN B-1) 100 mg tablet Take  by mouth daily.  mupirocin calcium (BACTROBAN NASAL) 2 % nasal ointment by Both Nostrils route daily as needed. Indications: as  needed  b complex-vitamin c-folic acid (NEPHROCAPS) 1 mg capsule Take 1 Cap by mouth daily. [Request refills from PCP (Dr. Jonas Priest)] 30 Cap 1  
 levETIRAcetam (KEPPRA) 250 mg tablet TAKE 1 TABLET BY MOUTH EVERY Monday, Wednesday, Friday AFTER EACH DIALYSIS 15 Tab 0  
 levETIRAcetam (KEPPRA) 500 mg tablet TAKE 1 TABLET BY MOUTH DAILY 30 Tab 0  
 acetaminophen (TYLENOL) 500 mg tablet Take  by mouth every six (6) hours as needed for Pain.  simethicone (PHAZYME) 180 mg cap Take  by mouth daily as needed.  pantoprazole (PROTONIX) 40 mg tablet Take 1 Tab by mouth Daily (before breakfast). [Request refills from PCP (Dr. Jonas Priest)] 15 Tab 0  cholecalciferol (VITAMIN D3) 1,000 unit tablet Take 2 Tabs by mouth two (2) times a day. [Request refills from PCP (Dr. Jonas Priest)] 60 Tab 0 Past History Past Medical History: 
Past Medical History:  
Diagnosis Date  Anemia in chronic kidney disease(285.21) 4/24/2013  Ascites  Cardiomyopathy due to hypertension ([facility] 4/24/2013 EF 20%  Chronic kidney disease   
 dialysis MWF at [facility] on [address] [phone]  Edema of lower extremity 4/24/2013  End stage renal disease ([facility] [address] 04/19/2013 HD since 9/2003  Hyperphosphatemia 4/24/2013  Hypertension  Hypertension, uncontrolled 4/24/2013  Personal history of atrial flutter 4/24/2013  Pulmonary hypertension ([facility] 4/24/2013  Recommendation refused by patient 4/24/2013 Patient refuses dialysis (hemodialysis or peritoneal dialysis)  Secondary hyperparathyroidism of renal origin ([facility] 4/24/2013  [facility]   
 on Keppra, last seizure 1/2015  Sleep apnea   
 no cpap  [facility]IA 2015  Vitamin D insufficiency 4/25/2013 Vitamin D 25-Hydroxy 20.7 Past Surgical History: 
Past Surgical History:  
Procedure Laterality Date  HX CSF SHUNT  09/2016  HX HEART CATHETERIZATION  01/2015 Family History: 
Family History Problem Relation Age of Onset  Hypertension Mother  Hypertension Father  Heart Attack Neg Hx  Stroke Neg Hx Social History: 
Social History Tobacco Use  Smoking status: Never Smoker  Smokeless tobacco: Never Used Substance Use Topics  Alcohol use: No  
 Drug use: No  
 
 
Allergies: Allergies Allergen Reactions  Amlodipine Swelling  Nuts [Tree Nut] Swelling  Pcn [Penicillins] Unknown (comments) Unsure of reaction  Phenothiazines Other (comments) Per father, Edd Ambriz has a parkinsonian reaction to this medication. \" Review of Systems Review of Systems Constitutional: Negative for diaphoresis and fever. HENT: Negative for congestion and sore throat. Eyes: Negative for pain and itching. Respiratory: Negative for cough and shortness of breath. Cardiovascular: Negative for chest pain and palpitations. Gastrointestinal: Positive for nausea. Negative for abdominal pain and diarrhea. Endocrine: Negative for polydipsia and polyuria. Genitourinary: Negative for dysuria and hematuria. Musculoskeletal: Positive for myalgias. Negative for arthralgias. Skin: Negative for rash and wound. Neurological: Negative for seizures and syncope. Hematological: Does not bruise/bleed easily. Psychiatric/Behavioral: Negative for agitation and hallucinations. Physical Exam  
 
 
Patient Vitals for the past 12 hrs: 
 Temp Pulse Resp BP SpO2  
12/13/19 1722 98.3 °F (36.8 °C) 85 16 (!) 195/103 97 % Physical Exam 
Vitals signs and nursing note reviewed. Constitutional:   
   Appearance: He is well-developed. HENT:  
   Head: Normocephalic and atraumatic. Eyes:  
   General: No scleral icterus. Conjunctiva/sclera: Conjunctivae normal.  
Neck: Musculoskeletal: Normal range of motion and neck supple. Vascular: No JVD. Cardiovascular:  
   Rate and Rhythm: Normal rate and regular rhythm. Heart sounds: Normal heart sounds. Comments: 4 intact extremity pulses Pulmonary:  
   Effort: Pulmonary effort is normal.  
   Breath sounds: Normal breath sounds. Abdominal:  
   Palpations: Abdomen is soft. There is no mass. Tenderness: There is no tenderness. Musculoskeletal: Normal range of motion. Lymphadenopathy:  
   Cervical: No cervical adenopathy. Skin: 
   General: Skin is warm and dry. Neurological:  
   Mental Status: He is alert. Diagnostic Study Results Labs - Recent Results (from the past 12 hour(s)) CBC WITH AUTOMATED DIFF Collection Time: 12/13/19  7:25 PM  
Result Value Ref Range WBC 10.1 4.6 - 13.2 K/uL  
 RBC 4.13 (L) 4.70 - 5.50 M/uL  
 HGB 11.4 (L) 13.0 - 16.0 g/dL HCT 36.1 36.0 - 48.0 % MCV 87.4 74.0 - 97.0 FL  
 MCH 27.6 24.0 - 34.0 PG  
 MCHC 31.6 31.0 - 37.0 g/dL  
 RDW 18.8 (H) 11.6 - 14.5 % PLATELET 326 350 - 108 K/uL NEUTROPHILS 84 (H) 40 - 73 % LYMPHOCYTES 7 (L) 21 - 52 % MONOCYTES 6 3 - 10 % EOSINOPHILS 3 0 - 5 % BASOPHILS 0 0 - 2 %  
 ABS. NEUTROPHILS 8.5 (H) 1.8 - 8.0 K/UL ABS. LYMPHOCYTES 0.7 (L) 0.9 - 3.6 K/UL  
 ABS. MONOCYTES 0.6 0.05 - 1.2 K/UL  
 ABS. EOSINOPHILS 0.3 0.0 - 0.4 K/UL  
 ABS. BASOPHILS 0.0 0.0 - 0.1 K/UL  
 DF AUTOMATED PLATELET COMMENTS ADEQUATE PLATELETS    
 RBC COMMENTS ANISOCYTOSIS 1+ 
    
 RBC COMMENTS OVALOCYTES 2+ 
    
 RBC COMMENTS SCHISTOCYTES 1+ METABOLIC PANEL, COMPREHENSIVE Collection Time: 12/13/19  7:25 PM  
Result Value Ref Range Sodium 142 136 - 145 mmol/L Potassium 4.2 3.5 - 5.5 mmol/L Chloride 103 100 - 111 mmol/L  
 CO2 30 21 - 32 mmol/L Anion gap 9 3.0 - 18 mmol/L Glucose 102 (H) 74 - 99 mg/dL BUN 50 (H) 7.0 - 18 MG/DL Creatinine 6.41 (H) 0.6 - 1.3 MG/DL  
 BUN/Creatinine ratio 8 (L) 12 - 20 GFR est AA 11 (L) >60 ml/min/1.73m2 GFR est non-AA 9 (L) >60 ml/min/1.73m2 Calcium 9.4 8.5 - 10.1 MG/DL Bilirubin, total 0.4 0.2 - 1.0 MG/DL  
 ALT (SGPT) 17 16 - 61 U/L  
 AST (SGOT) 15 10 - 38 U/L Alk. phosphatase 82 45 - 117 U/L Protein, total 6.8 6.4 - 8.2 g/dL Albumin 2.6 (L) 3.4 - 5.0 g/dL Globulin 4.2 (H) 2.0 - 4.0 g/dL A-G Ratio 0.6 (L) 0.8 - 1.7 Radiologic Studies - No orders to display No results found. Medications ordered:  
Medications  
sodium chloride 0.9 % bolus infusion 250 mL (250 mL IntraVENous New Bag 12/13/19 2031)  
sodium chloride 0.9 % bolus infusion 250 mL (has no administration in time range)  
acetaminophen (TYLENOL) tablet 1,000 mg (has no administration in time range) Medical Decision Making Initial Medical Decision Making and DDx: We will give him back a small amount of fluid. Evaluate for an alarming electrolyte abnormality. ED Course: Progress Notes, Reevaluation, and Consults: 
  
8:48 PM no acute events during his stay in the ER reviewed labs and nothing actionable.   He is feeling a little bit better with 250 cc of fluid we will give him another 250 cc request something for pain will give Tylenol. At that point he will be discharged and he is happy with this plan. I will advise Dr. Jean Felder that the patient still in the department but I do not anticipate he will need to provide any intervention to the patient. I am the first provider for this patient. I reviewed the vital signs, available nursing notes, past medical history, past surgical history, family history and social history. Patient Vitals for the past 12 hrs: 
 Temp Pulse Resp BP SpO2  
12/13/19 1722 98.3 °F (36.8 °C) 85 16 (!) 195/103 97 % Vital Signs-Reviewed the patient's vital signs. Pulse Oximetry Analysis, Cardiac Monitor, 12 lead ekg: Interpreted by the EP. Records Reviewed: Nursing notes reviewed (Time of Review: 5:57 PM) Procedures:  
Critical Care Time:  
Aspirin: (was aspirin given for stroke?) Diagnosis Clinical Impression: 1. Myalgia 2. ESRD (end stage renal disease) [facility] Disposition: Discharged Follow-up Information Follow up With Specialties Details Why Contact Info [facility]  In 2 days  [id number] [facility] [address] [phone] Patient's Medications Start Taking No medications on file Continue Taking ACETAMINOPHEN (TYLENOL) 500 MG TABLET    Take  by mouth every six (6) hours as needed for Pain. ALLOPURINOL (ZYLOPRIM) 100 MG TABLET    TAKE 1 TABLET BY MOUTH TWICE DAILY B COMPLEX-VITAMIN C-FOLIC ACID (NEPHROCAPS) 1 MG CAPSULE    Take 1 Cap by mouth daily. [Request refills from PCP (Dr. Adeline Priest)] CARVEDILOL (COREG) 25 MG TABLET    Take 1 Tab by mouth two (2) times daily (with meals). CHOLECALCIFEROL (VITAMIN D3) 1,000 UNIT TABLET    Take 2 Tabs by mouth two (2) times a day. [Request refills from PCP (Dr. Adeline Priest)] CINACALCET (SENSIPAR) 60 MG TAB    Take  by mouth every Monday, Thursday, Saturday. Injection during dialysis CLONIDINE HCL (CATAPRES) 0.3 MG TABLET    Take 1 Tab by mouth three (3) times daily. DOXAZOSIN (CARDURA) 2 MG TABLET    TAKE ONE TABLET BY MOUTH NIGHTLY FUROSEMIDE (LASIX) 80 MG TABLET    Take 80 mg by mouth daily. HYDRALAZINE (APRESOLINE) 100 MG TABLET    TAKE 1 TABLET BY MOUTH THREE TIMES DAILY. ISOSORBIDE MONONITRATE ER (IMDUR) 120 MG CR TABLET    Take 1 Tab by mouth every morning. 1 tab by mouth daily LEVETIRACETAM (KEPPRA) 250 MG TABLET    TAKE 1 TABLET BY MOUTH EVERY Monday, Wednesday, Friday AFTER EACH DIALYSIS  
 LEVETIRACETAM (KEPPRA) 500 MG TABLET    TAKE 1 TABLET BY MOUTH DAILY  
 LISINOPRIL (PRINIVIL, ZESTRIL) 40 MG TABLET    TAKE 1 TABLET BY MOUTH DAILY MUPIROCIN CALCIUM (BACTROBAN NASAL) 2 % NASAL OINTMENT    by Both Nostrils route daily as needed. Indications: as  needed NIFEDIPINE ER (PROCARDIA XL) 90 MG ER TABLET    Take 90 mg by mouth daily. PANTOPRAZOLE (PROTONIX) 40 MG TABLET    Take 1 Tab by mouth Daily (before breakfast). [Request refills from PCP (Dr. Alma Priest)] SIMETHICONE (PHAZYME) 180 MG CAP    Take  by mouth daily as needed. SUCROFERRIC OXYHYDROXIDE (VELPHORO PO)    Take  by mouth Before breakfast, lunch, and dinner. THIAMINE (VITAMIN B-1) 100 MG TABLET    Take  by mouth daily. These Medications have changed No medications on file Stop Taking No medications on file  
 
_______________________________ Notes:   
Víctor Mack MD using Dragon dictation     
_______________________________

## 2019-12-13 NOTE — ED TRIAGE NOTES
Pt arrived through triage with c/o HTN. Pt states he went for dialysis today and is not feeling well.

## 2019-12-14 NOTE — DISCHARGE INSTRUCTIONS
Patient Education        Chronic Kidney Disease: Care Instructions  Your Care Instructions    Chronic kidney disease happens when your kidneys don't work as well as they should. Your kidneys have a few important jobs. They remove waste from your blood. This waste leaves your body in your urine. They also balance your body's fluids and chemicals. When your kidneys don't work well, extra waste and fluid can build up. This can poison the body and sometimes cause death. The most common causes of this disease are diabetes and high blood pressure. In some cases, the disease develops in 2 to 3 months. But it usually develops over many years. If you take medicine and make healthy changes to your lifestyle, you may be able to prevent the disease from getting worse. But if your kidney damage gets worse, you may need dialysis or a kidney transplant. Dialysis uses a machine to filter waste from the blood. A transplant is surgery to give you a healthy kidney from another person. Follow-up care is a key part of your treatment and safety. Be sure to make and go to all appointments, and call your doctor if you are having problems. It's also a good idea to know your test results and keep a list of the medicines you take. How can you care for yourself at home?   Treatments and appointments    · Be safe with medicines. Take your medicines exactly as prescribed. Call your doctor if you have any problems with your medicine. You also may take medicine to control your blood pressure or to treat diabetes. Many people who have diabetes take blood pressure medicine.     · If you have diabetes, do your best to keep your blood sugar in your target range. You may do this by eating healthy food and exercising. You may also take medicines.     · Go to your dialysis appointments if you have this treatment.     · Do not take ibuprofen (Advil, Motrin), naproxen (Aleve), or similar medicines, unless your doctor tells you to.  These may make the disease worse.     · Do not take any vitamins, over-the-counter medicines, or herbal products without talking to your doctor first.     · Do not smoke or use other tobacco products. Smoking can reduce blood flow to the kidneys. If you need help quitting, talk to your doctor about stop-smoking programs and medicines. These can increase your chances of quitting for good.     · Do not drink alcohol or use illegal drugs.     · Talk to your doctor about an exercise plan. Exercise helps lower your blood pressure. It also makes you feel better.     · If you have an advance directive, let your doctor know. It may include a living will and a durable power of  for health care. If you don't have one, you may want to prepare one. It lets your doctor and loved ones know your health care wishes if you become unable to speak for yourself. Diet    · Talk to a registered dietitian. He or she can help you make a meal plan that is right for you. Most people with kidney disease need to limit salt (sodium), fluids, and protein. Some also have to limit potassium and phosphorus.     · You may have to give up many foods you like. But try to focus on the fact that this will help you stay healthy for as long as possible.     · If you have a hard time eating enough, talk to your doctor or dietitian about ways to add calories to your diet.     · Your diet may change as your disease changes. See your doctor for regular testing. And work with a dietitian to change your diet as needed. When should you call for help? Call 911 anytime you think you may need emergency care.  For example, call if:    · You passed out (lost consciousness).    Call your doctor now or seek immediate medical care if:    · You have less urine than normal or no urine.     · You have trouble urinating or can urinate only very small amounts.     · You are confused or have trouble thinking clearly.     · You feel weaker or more tired than usual.     · You are very thirsty, lightheaded, or dizzy.     · You have nausea and vomiting.     · You have new swelling of your arms or feet, or your swelling is worse.     · You have blood in your urine.     · You have new or worse trouble breathing.    Watch closely for changes in your health, and be sure to contact your doctor if:    · You have any problems with your medicine or other treatment. Where can you learn more? Go to http://randa-pavel.info/. Enter N276 in the search box to learn more about \"Chronic Kidney Disease: Care Instructions. \"  Current as of: October 31, 2018  Content Version: 12.2  © 4640-7884 [employer]. Care instructions adapted under license by [employer] (which disclaims liability or warranty for this information). If you have questions about a medical condition or this instruction, always ask your healthcare professional. [address] any warranty or liability for your use of this information.

## 2019-12-17 NOTE — PROGRESS NOTES
Received paperwork from dialysis center, when I spoke to patient he stated he was not having any trouble with his access.

## 2019-12-26 NOTE — PROCEDURES
RADIOLOGY POST PARACENTESIS NOTE     December 26, 2019       12:53 PM     Preoperative Diagnosis:   Ascites    Postoperative Diagnosis:  Same. :  Zi Pennington PA-C    Assistant:  None. Type of Anesthesia: 1% plain lidocaine    Procedure/Description:  US-Guided paracentesis    Findings:  Using ultrasound guidance the largest pocket of peritoneal fluid was localized and marked at the right lower quadrant. The patient was prepped and draped in the usual fashion. 1% Lidocaine was infiltrated locally. A 5 [place]ian over the needle catheter was advanced into the peritoneal cavity and cloudy yellow colored fluid was aspirated. Once fluid was easily aspirated, the needle was removed leaving the catheter in place. The catheter was connected to vacuum containers and 1.6 liters of ascitic fluid was removed.     Estimated blood Loss:  Minimal    Specimen Removed:   No    Complications: None    Condition: Stable    Discharge Plan:  discharge home     Jessica Horvath

## 2019-12-26 NOTE — H&P
OUTPATIENT HISTORY AND PHYSICAL      Today 12/26/2019     Indication/Symptoms:   Ewa Sosa is a 47 y.o. male with recurrent ascites who presents for an US-guided paracentesis. Current Meds:    Prior to Admission medications    Medication Sig Start Date End Date Taking? Authorizing Provider   carvedilol (COREG) 25 mg tablet Take 1 Tab by mouth two (2) times daily (with meals). 10/22/19   Adalberto Cuenca NP   hydrALAZINE (APRESOLINE) 100 mg tablet TAKE 1 TABLET BY MOUTH THREE TIMES DAILY. 9/3/19   Davis Maxwell MD   furosemide (LASIX) 80 mg tablet Take 80 mg by mouth daily. Provider, Historical   isosorbide mononitrate ER (IMDUR) 120 mg CR tablet Take 1 Tab by mouth every morning. 1 tab by mouth daily  Patient taking differently: Take 90 mg by mouth every morning. 1 tab by mouth daily 7/5/19   Mimi Cuenca NP   doxazosin (CARDURA) 2 mg tablet TAKE ONE TABLET BY MOUTH NIGHTLY 6/5/19   Davis Maxwell MD   cloNIDine HCl (CATAPRES) 0.3 mg tablet Take 1 Tab by mouth three (3) times daily. 3/18/19   Adalberto Cuenca NP   sucroferric oxyhydroxide (VELPHORO PO) Take  by mouth Before breakfast, lunch, and dinner. Provider, Historical   cinacalcet (SENSIPAR) 60 mg tab Take  by mouth every Monday, Thursday, Saturday. Injection during dialysis    Provider, Historical   allopurinol (ZYLOPRIM) 100 mg tablet TAKE 1 TABLET BY MOUTH TWICE DAILY 3/15/18   Rey Castorena MD   lisinopril (PRINIVIL, ZESTRIL) 40 mg tablet TAKE 1 TABLET BY MOUTH DAILY 4/19/17   Lucía De La Torre MD   NIFEdipine ER (PROCARDIA XL) 90 mg ER tablet Take 90 mg by mouth daily. Provider, Historical   thiamine (VITAMIN B-1) 100 mg tablet Take  by mouth daily. Provider, Historical   mupirocin calcium (BACTROBAN NASAL) 2 % nasal ointment by Both Nostrils route daily as needed. Indications: as  needed    Provider, Historical   b complex-vitamin c-folic acid (NEPHROCAPS) 1 mg capsule Take 1 Cap by mouth daily.  Elza Nino refills from PCP (Dr. Jackie Priest)] 3/31/17   Leana Wood MD   levETIRAcetam (KEPPRA) 250 mg tablet TAKE 1 TABLET BY MOUTH EVERY Monday, Wednesday, Friday AFTER [facility] DIALYSIS 12/8/16   Christel Rodgers MD   levETIRAcetam (KEPPRA) 500 mg tablet TAKE 1 TABLET BY MOUTH DAILY 12/8/16   Christel Rodgers MD   acetaminophen (TYLENOL) 500 mg tablet Take  by mouth every six (6) hours as needed for Pain. Provider, Historical   simethicone (PHAZYME) 180 mg cap Take  by mouth daily as needed. Provider, Historical   pantoprazole (PROTONIX) 40 mg tablet Take 1 Tab by mouth Daily (before breakfast). [Request refills from PCP (Dr. Jackie Priest)] 5/8/13   Cherylene Rider, MD   cholecalciferol (VITAMIN D3) 1,000 unit tablet Take 2 Tabs by mouth two (2) times a day. [Request refills from PCP (Dr. Jackie Priest)] 5/8/13   Cherylene Rider, MD       Allergies: Allergies   Allergen Reactions    Amlodipine Swelling    Nuts [Tree Nut] Swelling    Pcn [Penicillins] Unknown (comments)     Unsure of reaction    Phenothiazines Other (comments)     Per father, \"He has a parkinsonian reaction to this medication. \"       Comorbid Conditions:    Past Medical History:   Diagnosis Date    Anemia in chronic kidney disease(285.21) 4/24/2013    Ascites     Cardiomyopathy due to hypertension ([facility] 4/24/2013    EF 20%    Chronic kidney disease     dialysis MWF at [facility] on [address]  [phone]    Edema of lower extremity 4/24/2013    End stage renal disease ([address] 04/19/2013    HD since 9/2003    Hyperphosphatemia 4/24/2013    Hypertension     Hypertension, uncontrolled 4/24/2013    Personal history of atrial flutter 4/24/2013    Pulmonary hypertension ([address] 4/24/2013    Recommendation refused by patient 4/24/2013    Patient refuses dialysis (hemodialysis or peritoneal dialysis)    Secondary hyperparathyroidism of renal origin ([address] 4/24/2013    Seizures ([address]     on Keppra, last seizure 1/2015    Sleep apnea     no cpap    Stroke [facility]     TIA 2015    Vitamin D insufficiency 4/25/2013    Vitamin D 25-Hydroxy 20.7           Past Surgical History:   Procedure Laterality Date    HX CSF SHUNT  09/2016    HX HEART CATHETERIZATION  01/2015     Data:    There were no vitals taken for this visit.:  No results for input(s): PLT, PLTEXT in the last 72 hours. No lab exists for component:  HCT  No results for input(s): INR, APTT, INREXT in the last 72 hours. No lab exists for component: PT    The H & P and/or progress notes and any available imaging were reviewed. The risks, indications and possible alternatives to the procedure, including doing nothing, were discussed and informed consent was obtained. Physical Exam:      Mental status:   Alert and oriented. Heart:   Regular rate. Lungs:  Normal respiratory effort. The patient is an appropriate candidate to undergo the planned procedure.      Sofia Thaxton, [address]

## 2020-01-01 ENCOUNTER — PATIENT OUTREACH (OUTPATIENT)
Dept: CASE MANAGEMENT | Age: 50
End: 2020-01-01

## 2020-01-01 ENCOUNTER — OFFICE VISIT (OUTPATIENT)
Dept: FAMILY MEDICINE CLINIC | Age: 50
End: 2020-01-01

## 2020-01-01 ENCOUNTER — HOSPITAL ENCOUNTER (OUTPATIENT)
Dept: ULTRASOUND IMAGING | Age: 50
Discharge: HOME | End: 2020-04-09
Attending: NURSE PRACTITIONER
Payer: MEDICARE

## 2020-01-01 ENCOUNTER — VIRTUAL VISIT (OUTPATIENT)
Dept: FAMILY MEDICINE CLINIC | Age: 50
End: 2020-01-01

## 2020-01-01 ENCOUNTER — TELEPHONE (OUTPATIENT)
Dept: FAMILY MEDICINE CLINIC | Age: 50
End: 2020-01-01

## 2020-01-01 ENCOUNTER — HOME HEALTH ADMISSION (OUTPATIENT)
Dept: HOME HEALTH SERVICES | Facility: HOME HEALTH | Age: 50
End: 2020-01-01

## 2020-01-01 ENCOUNTER — HOSPITAL ENCOUNTER (OUTPATIENT)
Dept: LAB | Age: 50
Discharge: HOME | End: 2020-04-17
Payer: MEDICARE

## 2020-01-01 ENCOUNTER — HOSPITAL ENCOUNTER (OUTPATIENT)
Dept: ULTRASOUND IMAGING | Age: 50
Discharge: HOME | End: 2020-02-19
Attending: NURSE PRACTITIONER
Payer: MEDICARE

## 2020-01-01 ENCOUNTER — HOSPITAL ENCOUNTER (OUTPATIENT)
Dept: ULTRASOUND IMAGING | Age: 50
Discharge: HOME | End: 2020-01-16
Attending: NURSE PRACTITIONER
Payer: MEDICARE

## 2020-01-01 ENCOUNTER — HOSPITAL ENCOUNTER (OUTPATIENT)
Dept: ULTRASOUND IMAGING | Age: 50
Discharge: HOME | End: 2020-06-04
Attending: NURSE PRACTITIONER
Payer: MEDICARE

## 2020-01-01 ENCOUNTER — HOSPITAL ENCOUNTER (EMERGENCY)
Age: 50
Discharge: HOME | End: 2020-06-15
Attending: EMERGENCY MEDICINE
Payer: MEDICARE

## 2020-01-01 ENCOUNTER — HOSPITAL ENCOUNTER (OUTPATIENT)
Dept: LAB | Age: 50
Discharge: HOME | End: 2020-03-19
Attending: NURSE PRACTITIONER
Payer: MEDICARE

## 2020-01-01 ENCOUNTER — HOSPITAL ENCOUNTER (EMERGENCY)
Age: 50
Discharge: HOME | End: 2020-05-29
Attending: EMERGENCY MEDICINE
Payer: MEDICARE

## 2020-01-01 ENCOUNTER — APPOINTMENT (OUTPATIENT)
Dept: VASCULAR SURGERY | Age: 50
End: 2020-01-01
Attending: EMERGENCY MEDICINE
Payer: MEDICARE

## 2020-01-01 ENCOUNTER — HOSPITAL ENCOUNTER (OUTPATIENT)
Dept: ULTRASOUND IMAGING | Age: 50
Discharge: HOME | End: 2020-03-19
Attending: NURSE PRACTITIONER
Payer: MEDICARE

## 2020-01-01 ENCOUNTER — HOSPITAL ENCOUNTER (OUTPATIENT)
Dept: LAB | Age: 50
Discharge: HOME | End: 2020-06-04
Attending: NURSE PRACTITIONER
Payer: MEDICARE

## 2020-01-01 ENCOUNTER — HOSPITAL ENCOUNTER (EMERGENCY)
Age: 50
Discharge: HOME | End: 2020-03-10
Attending: EMERGENCY MEDICINE
Payer: MEDICARE

## 2020-01-01 ENCOUNTER — HOSPITAL ENCOUNTER (OUTPATIENT)
Dept: ULTRASOUND IMAGING | Age: 50
Discharge: HOME | End: 2020-02-06
Attending: NURSE PRACTITIONER
Payer: MEDICARE

## 2020-01-01 ENCOUNTER — HOSPITAL ENCOUNTER (OUTPATIENT)
Dept: LAB | Age: 50
Discharge: HOME | End: 2020-02-06
Attending: NURSE PRACTITIONER
Payer: MEDICARE

## 2020-01-01 VITALS
TEMPERATURE: 96.2 F | OXYGEN SATURATION: 93 % | BODY MASS INDEX: 26.04 KG/M2 | WEIGHT: 186 LBS | SYSTOLIC BLOOD PRESSURE: 210 MMHG | DIASTOLIC BLOOD PRESSURE: 100 MMHG | HEART RATE: 77 BPM | HEIGHT: 71 IN | RESPIRATION RATE: 18 BRPM

## 2020-01-01 VITALS
OXYGEN SATURATION: 95 % | HEART RATE: 79 BPM | TEMPERATURE: 98.8 F | SYSTOLIC BLOOD PRESSURE: 174 MMHG | WEIGHT: 187.39 LBS | RESPIRATION RATE: 16 BRPM | HEIGHT: 71 IN | DIASTOLIC BLOOD PRESSURE: 105 MMHG | BODY MASS INDEX: 26.23 KG/M2

## 2020-01-01 VITALS
DIASTOLIC BLOOD PRESSURE: 114 MMHG | TEMPERATURE: 98.5 F | HEART RATE: 77 BPM | WEIGHT: 187 LBS | OXYGEN SATURATION: 100 % | BODY MASS INDEX: 26.18 KG/M2 | HEIGHT: 71 IN | RESPIRATION RATE: 16 BRPM | SYSTOLIC BLOOD PRESSURE: 225 MMHG

## 2020-01-01 VITALS
RESPIRATION RATE: 16 BRPM | SYSTOLIC BLOOD PRESSURE: 189 MMHG | BODY MASS INDEX: 26.18 KG/M2 | DIASTOLIC BLOOD PRESSURE: 98 MMHG | OXYGEN SATURATION: 95 % | WEIGHT: 187 LBS | HEART RATE: 77 BPM | TEMPERATURE: 98.5 F | HEIGHT: 71 IN

## 2020-01-01 DIAGNOSIS — R18.8 OTHER ASCITES: ICD-10-CM

## 2020-01-01 DIAGNOSIS — R56.9 SEIZURE (HCC): Primary | ICD-10-CM

## 2020-01-01 DIAGNOSIS — R56.9 CONVULSIONS, UNSPECIFIED CONVULSION TYPE (HCC): ICD-10-CM

## 2020-01-01 DIAGNOSIS — D64.9 ANEMIA, UNSPECIFIED TYPE: ICD-10-CM

## 2020-01-01 DIAGNOSIS — I10 UNCONTROLLED HYPERTENSION: ICD-10-CM

## 2020-01-01 DIAGNOSIS — N18.6 ESRD (END STAGE RENAL DISEASE) (HCC): ICD-10-CM

## 2020-01-01 DIAGNOSIS — I10 ESSENTIAL HYPERTENSION: ICD-10-CM

## 2020-01-01 DIAGNOSIS — R22.42 LOCALIZED SWELLING OF LEFT LOWER LEG: Primary | ICD-10-CM

## 2020-01-01 DIAGNOSIS — R60.0 LOWER LEG EDEMA: ICD-10-CM

## 2020-01-01 DIAGNOSIS — I10 HYPERTENSION, UNSPECIFIED TYPE: ICD-10-CM

## 2020-01-01 DIAGNOSIS — I87.8 VENOUS STASIS: Primary | ICD-10-CM

## 2020-01-01 DIAGNOSIS — R06.02 SOB (SHORTNESS OF BREATH): ICD-10-CM

## 2020-01-01 DIAGNOSIS — R06.02 SOB (SHORTNESS OF BREATH): Primary | ICD-10-CM

## 2020-01-01 DIAGNOSIS — R60.0 BILATERAL LEG EDEMA: Primary | ICD-10-CM

## 2020-01-01 DIAGNOSIS — R18.8 ASCITES: ICD-10-CM

## 2020-01-01 DIAGNOSIS — L97.921 ULCER OF LEFT LOWER EXTREMITY, LIMITED TO BREAKDOWN OF SKIN (HCC): ICD-10-CM

## 2020-01-01 LAB
ALBUMIN SERPL-MCNC: 1.6 G/DL (ref 3.4–5)
ALBUMIN SERPL-MCNC: 1.7 G/DL (ref 3.4–5)
ALBUMIN SERPL-MCNC: 2 G/DL (ref 3.4–5)
ALBUMIN SERPL-MCNC: 2.1 G/DL (ref 3.4–5)
ALBUMIN/GLOB SERPL: 0.4 {RATIO} (ref 0.8–1.7)
ALBUMIN/GLOB SERPL: 0.5 {RATIO} (ref 0.8–1.7)
ALBUMIN/GLOB SERPL: 0.6 {RATIO} (ref 0.8–1.7)
ALBUMIN/GLOB SERPL: 0.7 {RATIO} (ref 0.8–1.7)
ALP SERPL-CCNC: 113 U/L (ref 45–117)
ALP SERPL-CCNC: 71 U/L (ref 45–117)
ALP SERPL-CCNC: 75 U/L (ref 45–117)
ALP SERPL-CCNC: 85 U/L (ref 45–117)
ALT SERPL-CCNC: 11 U/L (ref 16–61)
ALT SERPL-CCNC: 11 U/L (ref 16–61)
ALT SERPL-CCNC: 15 U/L (ref 16–61)
ALT SERPL-CCNC: 25 U/L (ref 16–61)
ANION GAP SERPL CALC-SCNC: 13 MMOL/L (ref 3–18)
ANION GAP SERPL CALC-SCNC: 14 MMOL/L (ref 3–18)
ANION GAP SERPL CALC-SCNC: 5 MMOL/L (ref 3–18)
ANION GAP SERPL CALC-SCNC: 8 MMOL/L (ref 3–18)
APTT PPP: 32.6 SEC (ref 23–36.4)
APTT PPP: 35.4 SEC (ref 23–36.4)
APTT PPP: 35.6 SEC (ref 23–36.4)
AST SERPL-CCNC: 11 U/L (ref 10–38)
AST SERPL-CCNC: 11 U/L (ref 10–38)
AST SERPL-CCNC: 16 U/L (ref 10–38)
AST SERPL-CCNC: 26 U/L (ref 10–38)
BASOPHILS # BLD: 0 K/UL (ref 0–0.1)
BASOPHILS NFR BLD: 0 % (ref 0–2)
BILIRUB SERPL-MCNC: 0.4 MG/DL (ref 0.2–1)
BILIRUB SERPL-MCNC: 0.4 MG/DL (ref 0.2–1)
BILIRUB SERPL-MCNC: 0.6 MG/DL (ref 0.2–1)
BILIRUB SERPL-MCNC: 0.6 MG/DL (ref 0.2–1)
BNP SERPL-MCNC: ABNORMAL PG/ML (ref 0–900)
BUN SERPL-MCNC: 101 MG/DL (ref 7–18)
BUN SERPL-MCNC: 105 MG/DL (ref 7–18)
BUN SERPL-MCNC: 35 MG/DL (ref 7–18)
BUN SERPL-MCNC: 97 MG/DL (ref 7–18)
BUN/CREAT SERPL: 10 (ref 12–20)
BUN/CREAT SERPL: 11 (ref 12–20)
BUN/CREAT SERPL: 13 (ref 12–20)
BUN/CREAT SERPL: 7 (ref 12–20)
CALCIUM SERPL-MCNC: 7.9 MG/DL (ref 8.5–10.1)
CALCIUM SERPL-MCNC: 8.2 MG/DL (ref 8.5–10.1)
CALCIUM SERPL-MCNC: 8.8 MG/DL (ref 8.5–10.1)
CALCIUM SERPL-MCNC: 9.7 MG/DL (ref 8.5–10.1)
CHLORIDE SERPL-SCNC: 102 MMOL/L (ref 100–111)
CHLORIDE SERPL-SCNC: 106 MMOL/L (ref 100–111)
CHLORIDE SERPL-SCNC: 108 MMOL/L (ref 100–111)
CHLORIDE SERPL-SCNC: 109 MMOL/L (ref 100–111)
CO2 SERPL-SCNC: 22 MMOL/L (ref 21–32)
CO2 SERPL-SCNC: 24 MMOL/L (ref 21–32)
CO2 SERPL-SCNC: 25 MMOL/L (ref 21–32)
CO2 SERPL-SCNC: 32 MMOL/L (ref 21–32)
CREAT SERPL-MCNC: 10 MG/DL (ref 0.6–1.3)
CREAT SERPL-MCNC: 10.4 MG/DL (ref 0.6–1.3)
CREAT SERPL-MCNC: 5 MG/DL (ref 0.6–1.3)
CREAT SERPL-MCNC: 7.58 MG/DL (ref 0.6–1.3)
DIFFERENTIAL METHOD BLD: ABNORMAL
EOSINOPHIL # BLD: 0.2 K/UL (ref 0–0.4)
EOSINOPHIL # BLD: 0.3 K/UL (ref 0–0.4)
EOSINOPHIL # BLD: 0.3 K/UL (ref 0–0.4)
EOSINOPHIL NFR BLD: 2 % (ref 0–5)
EOSINOPHIL NFR BLD: 4 % (ref 0–5)
EOSINOPHIL NFR BLD: 4 % (ref 0–5)
ERYTHROCYTE [DISTWIDTH] IN BLOOD BY AUTOMATED COUNT: 18.5 % (ref 11.6–14.5)
ERYTHROCYTE [DISTWIDTH] IN BLOOD BY AUTOMATED COUNT: 19.3 % (ref 11.6–14.5)
ERYTHROCYTE [DISTWIDTH] IN BLOOD BY AUTOMATED COUNT: 20.3 % (ref 11.6–14.5)
ERYTHROCYTE [DISTWIDTH] IN BLOOD BY AUTOMATED COUNT: 20.7 % (ref 11.6–14.5)
GLOBULIN SER CALC-MCNC: 2.9 G/DL (ref 2–4)
GLOBULIN SER CALC-MCNC: 2.9 G/DL (ref 2–4)
GLOBULIN SER CALC-MCNC: 3.6 G/DL (ref 2–4)
GLOBULIN SER CALC-MCNC: 4.1 G/DL (ref 2–4)
GLUCOSE SERPL-MCNC: 102 MG/DL (ref 74–99)
GLUCOSE SERPL-MCNC: 127 MG/DL (ref 74–99)
GLUCOSE SERPL-MCNC: 68 MG/DL (ref 74–99)
GLUCOSE SERPL-MCNC: 99 MG/DL (ref 74–99)
HCT VFR BLD AUTO: 25.8 % (ref 36–48)
HCT VFR BLD AUTO: 27.4 % (ref 36–48)
HCT VFR BLD AUTO: 27.6 % (ref 36–48)
HCT VFR BLD AUTO: 35.6 % (ref 36–48)
HGB BLD-MCNC: 11 G/DL (ref 13–16)
HGB BLD-MCNC: 8.1 G/DL (ref 13–16)
HGB BLD-MCNC: 8.2 G/DL (ref 13–16)
HGB BLD-MCNC: 8.4 G/DL (ref 13–16)
INR PPP: 1.3 (ref 0.8–1.2)
INR PPP: 1.4 (ref 0.8–1.2)
INR PPP: 1.4 (ref 0.8–1.2)
LYMPHOCYTES # BLD: 0.5 K/UL (ref 0.9–3.6)
LYMPHOCYTES # BLD: 0.6 K/UL (ref 0.9–3.6)
LYMPHOCYTES # BLD: 0.7 K/UL (ref 0.9–3.6)
LYMPHOCYTES NFR BLD: 6 % (ref 21–52)
LYMPHOCYTES NFR BLD: 7 % (ref 21–52)
LYMPHOCYTES NFR BLD: 7 % (ref 21–52)
MCH RBC QN AUTO: 26.5 PG (ref 24–34)
MCH RBC QN AUTO: 27.1 PG (ref 24–34)
MCH RBC QN AUTO: 27.3 PG (ref 24–34)
MCH RBC QN AUTO: 27.6 PG (ref 24–34)
MCHC RBC AUTO-ENTMCNC: 29.9 G/DL (ref 31–37)
MCHC RBC AUTO-ENTMCNC: 30.4 G/DL (ref 31–37)
MCHC RBC AUTO-ENTMCNC: 30.9 G/DL (ref 31–37)
MCHC RBC AUTO-ENTMCNC: 31.4 G/DL (ref 31–37)
MCV RBC AUTO: 86.9 FL (ref 74–97)
MCV RBC AUTO: 88.4 FL (ref 74–97)
MCV RBC AUTO: 89 FL (ref 74–97)
MCV RBC AUTO: 89.2 FL (ref 74–97)
MONOCYTES # BLD: 0.3 K/UL (ref 0.05–1.2)
MONOCYTES # BLD: 0.4 K/UL (ref 0.05–1.2)
MONOCYTES # BLD: 0.7 K/UL (ref 0.05–1.2)
MONOCYTES NFR BLD: 3 % (ref 3–10)
MONOCYTES NFR BLD: 5 % (ref 3–10)
MONOCYTES NFR BLD: 6 % (ref 3–10)
NEUTS SEG # BLD: 5.9 K/UL (ref 1.8–8)
NEUTS SEG # BLD: 7.1 K/UL (ref 1.8–8)
NEUTS SEG # BLD: 9.3 K/UL (ref 1.8–8)
NEUTS SEG NFR BLD: 84 % (ref 40–73)
NEUTS SEG NFR BLD: 86 % (ref 40–73)
NEUTS SEG NFR BLD: 86 % (ref 40–73)
PLATELET # BLD AUTO: 171 K/UL (ref 135–420)
PLATELET # BLD AUTO: 196 K/UL (ref 135–420)
PLATELET # BLD AUTO: 219 K/UL (ref 135–420)
PLATELET # BLD AUTO: 222 K/UL (ref 135–420)
PLATELET # BLD AUTO: 223 K/UL (ref 135–420)
PLATELET # BLD AUTO: 228 K/UL (ref 135–420)
PLATELET COMMENTS,PCOM: ABNORMAL
PMV BLD AUTO: 10.1 FL (ref 9.2–11.8)
PMV BLD AUTO: 10.5 FL (ref 9.2–11.8)
PMV BLD AUTO: 12.3 FL (ref 9.2–11.8)
POTASSIUM SERPL-SCNC: 4.2 MMOL/L (ref 3.5–5.5)
POTASSIUM SERPL-SCNC: 4.8 MMOL/L (ref 3.5–5.5)
POTASSIUM SERPL-SCNC: 5.8 MMOL/L (ref 3.5–5.5)
POTASSIUM SERPL-SCNC: 5.9 MMOL/L (ref 3.5–5.5)
PROT SERPL-MCNC: 4.6 G/DL (ref 6.4–8.2)
PROT SERPL-MCNC: 4.9 G/DL (ref 6.4–8.2)
PROT SERPL-MCNC: 5.2 G/DL (ref 6.4–8.2)
PROT SERPL-MCNC: 6.2 G/DL (ref 6.4–8.2)
PROTHROMBIN TIME: 16.4 SEC (ref 11.5–15.2)
PROTHROMBIN TIME: 16.6 SEC (ref 11.5–15.2)
PROTHROMBIN TIME: 16.8 SEC (ref 11.5–15.2)
RBC # BLD AUTO: 2.97 M/UL (ref 4.7–5.5)
RBC # BLD AUTO: 3.1 M/UL (ref 4.7–5.5)
RBC # BLD AUTO: 3.1 M/UL (ref 4.7–5.5)
RBC # BLD AUTO: 3.99 M/UL (ref 4.7–5.5)
RBC MORPH BLD: ABNORMAL
SODIUM SERPL-SCNC: 139 MMOL/L (ref 136–145)
SODIUM SERPL-SCNC: 142 MMOL/L (ref 136–145)
SODIUM SERPL-SCNC: 143 MMOL/L (ref 136–145)
SODIUM SERPL-SCNC: 144 MMOL/L (ref 136–145)
WBC # BLD AUTO: 10.9 K/UL (ref 4.6–13.2)
WBC # BLD AUTO: 7.1 K/UL (ref 4.6–13.2)
WBC # BLD AUTO: 8.2 K/UL (ref 4.6–13.2)
WBC # BLD AUTO: 8.3 K/UL (ref 4.6–13.2)

## 2020-01-01 PROCEDURE — 85025 COMPLETE CBC W/AUTO DIFF WBC: CPT

## 2020-01-01 PROCEDURE — 85610 PROTHROMBIN TIME: CPT

## 2020-01-01 PROCEDURE — 99282 EMERGENCY DEPT VISIT SF MDM: CPT

## 2020-01-01 PROCEDURE — 49083 ABD PARACENTESIS W/IMAGING: CPT

## 2020-01-01 PROCEDURE — 85027 COMPLETE CBC AUTOMATED: CPT

## 2020-01-01 PROCEDURE — 90935 HEMODIALYSIS ONE EVALUATION: CPT

## 2020-01-01 PROCEDURE — 36415 COLL VENOUS BLD VENIPUNCTURE: CPT

## 2020-01-01 PROCEDURE — 80053 COMPREHEN METABOLIC PANEL: CPT

## 2020-01-01 PROCEDURE — 83880 ASSAY OF NATRIURETIC PEPTIDE: CPT

## 2020-01-01 PROCEDURE — 85730 THROMBOPLASTIN TIME PARTIAL: CPT

## 2020-01-01 PROCEDURE — 93971 EXTREMITY STUDY: CPT

## 2020-01-01 PROCEDURE — 76705 ECHO EXAM OF ABDOMEN: CPT

## 2020-01-01 PROCEDURE — 99284 EMERGENCY DEPT VISIT MOD MDM: CPT

## 2020-01-01 PROCEDURE — 85049 AUTOMATED PLATELET COUNT: CPT

## 2020-01-01 PROCEDURE — 74011250637 HC RX REV CODE- 250/637: Performed by: NURSE PRACTITIONER

## 2020-01-01 PROCEDURE — 77030011256 HC DRSG MEPILEX <16IN NO BORD MOLN -A

## 2020-01-01 PROCEDURE — 99281 EMR DPT VST MAYX REQ PHY/QHP: CPT

## 2020-01-01 PROCEDURE — 93970 EXTREMITY STUDY: CPT

## 2020-01-01 RX ORDER — SODIUM CHLORIDE 0.9 % (FLUSH) 0.9 %
5-40 SYRINGE (ML) INJECTION AS NEEDED
Status: DISCONTINUED | OUTPATIENT
Start: 2020-01-01 | End: 2020-01-01 | Stop reason: HOSPADM

## 2020-01-01 RX ORDER — SEVELAMER CARBONATE 800 MG/1
TABLET, FILM COATED ORAL 3 TIMES DAILY
COMMUNITY

## 2020-01-01 RX ORDER — TRIAMCINOLONE ACETONIDE 5 MG/G
CREAM TOPICAL 2 TIMES DAILY
Qty: 15 G | Refills: 0 | Status: SHIPPED | OUTPATIENT
Start: 2020-01-01 | End: 2020-01-01 | Stop reason: SDUPTHER

## 2020-01-01 RX ORDER — ISOSORBIDE MONONITRATE 120 MG/1
TABLET, EXTENDED RELEASE ORAL
Qty: 90 TAB | Refills: 1 | Status: SHIPPED | OUTPATIENT
Start: 2020-01-01 | End: 2020-01-01

## 2020-01-01 RX ORDER — SODIUM CHLORIDE 0.9 % (FLUSH) 0.9 %
5-40 SYRINGE (ML) INJECTION EVERY 8 HOURS
Status: DISCONTINUED | OUTPATIENT
Start: 2020-01-01 | End: 2020-01-01 | Stop reason: HOSPADM

## 2020-01-01 RX ORDER — ONDANSETRON 4 MG/1
4 TABLET, FILM COATED ORAL
Status: DISCONTINUED | OUTPATIENT
Start: 2020-01-01 | End: 2020-01-01 | Stop reason: HOSPADM

## 2020-01-01 RX ORDER — ISOSORBIDE MONONITRATE 120 MG/1
TABLET, EXTENDED RELEASE ORAL
Qty: 90 TAB | Refills: 1 | Status: SHIPPED | OUTPATIENT
Start: 2020-01-01

## 2020-01-01 RX ORDER — TRIAMCINOLONE ACETONIDE 5 MG/G
CREAM TOPICAL 2 TIMES DAILY
Qty: 30 G | Refills: 0 | Status: SHIPPED | OUTPATIENT
Start: 2020-01-01

## 2020-01-01 RX ORDER — OXYCODONE AND ACETAMINOPHEN 5; 325 MG/1; MG/1
2 TABLET ORAL
Status: COMPLETED | OUTPATIENT
Start: 2020-01-01 | End: 2020-01-01

## 2020-01-01 RX ADMIN — OXYCODONE HYDROCHLORIDE AND ACETAMINOPHEN 2 TABLET: 5; 325 TABLET ORAL at 21:34

## 2020-01-16 NOTE — PROCEDURES
RADIOLOGY POST PARACENTESIS NOTE     January 16, 2020       11:32 AM     Preoperative Diagnosis:   Ascites    Postoperative Diagnosis:  Same. :  Estella Howe PA-C    Assistant:  None. Type of Anesthesia: 1% plain lidocaine    Procedure/Description:  US-Guided paracentesis    Findings:  Using ultrasound guidance the largest pocket of peritoneal fluid was localized and marked at the left lower quadrant. The patient was prepped and draped in the usual fashion. 1% Lidocaine was infiltrated locally. A 5 Singaporean over the needle catheter was advanced into the peritoneal cavity and clear yellow colored fluid was aspirated. Once fluid was easily aspirated, the needle was removed leaving the catheter in place. The catheter was connected to vacuum containers and 1.525 liters of ascitic fluid was removed.     Estimated blood Loss:  Minimal    Specimen Removed:   No    Complications: None    Condition: Stable    Discharge Plan:  discharge home     Jessica Wilburn

## 2020-01-16 NOTE — H&P
OUTPATIENT HISTORY AND PHYSICAL      Today 1/16/2020     Indication/Symptoms:   Estee Parker is a 47 y.o. male with recurrent ascites who presents for an US-guided paracentesis. Current Meds:    Prior to Admission medications    Medication Sig Start Date End Date Taking? Authorizing Provider   isosorbide mononitrate ER (IMDUR) 120 mg CR tablet TAKE 1 TABLET BY MOUTH EVERY MORNING 1/2/20   Narcisa Hook NP   carvedilol (COREG) 25 mg tablet Take 1 Tab by mouth two (2) times daily (with meals). 10/22/19   Adalberto Cuenca NP   hydrALAZINE (APRESOLINE) 100 mg tablet TAKE 1 TABLET BY MOUTH THREE TIMES DAILY. 9/3/19   Magaly Mcnulty MD   furosemide (LASIX) 80 mg tablet Take 80 mg by mouth daily. Provider, Historical   doxazosin (CARDURA) 2 mg tablet TAKE ONE TABLET BY MOUTH NIGHTLY 6/5/19   Magaly Mcnulty MD   cloNIDine HCl (CATAPRES) 0.3 mg tablet Take 1 Tab by mouth three (3) times daily. 3/18/19   Adalberto Cuenca NP   sucroferric oxyhydroxide (VELPHORO PO) Take  by mouth Before breakfast, lunch, and dinner. Provider, Historical   cinacalcet (SENSIPAR) 60 mg tab Take  by mouth every Monday, Thursday, Saturday. Injection during dialysis    Provider, Historical   allopurinol (ZYLOPRIM) 100 mg tablet TAKE 1 TABLET BY MOUTH TWICE DAILY 3/15/18   Isabel Vang MD   lisinopril (PRINIVIL, ZESTRIL) 40 mg tablet TAKE 1 TABLET BY MOUTH DAILY 4/19/17   Milad Haro MD   NIFEdipine ER (PROCARDIA XL) 90 mg ER tablet Take 90 mg by mouth daily. Provider, Historical   thiamine (VITAMIN B-1) 100 mg tablet Take  by mouth daily. Provider, Historical   mupirocin calcium (BACTROBAN NASAL) 2 % nasal ointment by Both Nostrils route daily as needed. Indications: as  needed    Provider, Historical   b complex-vitamin c-folic acid (NEPHROCAPS) 1 mg capsule Take 1 Cap by mouth daily.  [Request refills from PCP (Dr. Lance Priest)] 3/31/17   Milad Haro MD   levETIRAcetam (KEPPRA) 250 mg tablet TAKE 1 TABLET BY MOUTH EVERY Monday, Wednesday, Friday AFTER [facility] DIALYSIS 12/8/16   Newton Johnson MD   levETIRAcetam (KEPPRA) 500 mg tablet TAKE 1 TABLET BY MOUTH DAILY 12/8/16   Newton Johnson MD   acetaminophen (TYLENOL) 500 mg tablet Take  by mouth every six (6) hours as needed for Pain. Provider, Historical   simethicone (PHAZYME) 180 mg cap Take  by mouth daily as needed. Provider, Historical   pantoprazole (PROTONIX) 40 mg tablet Take 1 Tab by mouth Daily (before breakfast). [Request refills from PCP (Dr. Lance Priest)] 5/8/13   Zander Torres MD   cholecalciferol (VITAMIN D3) 1,000 unit tablet Take 2 Tabs by mouth two (2) times a day. [Request refills from PCP (Dr. Lance Priest)] 5/8/13   Zander Torres MD       Allergies: Allergies   Allergen Reactions    Amlodipine Swelling    Nuts [Tree Nut] Swelling    Pcn [Penicillins] Unknown (comments)     Unsure of reaction    Phenothiazines Other (comments)     Per father, \"He has a parkinsonian reaction to this medication. \"       Comorbid Conditions:    Past Medical History:   Diagnosis Date    Anemia in chronic kidney disease(285.21) 4/24/2013    Ascites     Cardiomyopathy due to hypertension ([address] 4/24/2013    EF 20%    Chronic kidney disease     dialysis MWF at [facility] on [address]  [phone]    Edema of lower extremity 4/24/2013    End stage renal disease ([address] 04/19/2013    HD since 9/2003    Hyperphosphatemia 4/24/2013    Hypertension     Hypertension, uncontrolled 4/24/2013    Personal history of atrial flutter 4/24/2013    Pulmonary hypertension ([address] 4/24/2013    Recommendation refused by patient 4/24/2013    Patient refuses dialysis (hemodialysis or peritoneal dialysis)    Secondary hyperparathyroidism of renal origin ([address] 4/24/2013    Seizures ([address]     on Keppra, last seizure 1/2015    Sleep apnea     no cpap    Stroke [facility])     TIA 2015    Vitamin D insufficiency 4/25/2013    Vitamin D 25-Hydroxy 20.7           Past Surgical History:   Procedure Laterality Date    HX CSF SHUNT  09/2016    HX HEART CATHETERIZATION  01/2015     Data:    There were no vitals taken for this visit.:  No results for input(s): PLT, PLTEXT in the last 72 hours. No lab exists for component:  HCT  No results for input(s): INR, APTT, INREXT in the last 72 hours. No lab exists for component: PT    The H & P and/or progress notes and any available imaging were reviewed. The risks, indications and possible alternatives to the procedure, including doing nothing, were discussed and informed consent was obtained. Physical Exam:      Mental status:   Alert and oriented. Heart:   Regular rate. Lungs:  Normal respiratory effort. The patient is an appropriate candidate to undergo the planned procedure.      Adenike Shook, [address]

## 2020-03-09 NOTE — PROGRESS NOTES
Complex Case Management Date/Time:  3/9/2020 12:00 PM 
 
Method of communication with patient:phone Ambulator Care Manager (ACM) attempted to contact the patient by telephone to perform Ambulatory Care Coordination. VM left identifying self. Direct contact information given with request for return call.

## 2020-03-09 NOTE — PROGRESS NOTES
RENAL DAILY PROGRESS NOTE Patient: Lizbeth Mckeon               Sex: male          DOA: 3/9/2020  3:22 PM  
    
YOB: 1970      Age:  47 y.o.        LOS:  LOS: 0 days Subjective:  
 
Lizbeth Mckeon is a 47 y.o.  who presents with No admission diagnoses are documented for this encounter. [facility] Asked to evaluate for esrd,presented with increse swelling lower extremities,hx of non compliance with dialysis ,dopplers neg for dvt Chief complains: Patient denies nausea, vomiting, chest pain, dizziness, shortness of breath or headache. 
- Reviewed last 24 hrs events Current Facility-Administered Medications Medication Dose Route Frequency  oxyCODONE-acetaminophen (PERCOCET) 5-325 mg per tablet 2 Tab  2 Tab Oral NOW  ondansetron hcl (ZOFRAN) tablet 4 mg  4 mg Oral NOW Current Outpatient Medications Medication Sig  
 isosorbide mononitrate ER (IMDUR) 120 mg CR tablet TAKE 1 TABLET BY MOUTH EVERY MORNING  
 carvedilol (COREG) 25 mg tablet Take 1 Tab by mouth two (2) times daily (with meals).  hydrALAZINE (APRESOLINE) 100 mg tablet TAKE 1 TABLET BY MOUTH THREE TIMES DAILY.  furosemide (LASIX) 80 mg tablet Take 80 mg by mouth daily.  doxazosin (CARDURA) 2 mg tablet TAKE ONE TABLET BY MOUTH NIGHTLY  cloNIDine HCl (CATAPRES) 0.3 mg tablet Take 1 Tab by mouth three (3) times daily.  sucroferric oxyhydroxide (VELPHORO PO) Take  by mouth Before breakfast, lunch, and dinner.  cinacalcet (SENSIPAR) 60 mg tab Take  by mouth every Monday, Thursday, Saturday. Injection during dialysis  allopurinol (ZYLOPRIM) 100 mg tablet TAKE 1 TABLET BY MOUTH TWICE DAILY  lisinopril (PRINIVIL, ZESTRIL) 40 mg tablet TAKE 1 TABLET BY MOUTH DAILY  NIFEdipine ER (PROCARDIA XL) 90 mg ER tablet Take 90 mg by mouth daily.  thiamine (VITAMIN B-1) 100 mg tablet Take  by mouth daily.   
 mupirocin calcium (BACTROBAN NASAL) 2 % nasal ointment by Both Nostrils route daily as needed. Indications: as  needed  b complex-vitamin c-folic acid (NEPHROCAPS) 1 mg capsule Take 1 Cap by mouth daily. [Request refills from PCP (Dr. Mary Jo Priest)]  levETIRAcetam (KEPPRA) 250 mg tablet TAKE 1 TABLET BY MOUTH EVERY Monday, Wednesday, Friday AFTER EACH DIALYSIS  
 levETIRAcetam (KEPPRA) 500 mg tablet TAKE 1 TABLET BY MOUTH DAILY  acetaminophen (TYLENOL) 500 mg tablet Take  by mouth every six (6) hours as needed for Pain.  simethicone (PHAZYME) 180 mg cap Take  by mouth daily as needed.  pantoprazole (PROTONIX) 40 mg tablet Take 1 Tab by mouth Daily (before breakfast). [Request refills from PCP (Dr. Mary Jo Priest)]  cholecalciferol (VITAMIN D3) 1,000 unit tablet Take 2 Tabs by mouth two (2) times a day. [Request refills from PCP (Dr. Mary Jo Priest)] Objective:  
 
Visit Vitals BP (!) 200/117 (BP 1 Location: Left arm, BP Patient Position: At rest) Pulse 72 Temp 98.5 °F (36.9 °C) Resp 14 Ht 5' 11\" (1.803 m) Wt 85 kg (187 lb 6.3 oz) SpO2 96% BMI 26.14 kg/m² No intake or output data in the 24 hours ending 03/09/20 1757 Physical Examination:  
 
 
RS: Chest is bilateral equal, no wheezing / rales / crackles CVS: S1-S2 heard, RRR, No S3 / murmur Abdomen: ascites Extremities: + edema, no cyanosis, skin is warm on touch CNS: Awake & follows commands, CN II-XII are grossly intact. HEENT: Head is atraumatic, PERRLA, conjunctiva pink & non icteric. No JVD or carotid bruit Data Review:   
 
Labs:  
 
Hematology:  
Recent Labs 03/09/20 
1533 WBC 7.1 HGB 8.4* HCT 27.6* Chemistry:  
Recent Labs 03/09/20 
1533 * CREA 10.00* CA 7.9* ALB 1.7*  
K 5.9*   CO2 25 * Images: 
 
XR (Most Recent). CXR reviewed by me and compared with previous CXR Results from [facility] Encounter encounter on 02/10/17 XR FOOT RT AP/LAT Narrative Procedure:  Right foot series. Indication:  Tophi on the great toe. Comparison:  None. Findings:  Frontal and lateral views of the right foot. No convincing evidence of acute fracture or subluxation is identified. Extensive quite dense calcific debris or periarticular calcifications are 
identified around the 1st interphalangeal joint, with associated soft tissue 
prominence. Because of the quite pronounced calcific densities, the underlying 
osseous structures are difficult to evaluate confidently. There appears to be a transversely oriented irregular lucency through the 1st 
proximal phalangeal head region. Whether this is an artifact from the presence 
of extensive periarticular calcifications or this indicates the presence of 
proximal phalangeal fracture is unclear. The 1st proximal phalangeal base reveals a focus of cortical erosion along the 
tibial aspect. No definite overhanging margin is demonstrated however. Subtle 
adjacent calcifications are observed. There are additional scattered 
well-circumscribed calcific debris osseous debris around the 1st 
metatarsophalangeal joint. The 1st metatarsophalangeal joint demonstrate joint 
space narrowing. The ankle joint demonstrate markedly prominent well-circumscribed calcific or 
osseous bodies around the joint. These calcific densities are atypical for 
tophi. Extensive vascular calcifications are identified. Please note that the 
technologist did not remove the nonslip foot cover which contributed to 
difficulty in evaluating the calcifications. Impression Impression: 1. Atypical extensive calcifications or calcific bodies around the joints, 
particularly the tibiotalar joint and at the 1st interphalangeal joint. Atypical pattern and location for tophi development. These does appear to be a 
focal erosion at the 1st proximal phalangeal base. Gouty arthritis cannot be 
excluded. 2.  1st proximal phalangeal head fracture vs. artifact from presence of multiple 
dense calcific bodies around the 1st interphalangeal joint. Please correlate 
with physical exam findings. 3.  Diabetes mellitus. CT (Most Recent) Results from [facility] [place] encounter on 12/05/16 CT HEAD WO CONT Narrative CT scan of brain, without intravenous contrast enhancement: 
 
INDICATION: 
 
Seizure. History of end-stage renal disease, cardiomyopathy, hypertension, and anemia. TECHNIQUE: 
 
Contiguous 5 mm thick axial sections of brain are obtained without intravenous 
contrast. Coronal and sagittal images are reformatted. All CT scans at this facility are performed using dose optimization technique as 
appropriate to a  performed  examination, to include automated exposer control, 
adjustment mA and / or  KV according to patient size (including appropriate 
matching  for site specific examination), or use  of iterative  reconstruction 
technique. COMPARISON STUDY: CT scan of brain dated 04/19/2013. FINDINGS: 
 
 
 
No evidence of intracranial hemorrhage. There are findings of mild to moderate cortical atrophy changes in the frontal 
lobes and upper parietal lobes bilaterally. Cerebral ventricles are of normal 
size, without midline shift. In periventricular and central white matter there 
are findings of moderate chronic microvascular ischemic changes, which are more 
pronounced as compared to previous study. There is no definable focal hypodensity or focal infarction in cerebral cortex 
in either side. There is no definable focal abnormality in basal area structures of both sides, 
or in cerebellum. No definable intracranial mass lesion or mass effect. No diagnostic finding in calvarium, base of skull, visualized upper portions of 
both orbits or in visualized upper portions of paranasal sinuses.  
 
 
  
 Impression IMPRESSION: 
 
 No evidence of intracranial hemorrhages or any other definable acute 
intracranial process. EKG No results found for this or any previous visit. I have personally reviewed the old medical records and patient's labs Plan / Recommendation: 1. Esrd,fluid overload,hyperkalemia,not compliant. plan stat dialysis tonight. told him to go back to dialysis tomorrow for extra treatment. encourage compliance with dialysis ,fluid restrictions and bp meds D/w jatin Beatty MD 
Nephrology 3/9/2020

## 2020-03-09 NOTE — ED TRIAGE NOTES
The patient presents for evaluation of BLE edema. He is also complaining of intermittent shortness of breath. He received HD M/W/F. He missed it today because he came to the ED for evaluation of his edema.

## 2020-03-09 NOTE — ED NOTES
6:09 PM :Pt care assumed from Ynes Copeland, ED provider. Pt complaint(s), current treatment plan, progression and available diagnostic results have been discussed thoroughly. Rounding occurred: no Intended Disposition: Home Pending diagnostic reports and/or labs (please list): pending reassessment after dialysis, d/c home 12:38 AM: Pt resting comfortably after dialysis. Feeling much better, ready to go home. Blood pressure reduced to 174/105. Reviewed results with patient. Discussed need for close outpatient follow-up this week for reassessment. Discussed strict return precautions, including chest pain, shortness of breath, or any other medical concerns.

## 2020-03-09 NOTE — ED PROVIDER NOTES
EMERGENCY DEPARTMENT HISTORY AND PHYSICAL EXAM 
 
3:46 PM 
 
 
Date: 3/9/2020 Patient Name: Antonio Castano History of Presenting Illness Chief Complaint Patient presents with  Leg Swelling History Provided By: Patient Additional History (Context): Antonio Castano is a 47 y.o. male with a PMHX  HTN, ESRD, Pulmonary HTN, Cardiomyopathy, Edema of lower extremities, Anemia in  Chronic Kidney Disease, Hyperphosphatemia, Vitamin D Insufficiency, Ascites, CVA, Sleep Apnea, and SZ arrived to ER c/o intermittent lower leg swelling x1 month. Patient states, \"swelling comes and goes but for the past two days has gotten worse. \"  Denies calf pain. Patient verbalizes right leg more swollen than left leg. Patient also reports he began to have some mild SOB this am but states he didn't get dialysis today because he was coming to ER. Patient reports he receives dialysis three days a week Mon/Wed/Friday. Denies any recent travels. Denies any recent hospitalizations or surgeries within the past two months. Denies fever, chills, fatigue, headache, dizziness, Cp, Palpitations, abdominal pain, N/V/D, flank pain, calf pain, or any other concerns. PCP: None Chief Complaint: leg swelling Duration:  One month Timing:  Intermittent Location: bilateral lower legs Quality: Aching Severity: Moderate Modifying Factors: elevation Associated Symptoms: denies any other associated signs or symptoms Current Facility-Administered Medications Medication Dose Route Frequency Provider Last Rate Last Dose  oxyCODONE-acetaminophen (PERCOCET) 5-325 mg per tablet 2 Tab  2 Tab Oral NOW Diamond Mendoza NP      
 ondansetron hcl (ZOFRAN) tablet 4 mg  4 mg Oral NOW Katie Mendoza NP Current Outpatient Medications Medication Sig Dispense Refill  isosorbide mononitrate ER (IMDUR) 120 mg CR tablet TAKE 1 TABLET BY MOUTH EVERY MORNING 90 Tab 1  
  carvedilol (COREG) 25 mg tablet Take 1 Tab by mouth two (2) times daily (with meals). 180 Tab 3  
 hydrALAZINE (APRESOLINE) 100 mg tablet TAKE 1 TABLET BY MOUTH THREE TIMES DAILY. 270 Tab 0  
 furosemide (LASIX) 80 mg tablet Take 80 mg by mouth daily.  doxazosin (CARDURA) 2 mg tablet TAKE ONE TABLET BY MOUTH NIGHTLY 90 Tab 0  cloNIDine HCl (CATAPRES) 0.3 mg tablet Take 1 Tab by mouth three (3) times daily. 270 Tab 1  
 sucroferric oxyhydroxide (VELPHORO PO) Take  by mouth Before breakfast, lunch, and dinner.  cinacalcet (SENSIPAR) 60 mg tab Take  by mouth every Monday, Thursday, Saturday. Injection during dialysis  allopurinol (ZYLOPRIM) 100 mg tablet TAKE 1 TABLET BY MOUTH TWICE DAILY 180 Tab 0  
 lisinopril (PRINIVIL, ZESTRIL) 40 mg tablet TAKE 1 TABLET BY MOUTH DAILY 90 Tab 0  
 NIFEdipine ER (PROCARDIA XL) 90 mg ER tablet Take 90 mg by mouth daily.  thiamine (VITAMIN B-1) 100 mg tablet Take  by mouth daily.  mupirocin calcium (BACTROBAN NASAL) 2 % nasal ointment by Both Nostrils route daily as needed. Indications: as  needed  b complex-vitamin c-folic acid (NEPHROCAPS) 1 mg capsule Take 1 Cap by mouth daily. [Request refills from PCP (Dr. Anitra Priest)] 30 Cap 1  
 levETIRAcetam (KEPPRA) 250 mg tablet TAKE 1 TABLET BY MOUTH EVERY Monday, Wednesday, Friday AFTER EACH DIALYSIS 15 Tab 0  
 levETIRAcetam (KEPPRA) 500 mg tablet TAKE 1 TABLET BY MOUTH DAILY 30 Tab 0  
 acetaminophen (TYLENOL) 500 mg tablet Take  by mouth every six (6) hours as needed for Pain.  simethicone (PHAZYME) 180 mg cap Take  by mouth daily as needed.  pantoprazole (PROTONIX) 40 mg tablet Take 1 Tab by mouth Daily (before breakfast). [Request refills from PCP (Dr. Anitra Priest)] 15 Tab 0  cholecalciferol (VITAMIN D3) 1,000 unit tablet Take 2 Tabs by mouth two (2) times a day. [Request refills from PCP (Dr. Anitra Priest)] 60 Tab 0 Past History Past Medical History: Past Medical History:  
Diagnosis Date  Anemia in chronic kidney disease(285.21) 4/24/2013  Ascites  Cardiomyopathy due to hypertension ([facility] [address] 4/24/2013 EF 20%  Chronic kidney disease   
 dialysis MWF at [facility] on [address] [phone]  
 Edema of lower extremity 4/24/2013  End stage renal disease ([address] 04/19/2013 HD since 9/2003  Hyperphosphatemia 4/24/2013  Hypertension  Hypertension, uncontrolled 4/24/2013  Personal history of atrial flutter 4/24/2013  Pulmonary hypertension ([address] 4/24/2013  Recommendation refused by patient 4/24/2013 Patient refuses dialysis (hemodialysis or peritoneal dialysis)  Secondary hyperparathyroidism of renal origin ([facility] [address] 4/24/2013  Seizures ([facility] [address]   
 on Keppra, last seizure 1/2015  Sleep apnea   
 no cpap  Stroke ([facility] [address] TIA 2015  Vitamin D insufficiency 4/25/2013 Vitamin D 25-Hydroxy 20.7 Past Surgical History: 
Past Surgical History:  
Procedure Laterality Date  HX CSF SHUNT  09/2016  HX HEART CATHETERIZATION  01/2015 Family History: 
Family History Problem Relation Age of Onset  Hypertension Mother  Hypertension Father  Heart Attack Neg Hx  Stroke Neg Hx Social History: 
Social History Tobacco Use  Smoking status: Never Smoker  Smokeless tobacco: Never Used Substance Use Topics  Alcohol use: No  
 Drug use: No  
 
 
Allergies: Allergies Allergen Reactions  Amlodipine Swelling  Nuts [Tree Nut] Swelling  Pcn [Penicillins] Unknown (comments) Unsure of reaction  Phenothiazines Other (comments) Per father, Myrna Jha has a parkinsonian reaction to this medication. \" Review of Systems Review of Systems Constitutional: Negative for activity change, appetite change, chills, fatigue and fever. Respiratory: Positive for shortness of breath. Negative for cough, choking, wheezing and stridor. Cardiovascular: Positive for leg swelling. Negative for chest pain and palpitations. Gastrointestinal: Negative for abdominal distention, abdominal pain, blood in stool, constipation, diarrhea, nausea and vomiting. Musculoskeletal: Negative for back pain, neck pain and neck stiffness. Skin: Negative for rash. Neurological: Negative for dizziness, tremors, syncope, weakness, light-headedness, numbness and headaches. Psychiatric/Behavioral: Negative for sleep disturbance and suicidal ideas. Physical Exam  
 
Visit Vitals BP (!) 200/117 (BP 1 Location: Left arm, BP Patient Position: At rest) Pulse 72 Temp 98.5 °F (36.9 °C) Resp 14 Ht 5' 11\" (1.803 m) Wt 85 kg (187 lb 6.3 oz) SpO2 96% BMI 26.14 kg/m² Physical Exam 
Vitals signs and nursing note reviewed. Constitutional:   
   General: He is not in acute distress. Appearance: Normal appearance. He is not ill-appearing, toxic-appearing or diaphoretic. Cardiovascular:  
   Rate and Rhythm: Normal rate and regular rhythm. Pulses: Normal pulses. Heart sounds: Normal heart sounds. No murmur. No friction rub. No gallop. Comments: AV shunt to left forearm, positive bruit and thrill Pulmonary:  
   Effort: Pulmonary effort is normal. No respiratory distress. Breath sounds: No stridor. Examination of the right-lower field reveals rhonchi. Examination of the left-lower field reveals rhonchi. Rhonchi present. No wheezing or rales. Chest:  
   Chest wall: No tenderness. Abdominal:  
   General: Bowel sounds are normal. There is no distension. Palpations: Abdomen is soft. There is no mass. Tenderness: There is no abdominal tenderness. There is no right CVA tenderness, left CVA tenderness, guarding or rebound. Hernia: No hernia is present. Musculoskeletal: Normal range of motion. Right lower le+ Edema present. Left lower leg: 3+ Edema present.   
Skin: 
 General: Skin is warm and dry. Capillary Refill: Capillary refill takes less than 2 seconds. Neurological:  
   Mental Status: He is alert and oriented to person, place, and time. Psychiatric:     
   Attention and Perception: Attention and perception normal.     
   Mood and Affect: Mood and affect normal.     
   Speech: Speech normal.     
   Behavior: Behavior normal. Behavior is cooperative. Thought Content: Thought content normal.     
   Cognition and Memory: Cognition and memory normal.     
   Judgment: Judgment normal.  
 
 
 
 
Diagnostic Study Results Labs - Recent Results (from the past 12 hour(s)) CBC WITH AUTOMATED DIFF Collection Time: 03/09/20  3:33 PM  
Result Value Ref Range WBC 7.1 4.6 - 13.2 K/uL  
 RBC 3.10 (L) 4.70 - 5.50 M/uL HGB 8.4 (L) 13.0 - 16.0 g/dL HCT 27.6 (L) 36.0 - 48.0 % MCV 89.0 74.0 - 97.0 FL  
 MCH 27.1 24.0 - 34.0 PG  
 MCHC 30.4 (L) 31.0 - 37.0 g/dL RDW 20.3 (H) 11.6 - 14.5 % PLATELET 625 450 - 094 K/uL MPV 10.5 9.2 - 11.8 FL  
 NEUTROPHILS PENDING % LYMPHOCYTES PENDING % MONOCYTES PENDING % EOSINOPHILS PENDING % BASOPHILS PENDING %  
 ABS. NEUTROPHILS PENDING K/UL  
 ABS. LYMPHOCYTES PENDING K/UL  
 ABS. MONOCYTES PENDING K/UL  
 ABS. EOSINOPHILS PENDING K/UL  
 ABS. BASOPHILS PENDING K/UL  
 DF PENDING   
METABOLIC PANEL, COMPREHENSIVE Collection Time: 03/09/20  3:33 PM  
Result Value Ref Range Sodium 142 136 - 145 mmol/L Potassium 5.9 (H) 3.5 - 5.5 mmol/L Chloride 109 100 - 111 mmol/L  
 CO2 25 21 - 32 mmol/L Anion gap 8 3.0 - 18 mmol/L Glucose 102 (H) 74 - 99 mg/dL  (H) 7.0 - 18 MG/DL Creatinine 10.00 (H) 0.6 - 1.3 MG/DL  
 BUN/Creatinine ratio 11 (L) 12 - 20 GFR est AA 7 (L) >60 ml/min/1.73m2 GFR est non-AA 6 (L) >60 ml/min/1.73m2 Calcium 7.9 (L) 8.5 - 10.1 MG/DL  Bilirubin, total 0.6 0.2 - 1.0 MG/DL  
 ALT (SGPT) 11 (L) 16 - 61 U/L  
 AST (SGOT) 11 10 - 38 U/L  
 Alk. phosphatase 71 45 - 117 U/L Protein, total 4.6 (L) 6.4 - 8.2 g/dL Albumin 1.7 (L) 3.4 - 5.0 g/dL Globulin 2.9 2.0 - 4.0 g/dL A-G Ratio 0.6 (L) 0.8 - 1.7 Radiologic Studies - No orders to display THIS IS A PRELIMINARY RESULT Vitals Weight Height BSA (calculated - sq m) BP Pulse (Heart Rate) Interpretation Summary · No evidence of acute deep vein thrombosis in the right common femoral, superficial femoral, popliteal, posterior tibial, and peroneal veins. The veins were imaged in the transverse and longitudinal planes. The vessels showed normal color filling and compressibility. Doppler interrogation showed phasic and spontaneous flow. · No evidence of acute deep vein thrombosis in the left common femoral, superficial femoral, popliteal, posterior tibial, and peroneal veins. The veins were imaged in the transverse and longitudinal planes. The vessels showed normal color filling and compressibility. Doppler interrogation showed phasic and spontaneous flow. · Technically difficult exam due to bilateral marked edema and shadowing from arterial calcification. Lower Extremity Venous Findings Right Lower Venous No evidence of deep vein thrombosis in the common femoral, profunda femoral, superficial femoral, popliteal, posterior tibial, and peroneal veins. The veins were imaged in the transverse and longitudinal planes. The vessels showed normal color filling and compressibility. Doppler interrogation showed phasic and spontaneous flow. The right posterior tibial artery has triphasic waveforms. Left Lower Venous No evidence of deep vein thrombosis in the common femoral, profunda femoral, superficial femoral, popliteal, posterior tibial, and peroneal veins. The veins were imaged in the transverse and longitudinal planes. The vessels showed normal color filling and compressibility. Doppler interrogation showed phasic and spontaneous flow. The left posterior tibial artery has triphasic waveforms. Procedure Staff Technologist/Clinician: Nikunj Stevenson Supporting Staff: None Performing Physician/Midlevel: None 
  
Exam Completion Date/Time: 3/9/20  4:57 PM  
 
 
 
Medical Decision Making It should be noted that I, Gabriella Hyde MD , DONNA Mendoza NP, and Jorie Burkitt PA will be the provider of record for this patient. I reviewed the vital signs, available nursing notes, past medical history, past surgical history, family history and social history. Vital Signs-Reviewed the patient's vital signs. Records Reviewed: Old Medical Records (Time of Review: 3:46 PM) 
 
ED Course: Progress Notes, Reevaluation, and Consults: 
 
16:45 PM:  Consulted and discussed case with Dr. Lisbeth Blanco. Standard discussion included reason for patient ER visit, PMHX, V/S, ROS, PE and current diagnostic results. Dr. Lisbeth Blanco reports patient will require dialysis and no need to obtain chest x-ray. 17:15 PM:  Dr. Lisbeth Blanco evaluated patient. Patient also verbalizes understanding he will be getting dialysis today. 18:10 PM:  Report and care turned over to Jorie Burkitt PA. Standard discussion included reason for patient ER visit, PMHX, V/S,ROS, PE, and current diagnostic results. She is aware patient is to have dialysis and then d/c home. Provider Notes (Medical Decision Making): DDX: 
DVT Clinical Impression/Plan: 
 
Diagnosis Clinical Impression: 1. Bilateral leg edema 2. ESRD (end stage renal disease) ([facility] [address] Disposition:  
 
Follow-up Information None Patient's Medications Start Taking No medications on file Continue Taking ACETAMINOPHEN (TYLENOL) 500 MG TABLET    Take  by mouth every six (6) hours as needed for Pain. ALLOPURINOL (ZYLOPRIM) 100 MG TABLET    TAKE 1 TABLET BY MOUTH TWICE DAILY  B COMPLEX-VITAMIN C-FOLIC ACID (NEPHROCAPS) 1 MG CAPSULE    Take 1 Cap by mouth daily. [Request refills from PCP (Dr. Don Priest)] CARVEDILOL (COREG) 25 MG TABLET    Take 1 Tab by mouth two (2) times daily (with meals). CHOLECALCIFEROL (VITAMIN D3) 1,000 UNIT TABLET    Take 2 Tabs by mouth two (2) times a day. [Request refills from PCP (Dr. Don Priest)] CINACALCET (SENSIPAR) 60 MG TAB    Take  by mouth every Monday, Thursday, Saturday. Injection during dialysis CLONIDINE HCL (CATAPRES) 0.3 MG TABLET    Take 1 Tab by mouth three (3) times daily. DOXAZOSIN (CARDURA) 2 MG TABLET    TAKE ONE TABLET BY MOUTH NIGHTLY FUROSEMIDE (LASIX) 80 MG TABLET    Take 80 mg by mouth daily. HYDRALAZINE (APRESOLINE) 100 MG TABLET    TAKE 1 TABLET BY MOUTH THREE TIMES DAILY. ISOSORBIDE MONONITRATE ER (IMDUR) 120 MG CR TABLET    TAKE 1 TABLET BY MOUTH EVERY MORNING  
 LEVETIRACETAM (KEPPRA) 250 MG TABLET    TAKE 1 TABLET BY MOUTH EVERY Monday, Wednesday, Friday AFTER EACH DIALYSIS  
 LEVETIRACETAM (KEPPRA) 500 MG TABLET    TAKE 1 TABLET BY MOUTH DAILY  
 LISINOPRIL (PRINIVIL, ZESTRIL) 40 MG TABLET    TAKE 1 TABLET BY MOUTH DAILY MUPIROCIN CALCIUM (BACTROBAN NASAL) 2 % NASAL OINTMENT    by Both Nostrils route daily as needed. Indications: as  needed NIFEDIPINE ER (PROCARDIA XL) 90 MG ER TABLET    Take 90 mg by mouth daily. PANTOPRAZOLE (PROTONIX) 40 MG TABLET    Take 1 Tab by mouth Daily (before breakfast). [Request refills from PCP (Dr. Don Priest)] SIMETHICONE (PHAZYME) 180 MG CAP    Take  by mouth daily as needed. SUCROFERRIC OXYHYDROXIDE (VELPHORO PO)    Take  by mouth Before breakfast, lunch, and dinner. THIAMINE (VITAMIN B-1) 100 MG TABLET    Take  by mouth daily. These Medications have changed No medications on file Stop Taking No medications on file  
 
_______________________________

## 2020-03-10 NOTE — DISCHARGE INSTRUCTIONS
Patient Education   Take medication as prescribed. Follow-up with your primary care physician within 2 days for reassessment. Bring the results from this visit with you for their review. Return to the ED immediately for any new, worsening, or persistent symptoms. Leg and Ankle Edema: Care Instructions  Your Care Instructions  Swelling in the legs, ankles, and feet is called edema. It is common after you sit or stand for a while. Long plane flights or car rides often cause swelling in the legs and feet. You may also have swelling if you have to stand for long periods of time at your job. Problems with the veins in the legs (varicose veins) and changes in hormones can also cause swelling. Sometimes the swelling in the ankles and feet is caused by a more serious problem, such as heart failure, infection, blood clots, or liver or kidney disease. Follow-up care is a key part of your treatment and safety. Be sure to make and go to all appointments, and call your doctor if you are having problems. It's also a good idea to know your test results and keep a list of the medicines you take. How can you care for yourself at home? · If your doctor gave you medicine, take it as prescribed. Call your doctor if you think you are having a problem with your medicine. · Whenever you are resting, raise your legs up. Try to keep the swollen area higher than the level of your heart. · Take breaks from standing or sitting in one position. ? Walk around to increase the blood flow in your lower legs. ? Move your feet and ankles often while you stand, or tighten and relax your leg muscles. · Wear support stockings. Put them on in the morning, before swelling gets worse. · Eat a balanced diet. Lose weight if you need to. · Limit the amount of salt (sodium) in your diet. Salt holds fluid in the body and may increase swelling. When should you call for help? Call 911 anytime you think you may need emergency care.  For example, call if:    · You have symptoms of a blood clot in your lung (called a pulmonary embolism). These may include:  ? Sudden chest pain. ? Trouble breathing. ? Coughing up blood.    Call your doctor now or seek immediate medical care if:    · You have signs of a blood clot, such as:  ? Pain in your calf, back of the knee, thigh, or groin. ? Redness and swelling in your leg or groin.     · You have symptoms of infection, such as:  ? Increased pain, swelling, warmth, or redness. ? Red streaks or pus. ? A fever.    Watch closely for changes in your health, and be sure to contact your doctor if:    · Your swelling is getting worse.     · You have new or worsening pain in your legs.     · You do not get better as expected. Where can you learn more? Go to http://randa-pavel.info/. Enter H113 in the search box to learn more about \"Leg and Ankle Edema: Care Instructions. \"  Current as of: June 26, 2019  Content Version: 12.2  © 9369-0928 [employer]. Care instructions adapted under license by [employer] (which disclaims liability or warranty for this information). If you have questions about a medical condition or this instruction, always ask your healthcare professional. [place] [address] any warranty or liability for your use of this information. Patient Education        Kidney Dialysis: Care Instructions  Your Care Instructions    Dialysis is a process that filters wastes from the blood when your kidneys can no longer do the job. It is not a cure, but it can help you live longer and feel better. It is a lifesaving treatment when you have kidney failure. Normal kidneys work 24 hours a day to clean wastes from your blood. Your kidneys are not able to do this job, so a process called dialysis will do some of the work for your kidneys. You and your doctor will decide which type of dialysis you should have.  Peritoneal dialysis uses the lining of your belly (peritoneum) to filter your blood. You can do it at home, on a daily basis. Hemodialysis uses a man-made filter called a dialyzer to clean your blood. Most people need to go to a hospital or clinic 3 days a week for several hours each time. Sometimes hemodialysis can be done at home. It is normal to have questions about your treatment, and you have a right to know what is happening to you. Learning about dialysis can help you take an active role in your treatment. Dialysis does not cure kidney disease, but it can help you live longer and feel better. You will need to follow your diet and treatment schedule carefully. Follow-up care is a key part of your treatment and safety. Be sure to make and go to all appointments, and call your doctor if you are having problems. It's also a good idea to know your test results and keep a list of the medicines you take. What do you need to know about peritoneal dialysis? Peritoneal dialysis uses the lining of your belly (or peritoneal membrane) to filter your blood. Before you can begin peritoneal dialysis, your doctor will need to place a thin tube called a catheter in your belly. This is the dialysis access. · Peritoneal dialysis can be done at home or in any clean place. You may be able to do it while you sleep. · You can do it by yourself. You do not have to rely on help from others. · You can do it at the times you choose as long as you do the right number of treatments. · It has to be done every day of the week. · Some people find it hard to do all the required steps. · It increases your chance for a serious infection of the lining of the belly (peritoneum). What do you need to know about hemodialysis? Hemodialysis uses a man-made membrane called a dialyzer to clean your blood. You are connected to the dialyzer by tubes attached to your blood vessels.  Before you start hemodialysis, your doctor will create a site where the blood can flow in and out of your body during your dialysis sessions. This site is called the vascular access. It may be a fistula, made by connecting an artery and a vein. Or it may be a graft, which is a tube implanted under your skin. · Hemodialysis is done mainly by trained health workers who can watch for any problems. · It allows you to be in contact with other people having dialysis. This can help provide emotional support. · You can schedule your treatments in the evenings so you can keep working. · You may be able to do home hemodialysis, which gives you more control over your schedule. · It usually needs to be done on a set schedule 3 times a week. · It can cause side effects. The most common side effects are low blood pressure and muscle cramps. These can often be treated easily. · It requires needle sticks during every treatment, which bothers some people. Others get used to it and even do the needle sticks themselves. How can you care for yourself at home? · Be sure to have all of your dialysis sessions. Do not try to shorten or skip your sessions. You have a better chance of a longer and healthier life by getting your full treatment. · Your doctor or health care team will show you the steps you need to go through each day before, during, and after dialysis. Be sure to follow these steps. If you do not understand a step, talk to your team.  · Your doctor and dietitian will help you design menus that follow your diet. Be sure to follow your diet guidelines. ? You will need to limit fluids and certain foods that contain salt (sodium), potassium, and phosphorus. ? You may need to follow a heart-healthy diet to keep the fat (cholesterol) in your blood under control. ? You may need higher levels of protein in your diet. · Your doctor may recommend certain vitamins. But do not take any other medicine, including over-the-counter medicines, vitamins, and herbal products, without talking to your doctor first.  · Do not smoke. Smoking raises your risk of many health problems, including more kidney damage. If you need help quitting, talk to your doctor about stop-smoking programs and medicines. These can increase your chances of quitting for good. · Do not take ibuprofen (Advil, Motrin), naproxen (Aleve), or similar medicines, unless your doctor tells you to. These medicines may make kidney problems worse. When should you call for help? Call your doctor now or seek immediate medical care if:    · You have a fever.     · You are dizzy or lightheaded, or you feel like you may faint.     · You are confused or cannot think clearly.     · You have new or worse nausea or vomiting.     · You have new or more blood in your urine.     · You have new swelling.    Watch closely for changes in your health, and be sure to contact your doctor if:    · You do not get better as expected. Where can you learn more? Go to http://randa-pavel.info/. Enter W626 in the search box to learn more about \"Kidney Dialysis: Care Instructions. \"  Current as of: October 31, 2018  Content Version: 12.2  © 4377-8490 [employer]. Care instructions adapted under license by [employer] (which disclaims liability or warranty for this information). If you have questions about a medical condition or this instruction, always ask your healthcare professional. [place] [address] any warranty or liability for your use of this information. Patient Education        Learning About Fluid Overload  What is fluid overload? Fluid overload means that your body has too much water. The extra fluid in your body can raise your blood pressure and force your heart to work harder. It can also make it hard for you to breathe. Most of your body is made up of water. The body uses minerals like sodium and potassium to help organs such as your heart, kidneys, and liver balance how much water you need.  For example, the heart pumps blood to move water around the body. And the kidneys work to get rid of the water that the body doesn't need. Health conditions like kidney disease, heart failure, and cirrhosis can cause fluid overload. Other things can cause extra fluid to build up. IV fluids, some medicines, too much salt (sodium) from food, and certain medical treatments can sometimes cause this fluid increase. What are the symptoms? Some of the most common symptoms are:  · Gaining weight over a short period of time. · Swelling in the ankles or legs. · Shortness of breath. How is it treated? The goal of treatment is to remove the extra fluid in your body. Your treatment will depend on the cause. Your doctor may:  · Give you medicines, such as diuretics (also called \"water pills\"). They help your body get rid of the extra fluid. · Restrict your fluid or salt intake. Follow-up care is a key part of your treatment and safety. Be sure to make and go to all appointments, and call your doctor if you are having problems. It's also a good idea to know your test results and keep a list of the medicines you take. Where can you learn more? Go to http://randa-pavel.info/. Enter O110 in the search box to learn more about \"Learning About Fluid Overload. \"  Current as of: April 9, 2019  Content Version: 12.2  © 8763-9236 [employer]. Care instructions adapted under license by [employer] (which disclaims liability or warranty for this information). If you have questions about a medical condition or this instruction, always ask your healthcare professional. [place] [address] any warranty or liability for your use of this information.

## 2020-03-10 NOTE — DIALYSIS
[facility] ACUTE HEMODIALYSIS FLOW SHEET 
 
 
HEMODIALYSIS ORDERS: Physician: Cydney Benites Dialyzer: jackson   Duration: 3 hr  BFR: 400   DFR: 800 Dialysate:  Temp 36-37*C  K+   2    Ca+  2.5 Na 138 Bicarb 30 Weight: 85 kg   Patient Chart [x]     Unable to Obtain []   Dry weight/UF Goal: 8024 Access LUE AVF Needle Gauge 15 Heparin []  Bolus      Units    [] Hourly       Units    [x]None Pre BP:  205/128   Pulse:     82       Respirations: 16  Temperature:   97.8 Labs: Pre        Post:        [x] N/A Additional Orders(medications, blood products, hypotension management) [x] N/A [x] [facility] Consent Verified CATHETER ACCESS: [x]N/A   []Right   []Left   []IJ     []Fem   []chest wall  
[] First use X-ray verified     []Tunnel                [] Non Tunneled []No S/S infection  []Redness  []Drainage []Cultured []Swelling []Pain []Medical Aseptic Prep Utilized   []Dressing Changed  [] Biopatch []Clotted   [x]Patent   Flows: [x]Good  []Poor  []Reversed If access problem,  notified: []Yes    [x]N/A     
 
GRAFT/FISTULA ACCESS:  []N/A     []Right     [x]Left     [x]UE     []LE []AVG   [x]AVF        []Buttonhole    [x]Medical Aseptic Prep Utilized [x]No S/S infection  []Redness  []Drainage []Cultured []Swelling []Pain Bruit:   [x] Strong    [] Weak       Thrill :   [x] Strong    [] Weak Needle Gauge: 15  Length: 1 If access problem,  notified: []Yes     [x]N/A Please describe access if present and not used:  
            
            GENERAL ASSESSMENT:  
  
LUNGS:  Rate  SaO2% [] N/A    [x] Clear  [] Coarse  [] Crackles  [] Wheezing 
      [] Diminished     Location : []RLL   []LLL    []RUL  []RADHA Cough: []Productive  []Dry  [x]N/A   Respirations:  [x]Easy  []Labored Therapy:   [x]RA  []NC  l/min    Mask: []NRB []Venti       O2% []Ventilator  []Intubated  [] Trach  [] BiPaP CARDIAC: [x]Regular      [] Irregular   [] Pericardial Rub  [] JVD []  Monitored  [] Bedside  [] Remotely monitored [] N/A    
 
EDEMA: [] None  []Generalized  [x] Pitting [] 1    [] 2    [] 3    [x] 4 [] Facial  [] Pedal  []  UE  [x] LE  
 
SKIN:   [x] Warm  [] Hot     [] Cold   [x] Dry     [] Pale   [] Diaphoretic    
             [] Flushed  [] Jaundiced  [] Cyanotic  [] Rash  [] Weeping LOC:    [x] Alert      [x]Oriented:    [x] Person     [x] Place  [x]Time 
             [] Confused  [] Lethargic  [] Medicated  [] Non-responsive GI / ABDOMEN:  [] Flat    [] Distended    [x] Soft    [] Firm   []  Obese 
                           [] Diarrhea  [x] Bowel Sounds  [] Nausea  [] Vomiting  / URINE ASSESSMENT:[] Voiding   [x] Oliguria  [] Anuria   []  Bailey [] Incontinent    []  Incontinent Brief      []  Bathroom Privileges PAIN: [] 0 []1  []2   []3   []4   []5   []6   []7   []8   []9   [x]10 Scale 0-10  Action/Follow Up: MOBILITY:  [] Amb    [] Amb/Assist    [x] Bed    [] Wheelchair  [] Stretcher All Vitals and Treatment Details on Attached Flowsheet Hospital: [facility] Room # UN88/TY74 [] 1st Time Acute  [] Stat  [x] Routine  [] Urgent [x] Acute Room  []  Bedside  [] ICU/CCU  [] ER Isolation Precautions: There are currently no Active Isolations Special Considerations:         [] Blood Consent Verified [x]N/A ALLERGIES:  
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 \" Code Status:Prior Hepatitis Status: updated  Results in care everywhere Lab Results Component Value Date/Time  Hepatitis A, IgM NEGATIVE  05/05/2016 12:44 PM  
 Hepatitis A Abs Negative 06/19/2011 02:40 AM  
 Hepatitis B surface Ag <0.10 09/11/2016 11:28 AM  
 Hepatitis B surface Ab 91.74 09/11/2016 11:28 AM  
 Hep B Core Ab,Total Negative 06/19/2011 02:40 AM  
 Hepatitis B core, IgM NEGATIVE  05/05/2016 12:44 PM  
 Hepatitis Be Antibody NEGATIVE  06/26/2015 11:15 PM  
 Hepatitis C virus Ab 0.07 05/05/2016 12:44 PM  
   
 
            Current Labs:  
Lab Results Component Value Date/Time Sodium 142 03/09/2020 03:33 PM  
 Potassium 5.9 (H) 03/09/2020 03:33 PM  
 Chloride 109 03/09/2020 03:33 PM  
 CO2 25 03/09/2020 03:33 PM  
 Anion gap 8 03/09/2020 03:33 PM  
 Glucose 102 (H) 03/09/2020 03:33 PM  
  (H) 03/09/2020 03:33 PM  
 Creatinine 10.00 (H) 03/09/2020 03:33 PM  
 BUN/Creatinine ratio 11 (L) 03/09/2020 03:33 PM  
 GFR est AA 7 (L) 03/09/2020 03:33 PM  
 GFR est non-AA 6 (L) 03/09/2020 03:33 PM  
 Calcium 7.9 (L) 03/09/2020 03:33 PM  
  
Lab Results Component Value Date/Time WBC 7.1 03/09/2020 03:33 PM  
 Hemoglobin, POC 8.5 (L) 08/01/2019 10:55 AM  
 HGB 8.4 (L) 03/09/2020 03:33 PM  
 Hematocrit, POC 25 (L) 08/01/2019 10:55 AM  
 HCT 27.6 (L) 03/09/2020 03:33 PM  
 PLATELET 661 10/70/7158 03:33 PM  
 MCV 89.0 03/09/2020 03:33 PM  
  
  
 
                                                                         
DIET: None PRIMARY NURSE REPORT: First initial/Last name/Title Pre Dialysis: 1900     Time: Melo Needs RN   
 
EDUCATION:   
[x] Patient [] Other         Knowledge Basis: []None [x]Minimal [] Substantial  
Barriers to learning  [x]N/A  
[] Access Care     [] S&S of infection     [] Fluid Management     []K+     [x]Procedural   
[]Albumin     [] Medications     [] Tx Options     [] Transplant     [] Diet     [] Other Teaching Tools:  [x] Explain  [x] Demo  [] Handouts [] Video Patient response:   [x] Verbalized understanding  [] Teach back  [] Return demonstration [] Requires follow up Inappropriate due to         
 
 [x] Time Out/Safety Check  [x]Extracorporeal Circuit Tested for integrity RO/HEMODIALYSIS MACHINE SAFETY CHECKS  Before each treatment:    
Machine Number:                   [facility] [x] Unit Machine # 6 with centralized RO Alarm Test:  Pass time 1806 [x] RO/Machine Log Complete Temp    36*-37* Dialysate: pH  7.4 Conductivity: Meter   14     HD Machine   13.8                  TCD: 13.9 Dialyzer Lot # [id number]            Blood Tubing Lot # [id number]          Saline Lot #  [id number] CHLORINE TESTING-Before each treatment and every 4 hours Total Chlorine: [x] less than 0.1 ppm  Time: 1700 4 Hr/2nd Check Time: 2100  
(if greater than 0.1 ppm from Primary then every 30 minutes from Secondary) TREATMENT INITIATION  with Dialysis Precautions:  
[x] All Connections Secured                 [x] Saline Line Double Clamped  
[x] Venous Parameters Set                  [x] Arterial Parameters Set [x] Prime Given 250ml                          [x]Air Foam Detector Engaged Treatment Initiation Note: Pt arrived to unit stable condition. A&Ox4 in no noted distress. BLE noted with 4+ pitting edema. LUE AVF postive bruit/thrill. HD initiated without difficulty. During Treatment Note:  LUE AVF and HD lines visible. PT watching TV. 
 2134 Pt c/o pain 7/10. Given one tab percocet 5/325. One tab fell to floor and subsequently wasted. Awaiting additional pill from pharmacy at this time. 2238 Replacement percocet tab 5/325 arrived from pharmacy and given to  Elvin Wilfrido. Medication Dose Volume Route Time [facility] name Title  
percocet 5/325 1 PO 2134 Kenny Ibarra   
percocet 5/325 1 PO 2238 Kenny Ibarra RN Post Assessment:  
Dialyzer Cleared: [] Good [x] Fair  [] Poor Blood processed:  60.6 L 
UF Removed 3000 Ml POst BP:   181/144       Pulse: 89       
 Respirations: 16  Temperature: 98.2 Lungs: 
 
 [x] Clear      [] Course         [] Crackles  
 [] Wheezing         [] Diminished Post Tx Vascular Access: AVF/AVG: Bleeding stopped Art 10 min. Gurmeet. 10 Min   Cardiac:  
[x] Regular   [] Irregular   [] Monitor  [] N/A Rhythm:      
Catheter: N/A Skin:   Pain:   
[x] Warm  [x] Dry [] Diaphoretic    [] Flushed   
[] Pale [] Cyanotic []0  []1  []2   []3  []4   []5   []6   []7   [x]8   []9   []10 Post Treatment Note: HD well tolerated. 3 L UF removed. NAD noted during or post treatment POST TREATMENT PRIMARY NURSE HANDOFF REPORT:  
 
First initial/Last name/Title Post Dialysis: Katja Mcneal RNTime:  8178 Abbreviations: AVG-arterial venous graft, AVF-arterial venous fistula, IJ-Internal Jugular, Subcl-Subclavian, Fem-Femoral, Tx-treatment, AP/HR-apical heart rate, DFR-dialysate flow rate, BFR-blood flow rate, AP-arterial pressure, -venous pressure, UF-ultrafiltrate, TMP-transmembrane pressure, Gurmeet-Venous, Art-Arterial, RO-Reverse Osmosis

## 2020-04-16 NOTE — PROGRESS NOTES
Patient states he has had sob for a couple of years. States he would like to be evaluated for supplemental oxygen. Informed patient will need to have in office visit. Appointment was scheduled for 4/17/2020 @ 6998.

## 2020-04-17 NOTE — PATIENT INSTRUCTIONS
Leg and Ankle Edema: Care Instructions Your Care Instructions Swelling in the legs, ankles, and feet is called edema. It is common after you sit or stand for a while. Long plane flights or car rides often cause swelling in the legs and feet. You may also have swelling if you have to stand for long periods of time at your job. Problems with the veins in the legs (varicose veins) and changes in hormones can also cause swelling. Sometimes the swelling in the ankles and feet is caused by a more serious problem, such as heart failure, infection, blood clots, or liver or kidney disease. Follow-up care is a key part of your treatment and safety. Be sure to make and go to all appointments, and call your doctor if you are having problems. It's also a good idea to know your test results and keep a list of the medicines you take. How can you care for yourself at home? · If your doctor gave you medicine, take it as prescribed. Call your doctor if you think you are having a problem with your medicine. · Whenever you are resting, raise your legs up. Try to keep the swollen area higher than the level of your heart. · Take breaks from standing or sitting in one position. ? Walk around to increase the blood flow in your lower legs. ? Move your feet and ankles often while you stand, or tighten and relax your leg muscles. · Wear support stockings. Put them on in the morning, before swelling gets worse. · Eat a balanced diet. Lose weight if you need to. · Limit the amount of salt (sodium) in your diet. Salt holds fluid in the body and may increase swelling. When should you call for help? Call 911 anytime you think you may need emergency care. For example, call if: 
  · You have symptoms of a blood clot in your lung (called a pulmonary embolism). These may include: 
? Sudden chest pain. ? Trouble breathing. ? Coughing up blood.  
 Call your doctor now or seek immediate medical care if:   · You have signs of a blood clot, such as: 
? Pain in your calf, back of the knee, thigh, or groin. ? Redness and swelling in your leg or groin.  
  · You have symptoms of infection, such as: 
? Increased pain, swelling, warmth, or redness. ? Red streaks or pus. ? A fever.  
 Watch closely for changes in your health, and be sure to contact your doctor if: 
  · Your swelling is getting worse.  
  · You have new or worsening pain in your legs.  
  · You do not get better as expected. Where can you learn more? Go to http://randa-pavel.info/ Enter D071 in the search box to learn more about \"Leg and Ankle Edema: Care Instructions. \" Current as of: June 26, 2019Content Version: 12.4 © 7685-3701 [employer]. Care instructions adapted under license by [employer] (which disclaims liability or warranty for this information). If you have questions about a medical condition or this instruction, always ask your healthcare professional. [place] [address] any warranty or liability for your use of this information. Shortness of Breath: Care Instructions Your Care Instructions Shortness of breath has many causes. Sometimes conditions such as anxiety can lead to shortness of breath. Some people get mild shortness of breath when they exercise. Trouble breathing also can be a symptom of a serious problem, such as asthma, lung disease, emphysema, heart problems, and pneumonia. If your shortness of breath continues, you may need tests and treatment. Watch for any changes in your breathing and other symptoms. Follow-up care is a key part of your treatment and safety. Be sure to make and go to all appointments, and call your doctor if you are having problems. It's also a good idea to know your test results and keep a list of the medicines you take. How can you care for yourself at home? · Do not smoke or allow others to smoke around you.  If you need help quitting, talk to your doctor about stop-smoking programs and medicines. These can increase your chances of quitting for good. · Get plenty of rest and sleep. · Take your medicines exactly as prescribed. Call your doctor if you think you are having a problem with your medicine. · Find healthy ways to deal with stress. ? Exercise daily. ? Get plenty of sleep. ? Eat regularly and well. When should you call for help? Call 911 anytime you think you may need emergency care. For example, call if: 
  · You have severe shortness of breath.  
  · You have symptoms of a heart attack. These may include: 
? Chest pain or pressure, or a strange feeling in the chest. 
? Sweating. ? Shortness of breath. ? Nausea or vomiting. ? Pain, pressure, or a strange feeling in the back, neck, jaw, or upper belly or in one or both shoulders or arms. ? Lightheadedness or sudden weakness. ? A fast or irregular heartbeat. After you call  911, the  may tell you to chew 1 adult-strength or 2 to 4 low-dose aspirin. Wait for an ambulance. Do not try to drive yourself.  
 Call your doctor now or seek immediate medical care if: 
  · Your shortness of breath gets worse or you start to wheeze. Wheezing is a high-pitched sound when you breathe.  
  · You wake up at night out of breath or have to prop your head up on several pillows to breathe.  
  · You are short of breath after only light activity or while at rest.  
 Watch closely for changes in your health, and be sure to contact your doctor if: 
  · You do not get better over the next 1 to 2 days. Where can you learn more? Go to http://randa-pavel.info/ Enter S780 in the search box to learn more about \"Shortness of Breath: Care Instructions. \" Current as of: June 9, 2019Content Version: 12.4 © 0202-4323 [employer].  
Care instructions adapted under license by [employer] (which disclaims liability or warranty for this information). If you have questions about a medical condition or this instruction, always ask your healthcare professional. [address] any warranty or liability for your use of this information.

## 2020-04-17 NOTE — PROGRESS NOTES
HISTORY OF PRESENT ILLNESS Harjeet Winter is a 47 y.o. male. Patient states he has been experiencing SOB with exertion and when his blood pressure is elevated. Reports if his systolic is 303 or less he doesn't feel sob. Comments he has never been evaluated by pulmonary in the past.  States he would like to be evaluated for oxygen. Reports he does use oxygen on dialysis days with improvement. Reports he has increased lower leg edema since Monday. He is currently on lasix 40 mg daily, decreased by his nephrologist NP in the fall per patient. Comments when he was taking lasix 80 mg and bumex swelling was better. Patient is scheduled to have dialysis treatment today. 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 \" Current Outpatient Medications Medication Sig Dispense Refill  isosorbide mononitrate ER (IMDUR) 120 mg CR tablet TAKE 1 TABLET BY MOUTH EVERY MORNING 90 Tab 1  carvedilol (COREG) 25 mg tablet Take 1 Tab by mouth two (2) times daily (with meals). 180 Tab 3  
 hydrALAZINE (APRESOLINE) 100 mg tablet TAKE 1 TABLET BY MOUTH THREE TIMES DAILY. 270 Tab 0  
 furosemide (LASIX) 80 mg tablet Take 80 mg by mouth daily.  doxazosin (CARDURA) 2 mg tablet TAKE ONE TABLET BY MOUTH NIGHTLY 90 Tab 0  cloNIDine HCl (CATAPRES) 0.3 mg tablet Take 1 Tab by mouth three (3) times daily. 270 Tab 1  
 sucroferric oxyhydroxide (VELPHORO PO) Take  by mouth Before breakfast, lunch, and dinner.  cinacalcet (SENSIPAR) 60 mg tab Take  by mouth every Monday, Thursday, Saturday. Injection during dialysis  allopurinol (ZYLOPRIM) 100 mg tablet TAKE 1 TABLET BY MOUTH TWICE DAILY 180 Tab 0  
 lisinopril (PRINIVIL, ZESTRIL) 40 mg tablet TAKE 1 TABLET BY MOUTH DAILY 90 Tab 0  
  NIFEdipine ER (PROCARDIA XL) 90 mg ER tablet Take 90 mg by mouth daily.  thiamine (VITAMIN B-1) 100 mg tablet Take  by mouth daily.  mupirocin calcium (BACTROBAN NASAL) 2 % nasal ointment by Both Nostrils route daily as needed. Indications: as  needed  b complex-vitamin c-folic acid (NEPHROCAPS) 1 mg capsule Take 1 Cap by mouth daily. [Request refills from PCP (Dr. New Priest)] 30 Cap 1  
 levETIRAcetam (KEPPRA) 250 mg tablet TAKE 1 TABLET BY MOUTH EVERY Monday, Wednesday, Friday AFTER EACH DIALYSIS 15 Tab 0  
 levETIRAcetam (KEPPRA) 500 mg tablet TAKE 1 TABLET BY MOUTH DAILY 30 Tab 0  
 acetaminophen (TYLENOL) 500 mg tablet Take  by mouth every six (6) hours as needed for Pain.  simethicone (PHAZYME) 180 mg cap Take  by mouth daily as needed.  pantoprazole (PROTONIX) 40 mg tablet Take 1 Tab by mouth Daily (before breakfast). [Request refills from PCP (Dr. New Priest)] 15 Tab 0  cholecalciferol (VITAMIN D3) 1,000 unit tablet Take 2 Tabs by mouth two (2) times a day. [Request refills from PCP (Dr. Nwe Priest)] 60 Tab 0 Past Medical History:  
Diagnosis Date  Anemia in chronic kidney disease(285.21) 4/24/2013  Ascites  Cardiomyopathy due to hypertension ([facility] [address] 4/24/2013 EF 20%  Chronic kidney disease   
 dialysis MWF at [facility] [address] [phone]  
 Edema of lower extremity 4/24/2013  End stage renal disease ([facility] [address] 04/19/2013 HD since 9/2003  Hyperphosphatemia 4/24/2013  Hypertension  Hypertension, uncontrolled 4/24/2013  Personal history of atrial flutter 4/24/2013  Pulmonary hypertension ([address] 4/24/2013  Recommendation refused by patient 4/24/2013 Patient refuses dialysis (hemodialysis or peritoneal dialysis)  Secondary hyperparathyroidism of renal origin ([address] 4/24/2013  Seizures ([facility] [address]   
 on Keppra, last seizure 1/2015  Sleep apnea   
 no cpap  Stroke ([facility] [address] TIA 2015  Vitamin D insufficiency 4/25/2013 Vitamin D 25-Hydroxy 20.7 Social History Socioeconomic History  Marital status: SINGLE Spouse name: Not on file  Number of children: Not on file  Years of education: Not on file  Highest education level: Not on file Occupational History  Not on file Social Needs  Financial resource strain: Not on file  Food insecurity Worry: Not on file Inability: Not on file  Transportation needs Medical: Not on file Non-medical: Not on file Tobacco Use  Smoking status: Never Smoker  Smokeless tobacco: Never Used Substance and Sexual Activity  Alcohol use: No  
 Drug use: No  
 Sexual activity: Never Lifestyle  Physical activity Days per week: Not on file Minutes per session: Not on file  Stress: Not on file Relationships  Social connections Talks on phone: Not on file Gets together: Not on file Attends [employer]ism service: Not on file Active member of club or organization: Not on file Attends meetings of clubs or organizations: Not on file Relationship status: Not on file  Intimate partner violence Fear of current or ex partner: Not on file Emotionally abused: Not on file Physically abused: Not on file Forced sexual activity: Not on file Other Topics Concern  Not on file Social History Narrative  Not on file Wt Readings from Last 3 Encounters:  
04/17/20 186 lb (84.4 kg) 03/09/20 187 lb 6.3 oz (85 kg)  
11/21/19 189 lb (85.7 kg) BP Readings from Last 3 Encounters:  
04/17/20 (!) 210/100  
03/10/20 (!) 174/105  
12/13/19 171/90 Review of Systems Constitutional: Negative for chills and fever. Respiratory: Positive for cough and shortness of breath. Cardiovascular: Positive for chest pain (when BP is elevated) and leg swelling. Negative for palpitations.   
  
BP (!) 210/100   Pulse (!) 116   Temp 96.2 °F (35.7 °C) (Oral)   Resp 18   Ht 5' 11\" (1.803 m)   Wt 186 lb (84.4 kg)   SpO2 100%   BMI 25.94 kg/m² Physical Exam 
Constitutional:   
   Appearance: He is well-developed. HENT:  
   Head: Normocephalic and atraumatic. Neck: Musculoskeletal: Normal range of motion and neck supple. Cardiovascular:  
   Rate and Rhythm: Normal rate and regular rhythm. Heart sounds: Normal heart sounds. No murmur. No friction rub. No gallop. Pulmonary:  
   Effort: Pulmonary effort is normal.  
   Breath sounds: Normal breath sounds. No wheezing, rhonchi or rales. Musculoskeletal:  
   Right lower le+ Edema present. Left lower le+ Edema present. Skin: 
   General: Skin is warm and dry. Neurological:  
   Mental Status: He is alert and oriented to person, place, and time. ASSESSMENT and PLAN 
  ICD-10-CM ICD-9-CM 1. SOB (shortness of breath) R06.02 786.05 REFERRAL TO PULMONARY DISEASE  
   NT-PRO BNP  
   METABOLIC PANEL, COMPREHENSIVE 2. Lower leg edema R60.0 782.3 Orders Placed This Encounter  NT-PRO BNP  METABOLIC PANEL, COMPREHENSIVE Tien steve Pulmonary Ref [facility]  triamcinolone (ARISTOCORT) 0.5 % topical cream  
 
 
alarm signs when to seek emergent care provided and reviewed *called dialysis center regarding patient's swelling. Spoke with nurse and informed patient is very non compliant with dialysis treatment and also diet. Reports patient comes to dialysis 1-2 times per week and does not run for his entire time. Patient has been on dialysis for a total of 20 yrs. I have discussed the diagnosis with the patient and the intended plan as seen in the above orders. The patient has received an after-visit summary and questions were answered concerning future plans. I have discussed medication side effects and warnings with the patient as well. Patient agreeable with above plan and verbalizes understanding. Follow-up and Dispositions · Return in about 3 weeks (around 5/8/2020) for edema/virtual visit.

## 2020-04-17 NOTE — PROGRESS NOTES
Mayela Vargas is a  47 y.o. male presents today for office visit for routine follow up. Patient states no change in meds Patient is currently on 2 liters of oxygen. 1. Have you been to the ER, urgent care clinic or hospitalized since your last visit? NO 
 
2. Have you seen or consulted any other health care providers outside of the [address] [place] [address] since your last visit (Include any pap smears or colon screening)? NO

## 2020-05-06 NOTE — TELEPHONE ENCOUNTER
Spoke with the mother states patient is having bilateral lower leg swelling. States swelling is somewhat worsening but is an ongoing issue. Legs are weeping clear drainage. Dressing changes performed on extremities 1-2 times daily. Patient is compliant with medication. Has been more compliant with dialysis. Did however miss one day last week. Will discuss with the provider and return call. She verbalized understanding.

## 2020-05-06 NOTE — TELEPHONE ENCOUNTER
Spoke with mother. States she has already given details to male nurse at the office.  Will await documentation

## 2020-05-06 NOTE — TELEPHONE ENCOUNTER
Pt's mother, Crystal Triplett, requesting a callback to schedule an in office visit. Ms. Kelly Dunham stated patient has oozing out of area from dialysis. Other concerns  Joint knee pain  Left hip pain  Feet swelling  Gout flareup    Ms. Ríos informed information will be sent over to provider.

## 2020-05-06 NOTE — TELEPHONE ENCOUNTER
Per provider patient will need to schedule a virtual appointment for this. Mother is aware and will have patient call to set up appointment.

## 2020-05-29 NOTE — ED TRIAGE NOTES
Patient to triage with swelling and infection of both legs. The pt as referred to the ED by Kleber Odell, nephrologist. Symptoms started April. Pt denies fever/chills and N/V/D.  Pt goes to dialysis M,W,F.

## 2020-05-29 NOTE — ED PROVIDER NOTES
EMERGENCY DEPARTMENT HISTORY AND PHYSICAL EXAM 
 
7:39 PM 
 
 
Date: 5/29/2020 Patient Name: Lee Ann Awan History of Presenting Illness Chief Complaint Patient presents with  Leg Swelling History Provided By: Patient Location/Duration/Severity/Modifying factors Patient is a 59-year-old male with a history of end-stage renal Friday dialysis who was recently treated with doxycycline for an ulcer with signs of cellulitis. This happened Monday where erythema was noted and it was noted to be tender to palpation on the left anterior lower leg, was seen at [facility] and given doxycycline. Since then the erythema has succeeded, he has been afebrile but he has noted serous drainage from the ulcer. Otherwise no nausea vomiting diarrhea, denies fevers chills and states he also takes topical antibiotics prescribed by his PCM. He states he has a follow-up appointment with his PCM on Monday already scheduled. PCP: None Current Outpatient Medications Medication Sig Dispense Refill  isosorbide mononitrate ER (IMDUR) 120 mg CR tablet TAKE 1 TABLET BY MOUTH EVERY MORNING 90 Tab 1  
 triamcinolone (ARISTOCORT) 0.5 % topical cream Apply  to affected area two (2) times a day. use thin layer 15 g 0  
 carvedilol (COREG) 25 mg tablet Take 1 Tab by mouth two (2) times daily (with meals). 180 Tab 3  
 hydrALAZINE (APRESOLINE) 100 mg tablet TAKE 1 TABLET BY MOUTH THREE TIMES DAILY. 270 Tab 0  
 furosemide (LASIX) 80 mg tablet Take 40 mg by mouth daily.  doxazosin (CARDURA) 2 mg tablet TAKE ONE TABLET BY MOUTH NIGHTLY 90 Tab 0  cloNIDine HCl (CATAPRES) 0.3 mg tablet Take 1 Tab by mouth three (3) times daily. 270 Tab 1  
 sucroferric oxyhydroxide (VELPHORO PO) Take  by mouth Before breakfast, lunch, and dinner.  cinacalcet (SENSIPAR) 60 mg tab Take  by mouth every Monday, Thursday, Saturday. Injection during dialysis  allopurinol (ZYLOPRIM) 100 mg tablet TAKE 1 TABLET BY MOUTH TWICE DAILY 180 Tab 0  
 lisinopril (PRINIVIL, ZESTRIL) 40 mg tablet TAKE 1 TABLET BY MOUTH DAILY 90 Tab 0  
 NIFEdipine ER (PROCARDIA XL) 90 mg ER tablet Take 90 mg by mouth daily.  thiamine (VITAMIN B-1) 100 mg tablet Take  by mouth daily.  mupirocin calcium (BACTROBAN NASAL) 2 % nasal ointment by Both Nostrils route daily as needed. Indications: as  needed  b complex-vitamin c-folic acid (NEPHROCAPS) 1 mg capsule Take 1 Cap by mouth daily. [Request refills from PCP (Dr. Faith Priest)] 30 Cap 1  
 levETIRAcetam (KEPPRA) 250 mg tablet TAKE 1 TABLET BY MOUTH EVERY Monday, Wednesday, Friday AFTER EACH DIALYSIS 15 Tab 0  
 levETIRAcetam (KEPPRA) 500 mg tablet TAKE 1 TABLET BY MOUTH DAILY 30 Tab 0  
 acetaminophen (TYLENOL) 500 mg tablet Take  by mouth every six (6) hours as needed for Pain.  simethicone (PHAZYME) 180 mg cap Take  by mouth daily as needed.  pantoprazole (PROTONIX) 40 mg tablet Take 1 Tab by mouth Daily (before breakfast). [Request refills from PCP (Dr. Faith Priest)] 15 Tab 0  cholecalciferol (VITAMIN D3) 1,000 unit tablet Take 2 Tabs by mouth two (2) times a day. [Request refills from PCP (Dr. Faith Priest)] 60 Tab 0 Past History Past Medical History: 
Past Medical History:  
Diagnosis Date  Anemia in chronic kidney disease(285.21) 4/24/2013  Ascites  Cardiomyopathy due to hypertension ([facility] [address] 4/24/2013 EF 20%  Chronic kidney disease   
 dialysis MWF at [facility] [address] [phone]  
 Edema of lower extremity 4/24/2013  End stage renal disease ([facility] [address] 04/19/2013 HD since 9/2003  Hyperphosphatemia 4/24/2013  Hypertension  Hypertension, uncontrolled 4/24/2013  Personal history of atrial flutter 4/24/2013  Pulmonary hypertension ([facility] [address] 4/24/2013  Recommendation refused by patient 4/24/2013 Patient refuses dialysis (hemodialysis or peritoneal dialysis)  Secondary hyperparathyroidism of renal origin ([facility] [address] 4/24/2013  Seizures ([facility] [address]   
 on Keppra, last seizure 1/2015  Sleep apnea   
 no cpap  Stroke ([facility] [address] TIA 2015  Vitamin D insufficiency 4/25/2013 Vitamin D 25-Hydroxy 20.7 Past Surgical History: 
Past Surgical History:  
Procedure Laterality Date  HX CSF SHUNT  09/2016  HX HEART CATHETERIZATION  01/2015 Family History: 
Family History Problem Relation Age of Onset  Hypertension Mother  Hypertension Father  Heart Attack Neg Hx  Stroke Neg Hx Social History: 
Social History Tobacco Use  Smoking status: Never Smoker  Smokeless tobacco: Never Used Substance Use Topics  Alcohol use: No  
 Drug use: No  
 
 
Allergies: Allergies Allergen Reactions  Amlodipine Swelling  Nuts [Tree Nut] Swelling  Pcn [Penicillins] Unknown (comments) Unsure of reaction  Phenothiazines Other (comments) Per father, Sukhdeep Castano has a parkinsonian reaction to this medication. \" Review of Systems Review of Systems Constitutional: Negative for chills, diaphoresis, fatigue and fever. HENT: Negative for congestion, facial swelling, postnasal drip and rhinorrhea. Respiratory: Negative for cough, chest tightness and shortness of breath. Cardiovascular: Positive for leg swelling. Negative for chest pain and palpitations. Gastrointestinal: Negative for abdominal distention, blood in stool, constipation, diarrhea, nausea and vomiting. Skin: Positive for wound. Negative for color change and rash. Neurological: Negative for dizziness, tremors, seizures, light-headedness and headaches. Psychiatric/Behavioral: Negative for agitation and confusion. Physical Exam  
 
Visit Vitals BP (!) 205/108 (BP 1 Location: Left arm, BP Patient Position: Sitting) Comment: pt stated he has not taken his 2nd dose of BP meds today Pulse 80 Temp 98.5 °F (36.9 °C) Resp 16 Ht 5' 11\" (1.803 m) Wt 84.8 kg (187 lb) SpO2 100% BMI 26.08 kg/m² Physical Exam 
Constitutional:   
   General: He is not in acute distress. Appearance: Normal appearance. He is not ill-appearing or diaphoretic. HENT:  
   Head: Normocephalic and atraumatic. Nose: Nose normal.  
   Mouth/Throat:  
   Mouth: Mucous membranes are moist.  
   Pharynx: Oropharynx is clear. Eyes:  
   General: No scleral icterus. Conjunctiva/sclera: Conjunctivae normal.  
   Pupils: Pupils are equal, round, and reactive to light. Cardiovascular:  
   Rate and Rhythm: Normal rate and regular rhythm. Pulses: Normal pulses. Heart sounds: Normal heart sounds. Pulmonary:  
   Effort: Pulmonary effort is normal.  
   Breath sounds: Normal breath sounds. Abdominal:  
   General: Abdomen is flat. Tenderness: There is no abdominal tenderness. There is no guarding or rebound. Skin: 
   General: Skin is warm. Capillary Refill: Capillary refill takes less than 2 seconds. Neurological:  
   Mental Status: He is alert and oriented to person, place, and time. Mental status is at baseline. Psychiatric:     
   Mood and Affect: Mood normal.     
   Behavior: Behavior normal.  
 
 
 
 
Diagnostic Study Results Labs - Recent Results (from the past 12 hour(s)) METABOLIC PANEL, COMPREHENSIVE Collection Time: 05/29/20  6:56 PM  
Result Value Ref Range Sodium 143 136 - 145 mmol/L Potassium 4.2 3.5 - 5.5 mmol/L Chloride 106 100 - 111 mmol/L  
 CO2 32 21 - 32 mmol/L Anion gap 5 3.0 - 18 mmol/L Glucose 127 (H) 74 - 99 mg/dL BUN 35 (H) 7.0 - 18 MG/DL Creatinine 5.00 (H) 0.6 - 1.3 MG/DL  
 BUN/Creatinine ratio 7 (L) 12 - 20 GFR est AA 15 (L) >60 ml/min/1.73m2 GFR est non-AA 12 (L) >60 ml/min/1.73m2 Calcium 9.7 8.5 - 10.1 MG/DL  Bilirubin, total 0.4 0.2 - 1.0 MG/DL  
 ALT (SGPT) 15 (L) 16 - 61 U/L  
 AST (SGOT) 16 10 - 38 U/L Alk. phosphatase 85 45 - 117 U/L Protein, total 6.2 (L) 6.4 - 8.2 g/dL Albumin 2.1 (L) 3.4 - 5.0 g/dL Globulin 4.1 (H) 2.0 - 4.0 g/dL A-G Ratio 0.5 (L) 0.8 - 1.7    
CBC WITH AUTOMATED DIFF Collection Time: 05/29/20  6:56 PM  
Result Value Ref Range WBC 8.3 4.6 - 13.2 K/uL  
 RBC 3.99 (L) 4.70 - 5.50 M/uL  
 HGB 11.0 (L) 13.0 - 16.0 g/dL HCT 35.6 (L) 36.0 - 48.0 % MCV 89.2 74.0 - 97.0 FL  
 MCH 27.6 24.0 - 34.0 PG  
 MCHC 30.9 (L) 31.0 - 37.0 g/dL  
 RDW 18.5 (H) 11.6 - 14.5 % PLATELET 641 738 - 926 K/uL MPV 12.3 (H) 9.2 - 11.8 FL  
 NEUTROPHILS PENDING % LYMPHOCYTES PENDING % MONOCYTES PENDING % EOSINOPHILS PENDING % BASOPHILS PENDING %  
 ABS. NEUTROPHILS PENDING K/UL  
 ABS. LYMPHOCYTES PENDING K/UL  
 ABS. MONOCYTES PENDING K/UL  
 ABS. EOSINOPHILS PENDING K/UL  
 ABS. BASOPHILS PENDING K/UL  
 DF PENDING Radiologic Studies - No orders to display Medical Decision Making I am the first provider for this patient. I reviewed the vital signs, available nursing notes, past medical history, past surgical history, family history and social history. Vital Signs-Reviewed the patient's vital signs. EKG: none obtained Records Reviewed: Old Medical Records (Time of Review: 7:39 PM) ED Course: Progress Notes, Reevaluation, and Consults: 
 
 Ulcer evaluated with Dr. Maged Parker, 4 x 4 gauze placed over the ulcer and wrapped with Ace bandage. Patient found to be hypertensive during admission, stated he had not had his blood pressure medication for dialysis but had it in his car. Provider Notes (Medical Decision Making): MDM Number of Diagnoses or Management Options Hypertension, unspecified type:  
Venous stasis:  
Diagnosis management comments: Patient with bilateral lower extremity edema with known ulceration.   No signs and symptoms of infection, seems to be improving from what patient states. No erythema no purulent discharge, mild serous drainage consistent with edema/possible venous stasis disease. Patient instructed to follow-up with primary care to see if wound care would be appropriate. Patient was instructed to continue his course of doxycycline, topical antibiotics scribed. Patient state he already had scheduled follow-up Monday with his primary care. Patient was amenable to plan felt comfortable being discharged. Patient was hypertensive but had not taken his blood pressure medication after dialysis this morning. He stated he had his medication his car, had a pretty strict regiment and stated he would take it as soon as he got home. He stated that he knew the signs and symptoms of high blood pressure if he were to become symptomatic, and stated he would return should his blood pressure not respond to his at home medications. Procedures Diagnosis Clinical Impression: 1. Venous stasis 2. Hypertension, unspecified type Disposition: discharged Follow-up Information Follow up With Specialties Details Why Contact Info Primary care physician  In 3 days Already scheduled, to discuss need for wound care further intervention Patient's Medications Start Taking No medications on file Continue Taking ACETAMINOPHEN (TYLENOL) 500 MG TABLET    Take  by mouth every six (6) hours as needed for Pain. ALLOPURINOL (ZYLOPRIM) 100 MG TABLET    TAKE 1 TABLET BY MOUTH TWICE DAILY B COMPLEX-VITAMIN C-FOLIC ACID (NEPHROCAPS) 1 MG CAPSULE    Take 1 Cap by mouth daily. [Request refills from PCP (Dr. Rolo Priest)] CARVEDILOL (COREG) 25 MG TABLET    Take 1 Tab by mouth two (2) times daily (with meals). CHOLECALCIFEROL (VITAMIN D3) 1,000 UNIT TABLET    Take 2 Tabs by mouth two (2) times a day. [Request refills from PCP (Dr. Rolo Priest)] CINACALCET (SENSIPAR) 60 MG TAB    Take  by mouth every Monday, Thursday, Saturday. Injection during dialysis CLONIDINE HCL (CATAPRES) 0.3 MG TABLET    Take 1 Tab by mouth three (3) times daily. DOXAZOSIN (CARDURA) 2 MG TABLET    TAKE ONE TABLET BY MOUTH NIGHTLY FUROSEMIDE (LASIX) 80 MG TABLET    Take 40 mg by mouth daily. HYDRALAZINE (APRESOLINE) 100 MG TABLET    TAKE 1 TABLET BY MOUTH THREE TIMES DAILY. ISOSORBIDE MONONITRATE ER (IMDUR) 120 MG CR TABLET    TAKE 1 TABLET BY MOUTH EVERY MORNING  
 LEVETIRACETAM (KEPPRA) 250 MG TABLET    TAKE 1 TABLET BY MOUTH EVERY Monday, Wednesday, Friday AFTER EACH DIALYSIS  
 LEVETIRACETAM (KEPPRA) 500 MG TABLET    TAKE 1 TABLET BY MOUTH DAILY  
 LISINOPRIL (PRINIVIL, ZESTRIL) 40 MG TABLET    TAKE 1 TABLET BY MOUTH DAILY MUPIROCIN CALCIUM (BACTROBAN NASAL) 2 % NASAL OINTMENT    by Both Nostrils route daily as needed. Indications: as  needed NIFEDIPINE ER (PROCARDIA XL) 90 MG ER TABLET    Take 90 mg by mouth daily. PANTOPRAZOLE (PROTONIX) 40 MG TABLET    Take 1 Tab by mouth Daily (before breakfast). [Request refills from PCP (Dr. Esther Priest)] SIMETHICONE (PHAZYME) 180 MG CAP    Take  by mouth daily as needed. SUCROFERRIC OXYHYDROXIDE (VELPHORO PO)    Take  by mouth Before breakfast, lunch, and dinner. THIAMINE (VITAMIN B-1) 100 MG TABLET    Take  by mouth daily. TRIAMCINOLONE (ARISTOCORT) 0.5 % TOPICAL CREAM    Apply  to affected area two (2) times a day. use thin layer These Medications have changed No medications on file Stop Taking No medications on file Disclaimer: Sections of this note are dictated using utilizing voice recognition software. Minor typographical errors may be present. If questions arise, please do not hesitate to contact me or call our department.    
 
Karsten Mckinney MD 
[address] [place] [address] PGY-1

## 2020-05-29 NOTE — DISCHARGE INSTRUCTIONS
Patient Education        Learning About Venous Insufficiency  What is it? Venous insufficiency is a problem with the flow of blood from the veins of the legs back to the heart. It's also called chronic venous insufficiency or chronic venous stasis. Your veins bring blood back to the heart after it flows through your body. Veins have valves that keep the blood moving in one direction--toward the heart. But with venous insufficiency, the veins of the legs might not work as they should. This can allow blood to leak backward. Fluid can pool in the legs. This can lead to problems that include varicose veins. What causes it? Venous insufficiency is sometimes caused by deep vein thrombosis and high blood pressure inside leg veins. You are more likely to have venous insufficiency if you:  · Are older. · Are female. · Are overweight. · Don't get enough physical activity. · Smoke. · Have a family history of varicose veins. What are the symptoms? Symptoms of venous insufficiency affect the legs. They may include:  · Swelling, often in the ankles. · A rash. · Varicose veins. · Itching. · Cramping. · Skin sores (ulcers). · Aching or a feeling of heaviness. · Changes in skin color. How is it diagnosed? Your doctor can diagnose venous insufficiency by examining your legs and by using a type of ultrasound test (duplex Doppler) to find out how well blood is flowing in your legs. How is it treated? To reduce swelling and relieve pain caused by venous insufficiency, you can wear compression stockings. They are tighter at the ankles than at the top of the legs. They also can help venous skin ulcers heal. But there are different types of stockings, and they need to fit right. So your doctor will recommend what you need. You also can try to:  · Get more exercise, especially walking. It can increase blood flow. · Avoid standing still or sitting for a long time, which can make the fluid pool in your legs.   · Try not to sit with your legs crossed at the knee. · Keep your legs raised above your heart when you're lying down. This reduces swelling. If these treatments don't work, you may need medicine or a procedure to help relieve symptoms. Procedures might be done to close the vein, to remove the vein, or to improve blood flow. Follow-up care is a key part of your treatment and safety. Be sure to make and go to all appointments, and call your doctor if you are having problems. It's also a good idea to know your test results and keep a list of the medicines you take. Current as of: March 4, 2020               Content Version: 12.5  © 5761-9605 [employer]. Care instructions adapted under license by [employer] (which disclaims liability or warranty for this information). If you have questions about a medical condition or this instruction, always ask your healthcare professional. [address] any warranty or liability for your use of this information.

## 2020-05-30 NOTE — PROGRESS NOTES
Patient contacted regarding recent discharge and COVID-19 risk. CTN Attempt to contact patient via telephone on 5/30/20 for follow up. Unable to reach. Left message on voicemail with office contact information. No Patient medical information left on message.

## 2020-06-01 NOTE — PROGRESS NOTES
Ruth Horton is a 47 y.o. male who was seen by synchronous (real-time) audio-video technology on 6/1/2020. Consent: Ruth Horton, who was seen by synchronous (real-time) audio-video technology, and/or his healthcare decision maker, is aware that this patient-initiated, Telehealth encounter on 6/1/2020 is a billable service, with coverage as determined by his insurance carrier. He is aware that he may receive a bill and has provided verbal consent to proceed: Yes. Assessment & Plan:  
Diagnoses and all orders for this visit: 1. Seizure ([address] 
-     REFERRAL TO NEUROLOGY 2. Convulsions, unspecified convulsion type ([address] Other orders 
-     triamcinolone (ARISTOCORT) 0.5 % topical cream; Apply  to affected area two (2) times a day. use thin layer I spent at least 16 minutes on this visit with this established patient. ([id number]) [address] Subjective:  
Ruth Horton is a 47 y.o. male who was seen for Other (ED follow up) Patient states since beginning antibiotics swelling and weeping has improved. Reports wound is weeping some. Denies pain to area. States has not had a seziure since 1/2015. Requesting refill on Keppra, has not taken in approx. 1 year. [facility] ED visit 5/29/2020:  
Diagnosis Comment Venous stasis Hypertension, unspecified type Patient is a 58-year-old male with a history of end-stage renal Friday dialysis who was recently treated with doxycycline for an ulcer with signs of cellulitis. This happened Monday where erythema was noted and it was noted to be tender to palpation on the left anterior lower leg, was seen at [facility] and given doxycycline. Since then the erythema has succeeded, he has been afebrile but he has noted serous drainage from the ulcer. Otherwise no nausea vomiting diarrhea, denies fevers chills and states he also takes topical antibiotics prescribed by his PCM.   He states he has a follow-up appointment with his PCM on Monday already scheduled. Prior to Admission medications Medication Sig Start Date End Date Taking? Authorizing Provider  
isosorbide mononitrate ER (IMDUR) 120 mg CR tablet TAKE 1 TABLET BY MOUTH EVERY MORNING 5/18/20   Narcisa Hook NP  
triamcinolone (ARISTOCORT) 0.5 % topical cream Apply  to affected area two (2) times a day. use thin layer 4/17/20   Michelle HATCH NP  
carvedilol (COREG) 25 mg tablet Take 1 Tab by mouth two (2) times daily (with meals). 10/22/19   Adalberto Cuenca NP  
hydrALAZINE (APRESOLINE) 100 mg tablet TAKE 1 TABLET BY MOUTH THREE TIMES DAILY. 9/3/19   Carito Andrade MD  
furosemide (LASIX) 80 mg tablet Take 40 mg by mouth daily. Provider, Historical  
doxazosin (CARDURA) 2 mg tablet TAKE ONE TABLET BY MOUTH NIGHTLY 6/5/19   Carito Andrade MD  
cloNIDine HCl (CATAPRES) 0.3 mg tablet Take 1 Tab by mouth three (3) times daily. 3/18/19   Adalberto Cuenca NP  
sucroferric oxyhydroxide (VELPHORO PO) Take  by mouth Before breakfast, lunch, and dinner. Provider, Historical  
cinacalcet (SENSIPAR) 60 mg tab Take  by mouth every Monday, Thursday, Saturday. Injection during dialysis    Provider, Historical  
allopurinol (ZYLOPRIM) 100 mg tablet TAKE 1 TABLET BY MOUTH TWICE DAILY 3/15/18   Amor Basilio MD  
lisinopril (PRINIVIL, ZESTRIL) 40 mg tablet TAKE 1 TABLET BY MOUTH DAILY 4/19/17   Bia Bustamante MD  
NIFEdipine ER (PROCARDIA XL) 90 mg ER tablet Take 90 mg by mouth daily. Provider, Historical  
thiamine (VITAMIN B-1) 100 mg tablet Take  by mouth daily. Provider, Historical  
mupirocin calcium (BACTROBAN NASAL) 2 % nasal ointment by Both Nostrils route daily as needed. Indications: as  needed    Provider, Historical  
b complex-vitamin c-folic acid (NEPHROCAPS) 1 mg capsule Take 1 Cap by mouth daily.  [Request refills from PCP (Dr. Clemente Priest)] 3/31/17   Bia Bustamante MD  
 levETIRAcetam (KEPPRA) 250 mg tablet TAKE 1 TABLET BY MOUTH EVERY Monday, Wednesday, Friday AFTER [facility] DIALYSIS 12/8/16   Amairani Browning MD  
levETIRAcetam (KEPPRA) 500 mg tablet TAKE 1 TABLET BY MOUTH DAILY 12/8/16   Amairani Browning MD  
acetaminophen (TYLENOL) 500 mg tablet Take  by mouth every six (6) hours as needed for Pain. Provider, Historical  
simethicone (PHAZYME) 180 mg cap Take  by mouth daily as needed. Provider, Historical  
pantoprazole (PROTONIX) 40 mg tablet Take 1 Tab by mouth Daily (before breakfast). [Request refills from PCP (Dr. Taisha Priest)] 5/8/13   Gulshan Garcia MD  
cholecalciferol (VITAMIN D3) 1,000 unit tablet Take 2 Tabs by mouth two (2) times a day. [Request refills from PCP (Dr. Taisha Priest)] 5/8/13   Gulshan Garcia MD  
 
Allergies Allergen Reactions  Amlodipine Swelling  Nuts [Tree Nut] Swelling  Pcn [Penicillins] Unknown (comments) Unsure of reaction  Phenothiazines Other (comments) Per father, Sofy Cai has a parkinsonian reaction to this medication. \"  
 
Patient Active Problem List  
Diagnosis Code  End stage renal disease (HCC) N18.6  New onset seizure ([facility] R56.9  Hypertension, uncontrolled I10  
 Impaired mobility and ADLs Z74.09, Z78.9  Personal history of atrial flutter Z86.79  
 Pulmonary hypertension I27.20  Cardiomyopathy due to hypertension ([facility]) I11.9, I43  
 Edema of lower extremity R60.0  Anemia in chronic kidney disease N18.9, D63.1  Secondary hyperparathyroidism of renal origin ([address] N25.81  Vitamin D insufficiency E55.9  Generalized weakness R53.1  ARF (acute renal failure) ([facility]) N17.9  Metabolic acidosis O53.1  
 Uncontrolled hypertension I10  
 Accelerated hypertension I10  
 Hypertensive emergency I16.1  Advance care planning Z71.89  Renal failure N19  
 Anemia D64.9  Acute renal failure (HCC) N17.9  Syncope R55  Seizure ([facility] R56.9  Non compliance w medication regimen Z91.14  
 Other ascites R18.8  Chronic diastolic congestive heart failure (HCC) I50.32 Patient Active Problem List  
 Diagnosis Date Noted  Chronic diastolic congestive heart failure ([facility] 05/24/2018  Other ascites 04/10/2017  Non compliance w medication regimen 12/06/2016  Syncope 12/05/2016  Seizure ([facility] 12/05/2016  Renal failure 09/10/2016  Anemia 09/10/2016  Acute renal failure ([facility] 09/10/2016  Advance care planning 03/01/2016  Uncontrolled hypertension 06/26/2015  Accelerated hypertension 06/26/2015  Hypertensive emergency 06/26/2015  ARF (acute renal failure) ([facility] 01/16/2015  Metabolic acidosis 67/98/5662  Generalized weakness 05/02/2013  Vitamin D insufficiency 04/25/2013  Hypertension, uncontrolled 04/24/2013  Impaired mobility and ADLs 04/24/2013  Personal history of atrial flutter 04/24/2013  Pulmonary hypertension 04/24/2013  Cardiomyopathy due to hypertension ([facility] 04/24/2013  Edema of lower extremity 04/24/2013  Anemia in chronic kidney disease 04/24/2013  Secondary hyperparathyroidism of renal origin ([facility] 04/24/2013  End stage renal disease ([facility] 04/19/2013  New onset seizure ([facility] 04/19/2013 Current Outpatient Medications Medication Sig Dispense Refill  isosorbide mononitrate ER (IMDUR) 120 mg CR tablet TAKE 1 TABLET BY MOUTH EVERY MORNING 90 Tab 1  
 triamcinolone (ARISTOCORT) 0.5 % topical cream Apply  to affected area two (2) times a day. use thin layer 15 g 0  
 carvedilol (COREG) 25 mg tablet Take 1 Tab by mouth two (2) times daily (with meals). 180 Tab 3  
 hydrALAZINE (APRESOLINE) 100 mg tablet TAKE 1 TABLET BY MOUTH THREE TIMES DAILY. 270 Tab 0  
 furosemide (LASIX) 80 mg tablet Take 40 mg by mouth daily.  doxazosin (CARDURA) 2 mg tablet TAKE ONE TABLET BY MOUTH NIGHTLY 90 Tab 0  cloNIDine HCl (CATAPRES) 0.3 mg tablet Take 1 Tab by mouth three (3) times daily. 270 Tab 1  
 sucroferric oxyhydroxide (VELPHORO PO) Take  by mouth Before breakfast, lunch, and dinner.  cinacalcet (SENSIPAR) 60 mg tab Take  by mouth every Monday, Thursday, Saturday. Injection during dialysis  allopurinol (ZYLOPRIM) 100 mg tablet TAKE 1 TABLET BY MOUTH TWICE DAILY 180 Tab 0  
 lisinopril (PRINIVIL, ZESTRIL) 40 mg tablet TAKE 1 TABLET BY MOUTH DAILY 90 Tab 0  
 NIFEdipine ER (PROCARDIA XL) 90 mg ER tablet Take 90 mg by mouth daily.  thiamine (VITAMIN B-1) 100 mg tablet Take  by mouth daily.  mupirocin calcium (BACTROBAN NASAL) 2 % nasal ointment by Both Nostrils route daily as needed. Indications: as  needed  b complex-vitamin c-folic acid (NEPHROCAPS) 1 mg capsule Take 1 Cap by mouth daily. [Request refills from PCP (Dr. Jad Priest)] 30 Cap 1  
 levETIRAcetam (KEPPRA) 250 mg tablet TAKE 1 TABLET BY MOUTH EVERY Monday, Wednesday, Friday AFTER EACH DIALYSIS 15 Tab 0  
 levETIRAcetam (KEPPRA) 500 mg tablet TAKE 1 TABLET BY MOUTH DAILY 30 Tab 0  
 acetaminophen (TYLENOL) 500 mg tablet Take  by mouth every six (6) hours as needed for Pain.  simethicone (PHAZYME) 180 mg cap Take  by mouth daily as needed.  pantoprazole (PROTONIX) 40 mg tablet Take 1 Tab by mouth Daily (before breakfast). [Request refills from PCP (Dr. Jad Priest)] 15 Tab 0  cholecalciferol (VITAMIN D3) 1,000 unit tablet Take 2 Tabs by mouth two (2) times a day. [Request refills from PCP (Dr. Jad Priest)] 60 Tab 0 Allergies Allergen Reactions  Amlodipine Swelling  Nuts [Tree Nut] Swelling  Pcn [Penicillins] Unknown (comments) Unsure of reaction  Phenothiazines Other (comments) Per father, Monie Nugent has a parkinsonian reaction to this medication. \"  
 
Past Medical History:  
Diagnosis Date  Anemia in chronic kidney disease(285.21) 4/24/2013  Ascites  Cardiomyopathy due to hypertension ([facility] 4/24/2013 EF 20%  Chronic kidney disease   
 dialysis MWF at [facility] on [address] [phone]  
 Edema of lower extremity 4/24/2013  End stage renal disease ([facility] 04/19/2013 HD since 9/2003  Hyperphosphatemia 4/24/2013  Hypertension  Hypertension, uncontrolled 4/24/2013  Personal history of atrial flutter 4/24/2013  Pulmonary hypertension ([facility] 4/24/2013  Recommendation refused by patient 4/24/2013 Patient refuses dialysis (hemodialysis or peritoneal dialysis)  Secondary hyperparathyroidism of renal origin ([address] 4/24/2013  Seizures ([facility]   
 on Keppra, last seizure 1/2015  Sleep apnea   
 no cpap  Stroke ([facility] TIA 2015  Vitamin D insufficiency 4/25/2013 Vitamin D 25-Hydroxy 20.7 Past Surgical History:  
Procedure Laterality Date  HX CSF SHUNT  09/2016  HX HEART CATHETERIZATION  01/2015 Family History Problem Relation Age of Onset  Hypertension Mother  Hypertension Father  Heart Attack Neg Hx  Stroke Neg Hx Social History Tobacco Use  Smoking status: Never Smoker  Smokeless tobacco: Never Used Substance Use Topics  Alcohol use: No  
 
Review of Systems Constitutional: Negative for chills and fever. Respiratory: Negative for shortness of breath. Cardiovascular: Negative for chest pain and palpitations. Neurological: Negative for dizziness and headaches. Objective: There were no vitals taken for this visit. General: alert, cooperative, no distress Mental  status: normal mood, behavior, speech, dress, motor activity, and thought processes, able to follow commands HENT: NCAT Neck: no visualized mass Resp: no respiratory distress Neuro: no gross deficits Skin: no discoloration or lesions of concern on visible areas Psychiatric: normal affect, consistent with stated mood, no evidence of hallucinations Additional exam findings: Dry appearing open wound to left lower anterior leg We discussed the expected course, resolution and complications of the diagnosis(es) in detail. Medication risks, benefits, costs, interactions, and alternatives were discussed as indicated. I advised him to contact the office if his condition worsens, changes or fails to improve as anticipated. He expressed understanding with the diagnosis(es) and plan. Lorna Dave is a 47 y.o. male who was evaluated by a video visit encounter for concerns as above. Patient identification was verified prior to start of the visit. A caregiver was present when appropriate. Due to this being a TeleHealth encounter (During HTYQJ-94 public health emergency), evaluation of the following organ systems was limited: Vitals/Constitutional/EENT/Resp/CV/GI//MS/Neuro/Skin/Heme-Lymph-Imm. Pursuant to the emergency declaration under the [facility], [facility] waiver authority and the [employer] Act, this Virtual  Visit was conducted, with patient's (and/or legal guardian's) consent, to reduce the patient's risk of exposure to COVID-19 and provide necessary medical care. Services were provided through a video synchronous discussion virtually to substitute for in-person clinic visit. Patient was located in their home and I was in the office while conducting this encounter.    
Zia Garcia NP

## 2020-06-04 NOTE — H&P
OUTPATIENT HISTORY AND PHYSICAL      Today 6/4/2020     Indication/Symptoms:   Savi Castellon is a 47 y.o. male with recurrent ascites who presents for an US-guided paracentesis. Current Meds:    Prior to Admission medications    Medication Sig Start Date End Date Taking? Authorizing Provider   sevelamer carbonate (Renvela) 800 mg tab tab Take  by mouth three (3) times daily. Provider, Historical   triamcinolone (ARISTOCORT) 0.5 % topical cream Apply  to affected area two (2) times a day. use thin layer 6/1/20   Tejas Martell TVALENTINA   isosorbide mononitrate ER (IMDUR) 120 mg CR tablet TAKE 1 TABLET BY MOUTH EVERY MORNING 5/18/20   Narcisa Hook NP   carvedilol (COREG) 25 mg tablet Take 1 Tab by mouth two (2) times daily (with meals). 10/22/19   Adalberto Cuenca NP   hydrALAZINE (APRESOLINE) 100 mg tablet TAKE 1 TABLET BY MOUTH THREE TIMES DAILY. 9/3/19   Jesus Major MD   furosemide (LASIX) 80 mg tablet Take 40 mg by mouth daily. Provider, Historical   doxazosin (CARDURA) 2 mg tablet TAKE ONE TABLET BY MOUTH NIGHTLY 6/5/19   Jesus Major MD   cloNIDine HCl (CATAPRES) 0.3 mg tablet Take 1 Tab by mouth three (3) times daily. 3/18/19   Adalberto Ceunca NP   sucroferric oxyhydroxide (VELPHORO PO) Take  by mouth Before breakfast, lunch, and dinner. Provider, Historical   allopurinol (ZYLOPRIM) 100 mg tablet TAKE 1 TABLET BY MOUTH TWICE DAILY 3/15/18   Christel Peralta MD   lisinopril (PRINIVIL, ZESTRIL) 40 mg tablet TAKE 1 TABLET BY MOUTH DAILY 4/19/17   Cristy Mims MD   thiamine (VITAMIN B-1) 100 mg tablet Take  by mouth daily. Provider, Historical   mupirocin calcium (BACTROBAN NASAL) 2 % nasal ointment by Both Nostrils route daily as needed. Indications: as  needed    Provider, Historical   b complex-vitamin c-folic acid (NEPHROCAPS) 1 mg capsule Take 1 Cap by mouth daily.  [Request refills from PCP (Dr. Taisha Priest)] 3/31/17   Cristy Mims MD   levETIRAcetam (KEPPRA) 250 mg tablet TAKE 1 TABLET BY MOUTH EVERY Monday, Wednesday, Friday AFTER [facility] DIALYSIS 12/8/16   Raffaele Weeks MD   levETIRAcetam (KEPPRA) 500 mg tablet TAKE 1 TABLET BY MOUTH DAILY 12/8/16   Raffaele Weeks MD   acetaminophen (TYLENOL) 500 mg tablet Take  by mouth every six (6) hours as needed for Pain. Provider, Historical   simethicone (PHAZYME) 180 mg cap Take  by mouth daily as needed. Provider, Historical   pantoprazole (PROTONIX) 40 mg tablet Take 1 Tab by mouth Daily (before breakfast). [Request refills from PCP (Dr. Osmany Priest)] 5/8/13   Gely Guevara MD   cholecalciferol (VITAMIN D3) 1,000 unit tablet Take 2 Tabs by mouth two (2) times a day. [Request refills from PCP (Dr. Osmany Priest)] 5/8/13   Gely Guevara MD       Allergies: Allergies   Allergen Reactions    Amlodipine Swelling    Nuts [Tree Nut] Swelling    Pcn [Penicillins] Unknown (comments)     Unsure of reaction    Phenothiazines Other (comments)     Per father, \"He has a parkinsonian reaction to this medication. \"       Comorbid Conditions:    Past Medical History:   Diagnosis Date    Anemia in chronic kidney disease(285.21) 4/24/2013    Ascites     Cardiomyopathy due to hypertension ([address] 4/24/2013    EF 20%    Chronic kidney disease     dialysis MWF at [facility] on [address]  [phone]    Edema of lower extremity 4/24/2013    End stage renal disease ([address] 04/19/2013    HD since 9/2003    Hyperphosphatemia 4/24/2013    Hypertension     Hypertension, uncontrolled 4/24/2013    Personal history of atrial flutter 4/24/2013    Pulmonary hypertension ([address] 4/24/2013    Recommendation refused by patient 4/24/2013    Patient refuses dialysis (hemodialysis or peritoneal dialysis)    Secondary hyperparathyroidism of renal origin ([address] 4/24/2013    Seizures ([address]     on Keppra, last seizure 1/2015    Sleep apnea     no cpap    Stroke [facility])     TIA 2015    Vitamin D insufficiency 4/25/2013 Vitamin D 25-Hydroxy 20.7           Past Surgical History:   Procedure Laterality Date    HX CSF SHUNT  09/2016    HX HEART CATHETERIZATION  01/2015     Data:    There were no vitals taken for this visit.:  Recent Labs     06/04/20  0900        Recent Labs     06/04/20  0900   INR 1.4*   APTT 35.6       The H & P and/or progress notes and any available imaging were reviewed. The risks, indications and possible alternatives to the procedure, including doing nothing, were discussed and informed consent was obtained. Physical Exam:      Mental status:   Alert and oriented. Heart:   Regular rate. Lungs:  Normal respiratory effort. The patient is an appropriate candidate to undergo the planned procedure.      Jessica Ortiz

## 2020-06-15 NOTE — ED TRIAGE NOTES
Pt arrives to ER for ongoing and worsening for one week peripheral edema and left lower leg pain secondary to infection. Pt has been seen and treated within the last month for same issues. Pt is also on dialysis and last treatment was on Thursday. Pt had a reaction during that visit and his swelling became worse. Pt is due for dialysis at 1200.

## 2020-06-15 NOTE — HOME CARE
Discharge noted for today from ED. Received home health referral for [facility] for SN - wound care. Spoke with patient, explained services and answered all questions. Demographics verified. Referral processed and emailed to central office. Patient has the following DME: frank Weaver, [facility] Liaison

## 2020-06-15 NOTE — TELEPHONE ENCOUNTER
Pt sister came into office with pt in car, states was recently seen in ER for leg wounds. Pt went to dialysis today but was unable to complete treatment due to severe pain and machine issues. Says that pain with open wound on left leg continues to increase. Wants to know if anything can be given for pain or requesting to direct to next step for help.   Please adv [phone]

## 2020-06-15 NOTE — PROGRESS NOTES
Home health orders received for wound care. Orders sent to [facility], office called, patient put in que. Layo Claros, RN BSN Care Manager [phone]

## 2020-06-15 NOTE — DISCHARGE INSTRUCTIONS
Patient Education        Venous Skin Ulcer: Care Instructions  Your Care Instructions  A venous skin ulcer is a shallow wound that develops when the leg veins do not move blood back to the heart normally. Your veins have one-way valves that keep blood flowing toward the heart. When the valves are damaged, the blood can back up and pool in the vein. The blood may leak out of the vein into tissue around the vein. The tissue can break down and form an ulcer. The first sign of a venous skin ulcer is skin that turns dark red or purple over the area where the blood is leaking out of the vein. The skin also may become thick, dry, and itchy. Without treatment, an ulcer may form. The ulcer may be painful. Your leg also may swell and ache. If the ulcer becomes infected, the infection may cause an odor, and pus may drain from the ulcer. The area around the ulcer also may be more tender and red. Follow-up care is a key part of your treatment and safety. Be sure to make and go to all appointments, and call your doctor if you are having problems. It's also a good idea to know your test results and keep a list of the medicines you take. How can you care for yourself at home? · Follow your doctor's instructions on how to clean the ulcer and change the bandage. · If your doctor prescribed antibiotics, take them as directed. Do not stop taking them just because you feel better. You need to take the full course of antibiotics. · Lift your legs above the level of your heart as often as possible. For example, lie down and then prop up your legs with pillows. · Wear compression stockings or bandages. They help the blood circulate in your legs. And they help prevent blood from pooling in your legs. But there are different types of stockings, and they need to fit right. So your doctor will recommend what you need. · After your ulcer has healed, continue to wear compression stockings. Take them off only when you bathe and sleep. Compression helps your blood circulate and helps prevent other ulcers from forming. · Walk daily. Walking helps your blood circulation. When should you call for help? Call your doctor now or seek immediate medical care if:  · You have symptoms of infection, such as:  ? Increased pain, swelling, warmth, or redness. ? Red streaks leading from the ulcer. ? Pus draining from the ulcer. ? A fever. Watch closely for changes in your health, and be sure to contact your doctor if:  · Your ulcer is not healing. · You have new ulcers. · The ulcer starts to bleed, and blood soaks through the bandage. Oozing small amounts of a mix of blood and fluid is normal.  · You have new bleeding. · You do not get better as expected. Where can you learn more? Go to http://randa-pavel.info/  Enter R374 in the search box to learn more about \"Venous Skin Ulcer: Care Instructions. \"  Current as of: June 26, 2019               Content Version: 12.5  © 1100-3591 [employer]. Care instructions adapted under license by [employer] (which disclaims liability or warranty for this information). If you have questions about a medical condition or this instruction, always ask your healthcare professional. [address] any warranty or liability for your use of this information.

## 2020-06-15 NOTE — ED PROVIDER NOTES
EMERGENCY DEPARTMENT HISTORY AND PHYSICAL EXAM 
This was created with voice recognition software and transcription errors may be present. 7:10 AM 
Date: 6/15/2020 Patient Name: Cr Sanches History of Presenting Illness Chief Complaint: 
 
History Provided By:  
 
HPI: Cr Sanches is a 47 y.o. male past medical history of anemia cardiomyopathy CKD dialyzes on a Monday Wednesday Friday edema of his lower extremity hyperphosphatemia hypertension pulmonary hypertension seizures sleep apnea who presents with left leg swelling and ulceration. Patient was recently admitted for this and notes it is been getting worse since his discharge. He did have it evaluated for DVT and was found to be negative. He denies fever chills. No nausea or vomiting. Has worsening leg swelling. PCP: None Past History Past Medical History: 
Past Medical History:  
Diagnosis Date  Anemia in chronic kidney disease(285.21) 4/24/2013  Ascites  Cardiomyopathy due to hypertension ([address] 4/24/2013 EF 20%  Chronic kidney disease   
 dialysis MWF at [facility] [address] [phone]  
 Edema of lower extremity 4/24/2013  End stage renal disease ([address] 04/19/2013 HD since 9/2003  Hyperphosphatemia 4/24/2013  Hypertension  Hypertension, uncontrolled 4/24/2013  Personal history of atrial flutter 4/24/2013  Pulmonary hypertension ([address] 4/24/2013  Recommendation refused by patient 4/24/2013 Patient refuses dialysis (hemodialysis or peritoneal dialysis)  Secondary hyperparathyroidism of renal origin ([address] 4/24/2013  Seizures ([address]   
 on Keppra, last seizure 1/2015  Sleep apnea   
 no cpap  Stroke ([address] TIA 2015  Vitamin D insufficiency 4/25/2013 Vitamin D 25-Hydroxy 20.7 Past Surgical History: 
Past Surgical History:  
Procedure Laterality Date  HX CSF SHUNT  09/2016  HX HEART CATHETERIZATION  01/2015 Family History: 
Family History Problem Relation Age of Onset  Hypertension Mother  Hypertension Father  Heart Attack Neg Hx  Stroke Neg Hx Social History: 
Social History Tobacco Use  Smoking status: Never Smoker  Smokeless tobacco: Never Used Substance Use Topics  Alcohol use: No  
 Drug use: No  
 
 
Allergies: Allergies Allergen Reactions  Amlodipine Swelling  Nuts [Tree Nut] Swelling  Pcn [Penicillins] Unknown (comments) Unsure of reaction  Phenothiazines Other (comments) Per father, Paulino Wilks has a parkinsonian reaction to this medication. \" Review of Systems Review of Systems All other systems reviewed and are negative. 10 point review of systems otherwise negative unless noted in HPI. Physical Exam  
 
 
Physical Exam 
Constitutional:   
   Appearance: He is well-developed. HENT:  
   Head: Normocephalic and atraumatic. Eyes:  
   Pupils: Pupils are equal, round, and reactive to light. Neck: Musculoskeletal: Normal range of motion and neck supple. Cardiovascular:  
   Rate and Rhythm: Normal rate and regular rhythm. Heart sounds: Normal heart sounds. No murmur. No friction rub. Pulmonary:  
   Effort: Pulmonary effort is normal. No respiratory distress. Breath sounds: Normal breath sounds. No wheezing. Abdominal:  
   General: There is no distension. Palpations: Abdomen is soft. Tenderness: There is no abdominal tenderness. There is no guarding or rebound. Musculoskeletal: Normal range of motion. Skin: 
   General: Skin is warm and dry. Neurological:  
   Mental Status: He is alert and oriented to person, place, and time. Psychiatric:     
   Behavior: Behavior normal.     
   Thought Content: Thought content normal.  
 
 
 
Diagnostic Study Results Vital Signs Visit Vitals BP (!) 189/98 (BP 1 Location: Right arm, BP Patient Position: At rest) Pulse 77 Temp 98.5 °F (36.9 °C) Resp 16 Ht 5' 11\" (1.803 m) Wt 84.8 kg (187 lb) SpO2 95% BMI 26.08 kg/m² EKG: 
Labs:  
Imaging:  
 
Medical Decision Making ED Course: Progress Notes, Reevaluation, and Consults: 
 
 
Provider Notes (Medical Decision Making): This is a 25-year-old gentleman with unilateral left leg swelling with ulceration. He did have evaluated for DVT however I only see one study that was done back in March so I will repeat the study. It looks ulcerated looks macerated however I do not see any purulence or significant erythema. He will likely need wound care and some sort of diuresis he although he only a small amount. I think this is going to need long-term wound care over short-term admission antibiotics. Diagnosis Clinical Impression: No diagnosis found. Disposition: 
 
Patient's Medications Start Taking No medications on file Continue Taking ACETAMINOPHEN (TYLENOL) 500 MG TABLET    Take  by mouth every six (6) hours as needed for Pain. ALLOPURINOL (ZYLOPRIM) 100 MG TABLET    TAKE 1 TABLET BY MOUTH TWICE DAILY B COMPLEX-VITAMIN C-FOLIC ACID (NEPHROCAPS) 1 MG CAPSULE    Take 1 Cap by mouth daily. [Request refills from PCP (Dr. Cheryle Christians Barnwell)] CARVEDILOL (COREG) 25 MG TABLET    Take 1 Tab by mouth two (2) times daily (with meals). CHOLECALCIFEROL (VITAMIN D3) 1,000 UNIT TABLET    Take 2 Tabs by mouth two (2) times a day. [Request refills from PCP (Dr. Cheryle Christians Barnwell)] CLONIDINE HCL (CATAPRES) 0.3 MG TABLET    Take 1 Tab by mouth three (3) times daily. DOXAZOSIN (CARDURA) 2 MG TABLET    TAKE ONE TABLET BY MOUTH NIGHTLY FUROSEMIDE (LASIX) 80 MG TABLET    Take 40 mg by mouth daily. HYDRALAZINE (APRESOLINE) 100 MG TABLET    TAKE 1 TABLET BY MOUTH THREE TIMES DAILY.   
 ISOSORBIDE MONONITRATE ER (IMDUR) 120 MG CR TABLET    TAKE 1 TABLET BY MOUTH EVERY MORNING  
 LEVETIRACETAM (KEPPRA) 250 MG TABLET    TAKE 1 TABLET BY MOUTH EVERY Monday, Wednesday, Friday AFTER EACH DIALYSIS  
 LEVETIRACETAM (KEPPRA) 500 MG TABLET    TAKE 1 TABLET BY MOUTH DAILY  
 LISINOPRIL (PRINIVIL, ZESTRIL) 40 MG TABLET    TAKE 1 TABLET BY MOUTH DAILY MUPIROCIN CALCIUM (BACTROBAN NASAL) 2 % NASAL OINTMENT    by Both Nostrils route daily as needed. Indications: as  needed PANTOPRAZOLE (PROTONIX) 40 MG TABLET    Take 1 Tab by mouth Daily (before breakfast). [Request refills from PCP (Dr. Sydnie Priest)] SEVELAMER CARBONATE (RENVELA) 800 MG TAB TAB    Take  by mouth three (3) times daily. SIMETHICONE (PHAZYME) 180 MG CAP    Take  by mouth daily as needed. SUCROFERRIC OXYHYDROXIDE (VELPHORO PO)    Take  by mouth Before breakfast, lunch, and dinner. THIAMINE (VITAMIN B-1) 100 MG TABLET    Take  by mouth daily. TRIAMCINOLONE (ARISTOCORT) 0.5 % TOPICAL CREAM    Apply  to affected area two (2) times a day. use thin layer These Medications have changed No medications on file Stop Taking No medications on file

## 2021-08-20 NOTE — MR AVS SNAPSHOT
[address] [facility] 
 
 
 [address] [facility], [address] [facility] [place] [address] 
[phone] Patient: Tania Morrison MRN: [id number] Visit Information Date & Time Provider Department Dept. Phone Encounter #  
 2018  8:45 AM Shakir Aguilar MD [facility] [address] [place] [address] [id number] Follow-up Instructions Return in about 3 months (around 2018), or if symptoms worsen or fail to improve. Your Appointments 2018  9:45 AM  
Office Visit with Shakir Aguilar MD  
[facility]) Appt Note:   
 [address] [facility], [address] [facility] [place] [address]  
[address] [facility], [address] [place] [address] Upcoming Health Maintenance Date Due Pneumococcal 19-64 Highest Risk (1 of 3 - PCV13) 10/1/1988 DTaP/Tdap/Td series (1 - Tdap) 10/1/1990 MEDICARE YEARLY EXAM 3/14/2018 Influenza Age 5 to Adult 2018 Allergies as of 2018  Review Complete On: 2018 By: Shakir Aguilar MD  
  
 Severity Noted Reaction Type Reactions Amlodipine  2015    Swelling Nuts [Tree Nut]  2013    Swelling Pcn [Penicillins]  2013    Unknown (comments) Phenothiazines  2013    Other (comments) Per father, Treva Cramer has a parkinsonian reaction to this medication. \" Current Immunizations  Reviewed on 2015 Name Date Influenza Vaccine (Quad) PF 2016  6:50 PM  
  
 Not reviewed this visit You Were Diagnosed With   
  
 Codes Comments Chronic diastolic congestive heart failure ([facility]    -  Primary ICD-10-CM: I50.32 
ICD-9-CM: 428.32, 428.0  ESRD- misses HD about 1/wk Pulmonary hypertension (HCC)     ICD-10-CM: I27.20 ICD-9-CM: 416.8  PAP70, significant fluid overload, noncompl with HD;   ZET69-44  Hypertension, uncontrolled     ICD-10-CM: I10 
 Outreach attempt was made to schedule a Medicare Wellness Visit. This was the second attempt. Contact was not made, left message.   ICD-9-CM: 401.9 5/18; 3/18; 11/16 needs more aggressive HD  
d/w pt in detail; try minoxidil if BP high when no edema Non compliance w medication regimen     ICD-10-CM: Z91.14 
ICD-9-CM: V15.81 Vitals BP Pulse Height(growth percentile) Weight(growth percentile) BMI Smoking Status (!) 173/101 79 5' 11\" (1.803 m) 185 lb (83.9 kg) 25.8 kg/m2 Never Smoker Vitals History BMI and BSA Data Body Mass Index Body Surface Area  
 25.8 kg/m 2 2.05 m 2 Preferred Pharmacy Pharmacy Name Phone [facility] [address] [place], [place] [address] [address] [place] [address] [phone] Your Updated Medication List  
  
   
This list is accurate as of 5/24/18  9:51 AM.  Always use your most recent med list.  
  
  
  
  
 acetaminophen 500 mg tablet Commonly known as:  TYLENOL Take  by mouth every six (6) hours as needed for Pain. allopurinol 100 mg tablet Commonly known as:  ZYLOPRIM  
TAKE 1 TABLET BY MOUTH TWICE DAILY  
  
 aspirin delayed-release 81 mg tablet Take 1 tablet by mouth daily. b complex-vitamin c-folic acid 1 mg capsule Commonly known as:  Darolyn Maldonado Take 1 Cap by mouth daily. [Request refills from PCP (Dr. Felton Priest)] BACTROBAN NASAL 2 % nasal ointment Generic drug:  mupirocin calcium  
by Both Nostrils route two (2) times a day. Indications: as  needed  
  
 carvedilol 25 mg tablet Commonly known as:  COREG  
TAKE 1 TABLET BY MOUTH TWICE DAILY WITH MEALS  
  
 cholecalciferol 1,000 unit tablet Commonly known as:  VITAMIN D3 Take 2 Tabs by mouth two (2) times a day. [Request refills from PCP (Dr. Felton Priest)]  
  
 cloNIDine HCl 0.3 mg tablet Commonly known as:  CATAPRES Take 1 Tab by mouth three (3) times daily. cyclobenzaprine 10 mg tablet Commonly known as:  FLEXERIL Take 0.5 Tabs by mouth two (2) times a day. doxazosin 1 mg tablet Commonly known as:  CARDURA TAKE 1 TABLET BY MOUTH EVERY NIGHT  
  
 hydrALAZINE 100 mg tablet Commonly known as:  APRESOLINE Take 1 Tab by mouth three (3) times daily. isosorbide mononitrate  mg CR tablet Commonly known as:  IMDUR  
TAKE 1 TABLET BY MOUTH EVERY MORNING  
  
 * levETIRAcetam 250 mg tablet Commonly known as:  KEPPRA TAKE 1 TABLET BY MOUTH EVERY Monday, Wednesday, Friday AFTER EACH DIALYSIS  
  
 * levETIRAcetam 500 mg tablet Commonly known as:  KEPPRA TAKE 1 TABLET BY MOUTH DAILY  
  
 lisinopril 40 mg tablet Commonly known as:  PRINIVIL, ZESTRIL  
TAKE 1 TABLET BY MOUTH DAILY  
  
 NIFEdipine ER 90 mg ER tablet Commonly known as:  PROCARDIA XL Take 90 mg by mouth daily. oxyCODONE-acetaminophen 5-325 mg per tablet Commonly known as:  PERCOCET Take 1 Tab by mouth every eight (8) hours as needed. Max Daily Amount: 3 Tabs. pantoprazole 40 mg tablet Commonly known as:  PROTONIX Take 1 Tab by mouth Daily (before breakfast). [Request refills from PCP (Dr. Herve Priest)] PHAZYME 180 mg Cap Generic drug:  simethicone Take  by mouth. PRO-STAT 64 PO Take  by mouth. RENVELA 800 mg Tab tab Generic drug:  sevelamer carbonate Take  by mouth three (3) times daily. senna-docusate 8.6-50 mg per tablet Commonly known as:  Gamaliel Pont Take 1 Tab by mouth daily. SENSIPAR 60 mg Tab Generic drug:  cinacalcet Take  by mouth. VITAMIN B-1 100 mg tablet Generic drug:  thiamine Take  by mouth daily. * Notice: This list has 2 medication(s) that are the same as other medications prescribed for you. Read the directions carefully, and ask your doctor or other care provider to review them with you. Follow-up Instructions Return in about 3 months (around 8/24/2018), or if symptoms worsen or fail to improve.   
  
To-Do List   
 05/31/2018 9:00 AM  
  Appointment with [facility] ANGIO RADIOLOGIST; [address] at [address] [facility] ([phone]) OUTSIDE FILMS  - Any outside films related to the study being scheduled should be brought with you on the day of the exam.  If this cannot be done there may be a delay in the reading of the study. MEDICATIONS  - Patient must bring a complete list of all medications currently taking to include prescriptions, over-the-counter meds, herbals, vitamins & any dietary supplements 06/21/2018 9:00 AM  
  Appointment with [facility] ANGIO RADIOLOGIST; [address] at [address] [place] [address] ([phone]) OUTSIDE FILMS  - Any outside films related to the study being scheduled should be brought with you on the day of the exam.  If this cannot be done there may be a delay in the reading of the study. MEDICATIONS  - Patient must bring a complete list of all medications currently taking to include prescriptions, over-the-counter meds, herbals, vitamins & any dietary supplements 07/12/2018 9:00 AM  
  Appointment with [facility] ANGIO RADIOLOGIST; [address] at [address] [place] [address] ([phone]) OUTSIDE FILMS  - Any outside films related to the study being scheduled should be brought with you on the day of the exam.  If this cannot be done there may be a delay in the reading of the study. MEDICATIONS  - Patient must bring a complete list of all medications currently taking to include prescriptions, over-the-counter meds, herbals, vitamins & any dietary supplements 08/02/2018 9:00 AM  
  Appointment with [facility] ANGIO RADIOLOGIST; [address] at [address] [place] [address] ([phone]) OUTSIDE FILMS  - Any outside films related to the study being scheduled should be brought with you on the day of the exam.  If this cannot be done there may be a delay in the reading of the study. MEDICATIONS  - Patient must bring a complete list of all medications currently taking to include prescriptions, over-the-counter meds, herbals, vitamins & any dietary supplements Patient Instructions There are no discontinued medications. Avoiding Triggers With Heart Failure: Care Instructions Your Care Instructions Triggers are anything that make your heart failure flare up. A flare-up is also called \"sudden heart failure\" or \"acute heart failure. \" When you have a flare-up, fluid builds up in your lungs, and you have problems breathing. You might need to go to the hospital. By watching for changes in your condition and avoiding triggers, you can prevent heart failure flare-ups. Follow-up care is a key part of your treatment and safety. Be sure to make and go to all appointments, and call your doctor if you are having problems. It's also a good idea to know your test results and keep a list of the medicines you take. How can you care for yourself at home? Watch for changes in your weight and condition · Weigh yourself without clothing at the same time each day. Record your weight. Call your doctor if you have sudden weight gain, such as more than 2 to 3 pounds in a day or 5 pounds in a week. (Your doctor may suggest a different range of weight gain.) A sudden weight gain may mean that your heart failure is getting worse. · Keep a daily record of your symptoms. Write down any changes in how you feel, such as new shortness of breath, cough, or problems eating. Also record if your ankles are more swollen than usual and if you feel more tired than usual. Note anything that you ate or did that could have triggered these changes. Limit sodium Sodium causes your body to hold on to extra water. This may cause your heart failure symptoms to get worse. People get most of their sodium from processed foods. Fast food and restaurant meals also tend to be very high in sodium. · Your doctor may suggest that you limit sodium to 2,000 milligrams (mg) a day or less. That is less than 1 teaspoon of salt a day, including all the salt you eat in cooking or in packaged foods. · Read food labels on cans and food packages.  They tell you how much sodium you get in one serving. Check the serving size. If you eat more than one serving, you are getting more sodium. · Be aware that sodium can come in forms other than salt, including monosodium glutamate (MSG), sodium citrate, and sodium bicarbonate (baking soda). MSG is often added to Asian food. You can sometimes ask for food without MSG or salt. · Slowly reducing salt will help you adjust to the taste. Take the salt shaker off the table. · Flavor your food with garlic, lemon juice, onion, vinegar, herbs, and spices instead of salt. Do not use soy sauce, steak sauce, onion salt, garlic salt, mustard, or ketchup on your food, unless it is labeled \"low-sodium\" or \"low-salt. \" 
· Make your own salad dressings, sauces, and ketchup without adding salt. · Use fresh or frozen ingredients, instead of canned ones, whenever you can. Choose low-sodium canned goods. · Eat less processed food and food from restaurants, including fast food. Exercise as directed Moderate, regular exercise is very good for your heart. It improves your blood flow and helps control your weight. But too much exercise can stress your heart and cause a heart failure flare-up. · Check with your doctor before you start an exercise program. 
· Walking is an easy way to get exercise. Start out slowly. Gradually increase the length and pace of your walk. Swimming, riding a bike, and using a treadmill are also good forms of exercise. · When you exercise, watch for signs that your heart is working too hard. You are pushing yourself too hard if you cannot talk while you are exercising. If you become short of breath or dizzy or have chest pain, stop, sit down, and rest. 
· Do not exercise when you do not feel well. Take medicines correctly · Take your medicines exactly as prescribed. Call your doctor if you think you are having a problem with your medicine. · Make a list of all the medicines you take.  Include those prescribed to you by other doctors and any over-the-counter medicines, vitamins, or supplements you take. Take this list with you when you go to any doctor. · Take your medicines at the same time every day. It may help you to post a list of all the medicines you take every day and what time of day you take them. · Make taking your medicine as simple as you can. Plan times to take your medicines when you are doing other things, such as eating a meal or getting ready for bed. This will make it easier to remember to take your medicines. · Get organized. Use helpful tools, such as daily or weekly pill containers. When should you call for help? Call 911 if you have symptoms of sudden heart failure such as: 
? · You have severe trouble breathing. ? · You cough up pink, foamy mucus. ? · You have a new irregular or rapid heartbeat. ?Call your doctor now or seek immediate medical care if: 
? · You have new or increased shortness of breath. ? · You are dizzy or lightheaded, or you feel like you may faint. ? · You have sudden weight gain, such as more than 2 to 3 pounds in a day or 5 pounds in a week. (Your doctor may suggest a different range of weight gain.) ? · You have increased swelling in your legs, ankles, or feet. ? · You are suddenly so tired or weak that you cannot do your usual activities. ? Watch closely for changes in your health, and be sure to contact your doctor if you develop new symptoms. Where can you learn more? Go to http://randa-pavel.info/. Enter S578 in the search box to learn more about \"Avoiding Triggers With Heart Failure: Care Instructions. \" Current as of: September 21, 2016 Content Version: 11.4 © 5217-4448 [employer]. Care instructions adapted under license by [employer] (which disclaims liability or warranty for this information).  If you have questions about a medical condition or this instruction, always ask your healthcare professional. [address] any warranty or liability for your use of this information. Please provide this summary of care documentation to your next provider. Your primary care clinician is listed as Pierre Antes. If you have any questions after today's visit, please call [phone].

## 2023-10-11 NOTE — ED NOTES
Discussed pt's blood pressure and HR with Dr. Elroy Urias. Received verbal orders to give 20 mg labetalol IV. Hourly rounding performed. Pt resting comfortably in bed, NAD, AxOx4. Pt denies need for toileting, call bell in reach. Siliq Counseling:  I discussed with the patient the risks of Siliq including but not limited to new or worsening depression, suicidal thoughts and behavior, immunosuppression, malignancy, posterior leukoencephalopathy syndrome, and serious infections.  The patient understands that monitoring is required including a PPD at baseline and must alert us or the primary physician if symptoms of infection or other concerning signs are noted. There is also a special program designed to monitor depression which is required with Siliq.

## (undated) DEVICE — FLEX ADVANTAGE 3000CC: Brand: FLEX ADVANTAGE

## (undated) DEVICE — FLUFF AND POLYMER UNDERPAD,EXTRA HEAVY: Brand: WINGS

## (undated) DEVICE — SYRINGE MED 25GA 3ML L5/8IN SUBQ PLAS W/ DETACH NDL SFTY

## (undated) DEVICE — MEDI-VAC NON-CONDUCTIVE SUCTION TUBING: Brand: CARDINAL HEALTH

## (undated) DEVICE — ENDOSCOPY PUMP TUBING/ CAP SET: Brand: ERBE

## (undated) DEVICE — CATHETER SUCT TR FL TIP 14FR W/ O CTRL

## (undated) DEVICE — AIRLIFE™ NASAL OXYGEN CANNULA CURVED, FLARED TIP WITH 14 FOOT (4.3 M) CRUSH-RESISTANT TUBING, OVER-THE-EAR STYLE: Brand: AIRLIFE™

## (undated) DEVICE — SYR 50ML SLIP TIP NSAF LF STRL --

## (undated) DEVICE — MEDI-VAC SUCTION HIGH CAPACITY: Brand: CARDINAL HEALTH

## (undated) DEVICE — STERILE POLYISOPRENE POWDER-FREE SURGICAL GLOVES: Brand: PROTEXIS

## (undated) DEVICE — SOLUTION IRRIG 1000ML H2O STRL BLT

## (undated) DEVICE — GAUZE SPONGES,16 PLY: Brand: CURITY

## (undated) DEVICE — BITE BLOCK ENDOSCP UNIV AD 6 TO 9.4 MM

## (undated) DEVICE — BASIN EMESIS 500CC ROSE 250/CS 60/PLT: Brand: MEDEGEN MEDICAL PRODUCTS, LLC